# Patient Record
Sex: FEMALE | Race: WHITE | NOT HISPANIC OR LATINO | Employment: FULL TIME | URBAN - METROPOLITAN AREA
[De-identification: names, ages, dates, MRNs, and addresses within clinical notes are randomized per-mention and may not be internally consistent; named-entity substitution may affect disease eponyms.]

---

## 2021-07-19 ENCOUNTER — OFFICE VISIT (OUTPATIENT)
Dept: FAMILY MEDICINE CLINIC | Facility: CLINIC | Age: 31
End: 2021-07-19
Payer: COMMERCIAL

## 2021-07-19 VITALS
BODY MASS INDEX: 29.59 KG/M2 | OXYGEN SATURATION: 99 % | HEIGHT: 63 IN | DIASTOLIC BLOOD PRESSURE: 96 MMHG | SYSTOLIC BLOOD PRESSURE: 140 MMHG | TEMPERATURE: 98.3 F | RESPIRATION RATE: 16 BRPM | HEART RATE: 91 BPM | WEIGHT: 167 LBS

## 2021-07-19 DIAGNOSIS — M25.50 POLYARTHRALGIA: ICD-10-CM

## 2021-07-19 DIAGNOSIS — R03.0 ELEVATED BP WITHOUT DIAGNOSIS OF HYPERTENSION: ICD-10-CM

## 2021-07-19 DIAGNOSIS — F32.0 CURRENT MILD EPISODE OF MAJOR DEPRESSIVE DISORDER WITHOUT PRIOR EPISODE (HCC): ICD-10-CM

## 2021-07-19 DIAGNOSIS — R53.82 CHRONIC FATIGUE: ICD-10-CM

## 2021-07-19 DIAGNOSIS — Z00.00 ANNUAL PHYSICAL EXAM: Primary | ICD-10-CM

## 2021-07-19 DIAGNOSIS — Z13.1 SCREENING FOR DIABETES MELLITUS: ICD-10-CM

## 2021-07-19 DIAGNOSIS — Z13.6 SCREENING FOR CARDIOVASCULAR CONDITION: ICD-10-CM

## 2021-07-19 DIAGNOSIS — Z12.4 SCREENING FOR CERVICAL CANCER: ICD-10-CM

## 2021-07-19 PROCEDURE — 3008F BODY MASS INDEX DOCD: CPT | Performed by: FAMILY MEDICINE

## 2021-07-19 PROCEDURE — 3725F SCREEN DEPRESSION PERFORMED: CPT | Performed by: FAMILY MEDICINE

## 2021-07-19 PROCEDURE — 1036F TOBACCO NON-USER: CPT | Performed by: FAMILY MEDICINE

## 2021-07-19 PROCEDURE — 99385 PREV VISIT NEW AGE 18-39: CPT | Performed by: FAMILY MEDICINE

## 2021-07-19 RX ORDER — NORGESTIMATE AND ETHINYL ESTRADIOL 7DAYSX3 LO
1 KIT ORAL DAILY
COMMUNITY
End: 2022-01-18

## 2021-07-19 NOTE — PROGRESS NOTES
[facility] PRACT[facility]    NAME: Tani Matos  AGE: 27 y o  SEX: female  : 1990     DATE: 2021     Assessment and Plan:     Problem List Items Addressed This Visit        Other    Chronic fatigue    Relevant Orders    Lipid panel    Comprehensive metabolic panel    CBC and differential    TSH, 3rd generation with Free T4 reflex    UA (URINE) with reflex to Scope    ANJEL Screen w/ Reflex to Titer/Pattern    Rheumatoid Factor    BMI 29 0-29 9,adult     BMI Counseling: Body mass index is 29 58 kg/m²  The BMI is above normal  Nutrition recommendations include reducing portion sizes, decreasing overall calorie intake and 3-5 servings of fruits/vegetables daily  Exercise recommendations include exercising 3-5 times per week  Relevant Orders    Lipid panel    Comprehensive metabolic panel    CBC and differential    TSH, 3rd generation with Free T4 reflex    UA (URINE) with reflex to Scope    ANJEL Screen w/ Reflex to Titer/Pattern    Rheumatoid Factor    Current mild episode of major depressive disorder without prior episode ([address] )     Depression Screening Follow-up Plan: Patient's depression screening was positive with a PHQ-2 score of 4  Their PHQ-9 score was 9  Patient assessed for underlying major depression  They have no active suicidal ideations  Brief counseling provided and recommend additional follow-up/re-evaluation next office visit           Relevant Orders    Lipid panel    Comprehensive metabolic panel    CBC and differential    TSH, 3rd generation with Free T4 reflex    UA (URINE) with reflex to Scope    ANJEL Screen w/ Reflex to Titer/Pattern    Rheumatoid Factor    Elevated BP without diagnosis of hypertension    Relevant Orders    Lipid panel    Comprehensive metabolic panel    CBC and differential    TSH, 3rd generation with Free T4 reflex    UA (URINE) with reflex to Scope    ANJEL Screen w/ Reflex to Titer/Pattern Rheumatoid Factor    Polyarthralgia    Relevant Orders    Lipid panel    Comprehensive metabolic panel    CBC and differential    TSH, 3rd generation with Free T4 reflex    UA (URINE) with reflex to Scope    ANJEL Screen w/ Reflex to Titer/Pattern    Rheumatoid Factor      Other Visit Diagnoses     Annual physical exam    -  Primary    Relevant Orders    Lipid panel    Comprehensive metabolic panel    CBC and differential    TSH, 3rd generation with Free T4 reflex    UA (URINE) with reflex to Scope    ANJEL Screen w/ Reflex to Titer/Pattern    Rheumatoid Factor    Screening for cervical cancer        Relevant Orders    Ambulatory referral to Obstetrics / Gynecology    Screening for diabetes mellitus        Relevant Orders    Lipid panel    Comprehensive metabolic panel    Screening for cardiovascular condition        Relevant Orders    Lipid panel    Comprehensive metabolic panel          Immunizations and preventive care screenings were discussed with patient today  Appropriate education was printed on patient's after visit summary  Counseling:  Alcohol/drug use: discussed moderation in alcohol intake, the recommendations for healthy alcohol use, and avoidance of illicit drug use  Dental Health: discussed importance of regular tooth brushing, flossing, and dental visits  Injury prevention: discussed safety/seat belts, safety helmets, smoke detectors, carbon dioxide detectors, and smoking near bedding or upholstery  Sexual health: discussed sexually transmitted diseases, partner selection, use of condoms, avoidance of unintended pregnancy, and contraceptive alternatives  · Exercise: the importance of regular exercise/physical activity was discussed  Recommend exercise 3-5 times per week for at least 30 minutes  Patient reports she received a covid vaccine earlier this year and is UTD on Tdap  Return in about 3 weeks (around 8/9/2021) for depression follow up/lab review       Chief Complaint:     Chief Complaint   Patient presents with   [facility] patient Annual Physical 27year old      History of Present Illness:     Adult Annual Physical   Patient here for a comprehensive physical exam  She is a new patient  Works as an LPN  Denies any PMH  PSH: tonsils, wisdom teeth     Diet and Physical Activity  · Diet/Nutrition: well balanced diet  · Exercise: moderate cardiovascular exercise  Depression Screening  PHQ-9 Depression Screening    PHQ-9:   Frequency of the following problems over the past two weeks:      Little interest or pleasure in doing things: 2 - more than half the days  Feeling down, depressed, or hopeless: 2 - more than half the days  Trouble falling or staying asleep, or sleeping too much: 2 - more than half the days  Feeling tired or having little energy: 2 - more than half the days  Poor appetite or overeatin - not at all  Feeling bad about yourself - or that you are a failure or have let yourself or your family down: 1 - several days  Trouble concentrating on things, such as reading the newspaper or watching television: 0 - not at all  Moving or speaking so slowly that other people could have noticed  Or the opposite - being so fidgety or restless that you have been moving around a lot more than usual: 0 - not at all  Thoughts that you would be better off dead, or of hurting yourself in some way: 0 - not at all  PHQ-2 Score: 4  PHQ-9 Score: 9         /GYN Health  ·   · Contraceptive method: oral contraceptives  ·      Review of Systems:     Review of Systems   Constitutional: Positive for fatigue  Negative for chills and fever  HENT: Negative for congestion, postnasal drip, rhinorrhea and sinus pressure  Eyes: Negative for photophobia and visual disturbance  Respiratory: Negative for cough and shortness of breath  Cardiovascular: Negative for chest pain, palpitations and leg swelling     Gastrointestinal: Negative for abdominal pain, constipation, diarrhea, nausea and vomiting  Genitourinary: Negative for difficulty urinating and dysuria  Musculoskeletal: Negative for arthralgias and myalgias  Skin: Negative for color change and rash  Neurological: Negative for dizziness, weakness, light-headedness and headaches  Past Medical History:     History reviewed  No pertinent past medical history  Past Surgical History:     History reviewed  No pertinent surgical history  Social History:     Social History     Socioeconomic History    Marital status: /Civil Union     Spouse name: None    Number of children: None    Years of education: None    Highest education level: None   Occupational History    None   Tobacco Use    Smoking status: Former Smoker     Packs/day: 0 50     Years: 10 00     Pack years: 5 00     Types: Cigarettes     Quit date: 2019     Years since quittin 2    Smokeless tobacco: Never Used   Vaping Use    Vaping Use: Never used   Substance and Sexual Activity    Alcohol use: Yes     Alcohol/week: 2 0 standard drinks     Types: 2 Standard drinks or equivalent per week    Drug use: None    Sexual activity: None   Other Topics Concern    None   Social History Narrative    None     Social Determinants of Health     Financial Resource Strain:     Difficulty of Paying Living Expenses:    Food Insecurity:     Worried About Running Out of Food in the Last Year:     Ran Out of Food in the Last Year:    Transportation Needs:     Lack of Transportation (Medical):      Lack of Transportation (Non-Medical):    Physical Activity: Inactive    Days of Exercise per Week: 0 days    Minutes of Exercise per Session: 0 min   Stress: Stress Concern Present    Feeling of Stress : Very much   Social Connections:     Frequency of Communication with Friends and Family:     Frequency of Social Gatherings with Friends and Family:     Attends [employer] Services:     Active Member of Clubs or Organizations:     Attends Club or Organization Meetings:     Marital Status:    Intimate Partner Violence: Not At Risk    Fear of Current or Ex-Partner: No    Emotionally Abused: No    Physically Abused: No    Sexually Abused: No      Family History:     History reviewed  No pertinent family history  Current Medications:     Current Outpatient Medications   Medication Sig Dispense Refill    norgestimate-ethinyl estradiol (ORTHO TRI-CYCLEN LO) 0 18/0 215/0 25 MG-25 MCG per tablet Take 1 tablet by mouth daily       No current facility-administered medications for this visit  Allergies:     No Known Allergies   Physical Exam:     /96 (BP Location: Right arm, Patient Position: Sitting, Cuff Size: Standard)   Pulse 91   Temp 98 3 °F (36 8 °C) (Tympanic)   Resp 16   Ht 5' 3" (1 6 m)   Wt 75 8 kg (167 lb)   LMP 07/05/2021   SpO2 99%   BMI 29 58 kg/m²     Physical Exam  Constitutional:       General: She is not in acute distress  Appearance: Normal appearance  She is not ill-appearing, toxic-appearing or diaphoretic  HENT:      Head: Normocephalic and atraumatic  Right Ear: Tympanic membrane and ear canal normal       Left Ear: Tympanic membrane and ear canal normal       Nose: Nose normal  No congestion  Mouth/Throat:      Mouth: Mucous membranes are moist       Pharynx: Oropharynx is clear  No oropharyngeal exudate  Eyes:      Extraocular Movements: Extraocular movements intact  Conjunctiva/sclera: Conjunctivae normal       Pupils: Pupils are equal, round, and reactive to light  Cardiovascular:      Rate and Rhythm: Normal rate and regular rhythm  Pulses: Normal pulses  Heart sounds: No murmur heard  Pulmonary:      Effort: Pulmonary effort is normal       Breath sounds: Normal breath sounds  No wheezing, rhonchi or rales  Abdominal:      General: Bowel sounds are normal  There is no distension  Palpations: Abdomen is soft  Tenderness: There is no abdominal tenderness  Musculoskeletal:         General: No swelling or tenderness  Normal range of motion  Cervical back: Normal range of motion and neck supple  Skin:     General: Skin is warm and dry  Capillary Refill: Capillary refill takes less than 2 seconds  Neurological:      General: No focal deficit present  Mental Status: She is alert and oriented to person, place, and time  Cranial Nerves: No cranial nerve deficit  Psychiatric:         Mood and Affect: Mood normal          Behavior: Behavior normal          Thought Content:  Thought content normal           Eleuterio Long DO   [address]

## 2021-07-19 NOTE — ASSESSMENT & PLAN NOTE
Depression Screening Follow-up Plan: Patient's depression screening was positive with a PHQ-2 score of 4  Their PHQ-9 score was 9  Patient assessed for underlying major depression  They have no active suicidal ideations  Brief counseling provided and recommend additional follow-up/re-evaluation next office visit

## 2021-07-19 NOTE — ASSESSMENT & PLAN NOTE
BMI Counseling: Body mass index is 29 58 kg/m²  The BMI is above normal  Nutrition recommendations include reducing portion sizes, decreasing overall calorie intake and 3-5 servings of fruits/vegetables daily  Exercise recommendations include exercising 3-5 times per week

## 2021-07-19 NOTE — PATIENT INSTRUCTIONS

## 2021-07-22 LAB
ALBUMIN SERPL-MCNC: 4.1 G/DL (ref 3.6–5.1)
ALBUMIN/GLOB SERPL: 1.4 (CALC) (ref 1–2.5)
ALP SERPL-CCNC: 67 U/L (ref 31–125)
ALT SERPL-CCNC: 12 U/L (ref 6–29)
ANA PAT SER IF-IMP: ABNORMAL
ANA SER QL IF: POSITIVE
ANA TITR SER IF: ABNORMAL TITER
APPEARANCE UR: ABNORMAL
AST SERPL-CCNC: 13 U/L (ref 10–30)
BACTERIA UR QL AUTO: ABNORMAL /HPF
BASOPHILS # BLD AUTO: 28 CELLS/UL (ref 0–200)
BASOPHILS NFR BLD AUTO: 0.3 %
BILIRUB SERPL-MCNC: 0.3 MG/DL (ref 0.2–1.2)
BILIRUB UR QL STRIP: NEGATIVE
BUN SERPL-MCNC: 8 MG/DL (ref 7–25)
BUN/CREAT SERPL: ABNORMAL (CALC) (ref 6–22)
CALCIUM SERPL-MCNC: 9.3 MG/DL (ref 8.6–10.2)
CHLORIDE SERPL-SCNC: 105 MMOL/L (ref 98–110)
CHOLEST SERPL-MCNC: 140 MG/DL
CHOLEST/HDLC SERPL: 2.2 (CALC)
CO2 SERPL-SCNC: 28 MMOL/L (ref 20–32)
COLOR UR: YELLOW
CREAT SERPL-MCNC: 0.67 MG/DL (ref 0.5–1.1)
EOSINOPHIL # BLD AUTO: 205 CELLS/UL (ref 15–500)
EOSINOPHIL NFR BLD AUTO: 2.2 %
ERYTHROCYTE [DISTWIDTH] IN BLOOD BY AUTOMATED COUNT: 11.9 % (ref 11–15)
GLOBULIN SER CALC-MCNC: 2.9 G/DL (CALC) (ref 1.9–3.7)
GLUCOSE SERPL-MCNC: 130 MG/DL (ref 65–99)
GLUCOSE UR QL STRIP: NEGATIVE
HCT VFR BLD AUTO: 40.3 % (ref 35–45)
HDLC SERPL-MCNC: 63 MG/DL
HGB BLD-MCNC: 13.5 G/DL (ref 11.7–15.5)
HGB UR QL STRIP: NEGATIVE
KETONES UR QL STRIP: NEGATIVE
LDLC SERPL CALC-MCNC: 62 MG/DL (CALC)
LEUKOCYTE ESTERASE UR QL STRIP: ABNORMAL
LYMPHOCYTES # BLD AUTO: 3608 CELLS/UL (ref 850–3900)
LYMPHOCYTES NFR BLD AUTO: 38.8 %
MCH RBC QN AUTO: 34.1 PG (ref 27–33)
MCHC RBC AUTO-ENTMCNC: 33.5 G/DL (ref 32–36)
MCV RBC AUTO: 101.8 FL (ref 80–100)
MONOCYTES # BLD AUTO: 772 CELLS/UL (ref 200–950)
MONOCYTES NFR BLD AUTO: 8.3 %
NEUTROPHILS # BLD AUTO: 4687 CELLS/UL (ref 1500–7800)
NEUTROPHILS NFR BLD AUTO: 50.4 %
NITRITE UR QL STRIP: NEGATIVE
NONHDLC SERPL-MCNC: 77 MG/DL (CALC)
PH UR STRIP: 5.5 [PH] (ref 5–8)
PLATELET # BLD AUTO: 250 THOUSAND/UL (ref 140–400)
PMV BLD REES-ECKER: 11.7 FL (ref 7.5–12.5)
POTASSIUM SERPL-SCNC: 4.2 MMOL/L (ref 3.5–5.3)
PROT SERPL-MCNC: 7 G/DL (ref 6.1–8.1)
PROT UR QL STRIP: NEGATIVE
RBC # BLD AUTO: 3.96 MILLION/UL (ref 3.8–5.1)
RBC #/AREA URNS HPF: ABNORMAL /HPF
RHEUMATOID FACT SERPL-ACNC: <14 IU/ML
SL AMB EGFR AFRICAN AMERICAN: 135 ML/MIN/1.73M2
SL AMB EGFR NON AFRICAN AMERICAN: 116 ML/MIN/1.73M2
SODIUM SERPL-SCNC: 138 MMOL/L (ref 135–146)
SP GR UR STRIP: 1.02 (ref 1–1.03)
SQUAMOUS #/AREA URNS HPF: ABNORMAL /HPF
TRIGL SERPL-MCNC: 69 MG/DL
TSH SERPL-ACNC: 2.71 MIU/L
WBC # BLD AUTO: 9.3 THOUSAND/UL (ref 3.8–10.8)
WBC #/AREA URNS HPF: ABNORMAL /HPF

## 2021-08-09 ENCOUNTER — RA CDI HCC (OUTPATIENT)
Dept: OTHER | Facility: HOSPITAL | Age: 31
End: 2021-08-09

## 2021-08-09 NOTE — PROGRESS NOTES
[facility]  coding opportunities          Chart reviewed, no opportunity found: CHART REVIEWED, NO OPPORTUNITY FOUND                     Patients insurance company: [facility] [address]  (Medicare Advantage and Commercial)

## 2021-08-13 ENCOUNTER — OFFICE VISIT (OUTPATIENT)
Dept: FAMILY MEDICINE CLINIC | Facility: CLINIC | Age: 31
End: 2021-08-13
Payer: COMMERCIAL

## 2021-08-13 VITALS
TEMPERATURE: 97.6 F | BODY MASS INDEX: 29.23 KG/M2 | WEIGHT: 165 LBS | SYSTOLIC BLOOD PRESSURE: 120 MMHG | DIASTOLIC BLOOD PRESSURE: 82 MMHG | OXYGEN SATURATION: 99 % | RESPIRATION RATE: 20 BRPM | HEIGHT: 63 IN | HEART RATE: 88 BPM

## 2021-08-13 DIAGNOSIS — R76.8 POSITIVE ANA (ANTINUCLEAR ANTIBODY): ICD-10-CM

## 2021-08-13 DIAGNOSIS — M25.50 POLYARTHRALGIA: ICD-10-CM

## 2021-08-13 DIAGNOSIS — R73.01 IFG (IMPAIRED FASTING GLUCOSE): Primary | ICD-10-CM

## 2021-08-13 DIAGNOSIS — R53.82 CHRONIC FATIGUE: ICD-10-CM

## 2021-08-13 DIAGNOSIS — F32.0 CURRENT MILD EPISODE OF MAJOR DEPRESSIVE DISORDER WITHOUT PRIOR EPISODE (HCC): ICD-10-CM

## 2021-08-13 LAB — SL AMB POCT HEMOGLOBIN AIC: 6.3 (ref ?–6.5)

## 2021-08-13 PROCEDURE — 3725F SCREEN DEPRESSION PERFORMED: CPT | Performed by: FAMILY MEDICINE

## 2021-08-13 PROCEDURE — 99214 OFFICE O/P EST MOD 30 MIN: CPT | Performed by: FAMILY MEDICINE

## 2021-08-13 PROCEDURE — 3008F BODY MASS INDEX DOCD: CPT | Performed by: FAMILY MEDICINE

## 2021-08-13 PROCEDURE — 1036F TOBACCO NON-USER: CPT | Performed by: FAMILY MEDICINE

## 2021-08-13 PROCEDURE — 83036 HEMOGLOBIN GLYCOSYLATED A1C: CPT | Performed by: FAMILY MEDICINE

## 2021-08-13 RX ORDER — SERTRALINE HYDROCHLORIDE 25 MG/1
25 TABLET, FILM COATED ORAL DAILY
Qty: 30 TABLET | Refills: 5 | Status: SHIPPED | OUTPATIENT
Start: 2021-08-13 | End: 2021-09-09 | Stop reason: SDUPTHER

## 2021-08-13 NOTE — ASSESSMENT & PLAN NOTE
Lab Results   Component Value Date    HGBA1C 6 3 08/13/2021       Will monitor with lifestyle modifications and recheck HA1C in 3 months time

## 2021-08-13 NOTE — PROGRESS NOTES
Carlos Sneed 1990 female MRN: [id number]    Family Medicine Follow-up Visit    ASSESSMENT/PLAN  Problem List Items Addressed This Visit        Endocrine    IFG (impaired fasting glucose) - Primary     Lab Results   Component Value Date    HGBA1C 6 3 08/13/2021       Will monitor with lifestyle modifications and recheck HA1C in 3 months time          Relevant Orders    POCT hemoglobin A1c (Completed)       Other    Chronic fatigue    Relevant Orders    Ambulatory referral to Rheumatology    Current mild episode of major depressive disorder without prior episode ([address] )     We will start medication at this time  Close follow up in 3 weeks for medication check  Will also schedule apt with Lorna Schultz for [facility] support          Relevant Medications    sertraline (ZOLOFT) 25 mg tablet    Polyarthralgia    Relevant Orders    Ambulatory referral to Rheumatology    Positive ANJEL (antinuclear antibody)    Relevant Orders    Ambulatory referral to Rheumatology                   Future Appointments   Date Time Provider Lorenzo Cunha   9/9/2021  9:15 AM DO [facility]          SUBJECTIVE  CC: Follow-up (Review blood work  depression)      HPI:  Carlos Sneed is a 27 y o  female who presents for check up depression/ lab work review  HPI    Review of Systems   Constitutional: Negative for chills, fatigue and fever  HENT: Negative for congestion, postnasal drip, rhinorrhea and sinus pressure  Eyes: Negative for photophobia and visual disturbance  Respiratory: Negative for cough and shortness of breath  Cardiovascular: Negative for chest pain, palpitations and leg swelling  Gastrointestinal: Negative for abdominal pain, constipation, diarrhea, nausea and vomiting  Genitourinary: Negative for difficulty urinating and dysuria  Musculoskeletal: Negative for arthralgias and myalgias  Skin: Negative for color change and rash     Neurological: Negative for dizziness, weakness, light-headedness and headaches  Historical Information   The patient history was reviewed as follows:    History reviewed  No pertinent past medical history  History reviewed  No pertinent surgical history  History reviewed  No pertinent family history  Social History   Social History     Substance and Sexual Activity   Alcohol Use Yes    Alcohol/week: 2 0 standard drinks    Types: 2 Standard drinks or equivalent per week     Social History     Substance and Sexual Activity   Drug Use Not on file     Social History     Tobacco Use   Smoking Status Former Smoker    Packs/day: 0 50    Years: 10 00    Pack years: 5 00    Types: Cigarettes    Quit date: 2019    Years since quittin 2   Smokeless Tobacco Never Used       Medications:     Current Outpatient Medications:     norgestimate-ethinyl estradiol (ORTHO TRI-CYCLEN LO) 0 18/0 215/0 25 MG-25 MCG per tablet, Take 1 tablet by mouth daily (Patient not taking: Reported on 2021), Disp: , Rfl:     sertraline (ZOLOFT) 25 mg tablet, Take 1 tablet (25 mg total) by mouth daily, Disp: 30 tablet, Rfl: 5  No Known Allergies    OBJECTIVE    Vitals:   Vitals:    21 1038   BP: 120/82   BP Location: Left arm   Patient Position: Sitting   Cuff Size: Standard   Pulse: 88   Resp: 20   Temp: 97 6 °F (36 4 °C)   TempSrc: Axillary   SpO2: 99%   Weight: 74 8 kg (165 lb)   Height: 5' 3" (1 6 m)           Physical Exam  Constitutional:       General: She is not in acute distress  Appearance: She is not ill-appearing, toxic-appearing or diaphoretic  HENT:      Head: Normocephalic and atraumatic  Nose: Nose normal    Eyes:      Extraocular Movements: Extraocular movements intact  Conjunctiva/sclera: Conjunctivae normal    Pulmonary:      Effort: Pulmonary effort is normal  No respiratory distress  Musculoskeletal:         General: Normal range of motion  Cervical back: Normal range of motion     Neurological:      General: No focal deficit present  Mental Status: She is alert and oriented to person, place, and time     Psychiatric:         Mood and Affect: Mood normal          Behavior: Behavior normal             Labs:        DO Filemon    8/13/2021

## 2021-08-13 NOTE — ASSESSMENT & PLAN NOTE
We will start medication at this time  Close follow up in 3 weeks for medication check  Will also schedule apt with Juliette Llanes for [address] [place] [address]

## 2021-09-09 ENCOUNTER — TELEMEDICINE (OUTPATIENT)
Dept: FAMILY MEDICINE CLINIC | Facility: CLINIC | Age: 31
End: 2021-09-09
Payer: COMMERCIAL

## 2021-09-09 VITALS — WEIGHT: 162 LBS | HEIGHT: 63 IN | BODY MASS INDEX: 28.7 KG/M2

## 2021-09-09 DIAGNOSIS — F32.0 CURRENT MILD EPISODE OF MAJOR DEPRESSIVE DISORDER WITHOUT PRIOR EPISODE (HCC): Primary | ICD-10-CM

## 2021-09-09 PROCEDURE — 3725F SCREEN DEPRESSION PERFORMED: CPT | Performed by: FAMILY MEDICINE

## 2021-09-09 PROCEDURE — 3008F BODY MASS INDEX DOCD: CPT | Performed by: FAMILY MEDICINE

## 2021-09-09 PROCEDURE — 99214 OFFICE O/P EST MOD 30 MIN: CPT | Performed by: FAMILY MEDICINE

## 2021-09-09 PROCEDURE — 1036F TOBACCO NON-USER: CPT | Performed by: FAMILY MEDICINE

## 2021-09-09 NOTE — PROGRESS NOTES
Virtual Regular Visit    Verification of patient location:    Patient is located in the following state in which I hold an active license [place]      Assessment/Plan:    Problem List Items Addressed This Visit        Other    Current mild episode of major depressive disorder without prior episode ([facility] [address] ) - Primary    Relevant Medications    sertraline (ZOLOFT) 50 mg tablet        Silas Meigs is noting some improvement since starting zoloft  We will increase dose to 50 mg daily and continue to monitor and dose adjust as needed        Reason for visit is   Chief Complaint   Patient presents with    Follow-up     Follow up depression    Virtual Regular Visit        Encounter provider Cinda Tompkins DO    Provider located at [address] [place] [address]    [address] , [address] [id number] [phone]      Recent Visits  No visits were found meeting these conditions  Showing recent visits within past 7 days and meeting all other requirements  Today's Visits  Date Type Provider Dept   09/09/21 Telemedicine Cinda Tompkins DO [facility]   Showing today's visits and meeting all other requirements  Future Appointments  No visits were found meeting these conditions  Showing future appointments within next 150 days and meeting all other requirements       The patient was identified by name and date of birth  Jerri Goldstein was informed that this is a telemedicine visit and that the visit is being conducted through [address] [place] [address] Now and patient was informed that this is a secure, HIPAA-compliant platform  She agrees to proceed     My office door was closed  No one else was in the room  She acknowledged consent and understanding of privacy and security of the video platform  The patient has agreed to participate and understands they can discontinue the visit at any time  Patient is aware this is a billable service       Subjective  Jerri Goldstein is a 27 y o  female being seen for follow up of depression  She has been taking zoloft daily in the morning and reports slight improvement in her symptoms  No negative side effects  HPI     No past medical history on file  No past surgical history on file  Current Outpatient Medications   Medication Sig Dispense Refill    sertraline (ZOLOFT) 50 mg tablet Take 1 tablet (50 mg total) by mouth daily 30 tablet 1    norgestimate-ethinyl estradiol (ORTHO TRI-CYCLEN LO) 0 18/0 215/0 25 MG-25 MCG per tablet Take 1 tablet by mouth daily (Patient not taking: Reported on 8/13/2021)       No current facility-administered medications for this visit  No Known Allergies    Review of Systems   Constitutional: Negative for chills, fatigue and fever  HENT: Negative for congestion, postnasal drip, rhinorrhea and sinus pressure  Eyes: Negative for photophobia and visual disturbance  Respiratory: Negative for cough and shortness of breath  Cardiovascular: Negative for chest pain, palpitations and leg swelling  Gastrointestinal: Negative for abdominal pain, constipation, diarrhea, nausea and vomiting  Genitourinary: Negative for difficulty urinating and dysuria  Musculoskeletal: Negative for arthralgias and myalgias  Skin: Negative for color change and rash  Neurological: Negative for dizziness, weakness, light-headedness and headaches  Video Exam    Vitals:    09/09/21 0902   Weight: 73 5 kg (162 lb)   Height: 5' 3" (1 6 m)       Physical Exam  Constitutional:       General: She is not in acute distress  Appearance: She is not ill-appearing, toxic-appearing or diaphoretic  HENT:      Head: Normocephalic and atraumatic  Nose: Nose normal    Eyes:      Extraocular Movements: Extraocular movements intact  Conjunctiva/sclera: Conjunctivae normal    Pulmonary:      Effort: Pulmonary effort is normal  No respiratory distress  Musculoskeletal:         General: Normal range of motion        Cervical back: Normal range of motion  Neurological:      Mental Status: She is oriented to person, place, and time  Psychiatric:         Mood and Affect: Mood normal          Behavior: Behavior normal           I spent 15 minutes directly with the patient during this visit    VIRTUAL VISIT DISCLAIMER      Angelmegan Jarad verbally agrees to participate in [employer]  Pt is aware that [employer] could be limited without vital signs or the ability to perform a full hands-on physical exam  Naya Gan understands she or the provider may request at any time to terminate the video visit and request the patient to seek care or treatment in person

## 2021-10-22 ENCOUNTER — SOCIAL WORK (OUTPATIENT)
Dept: BEHAVIORAL/MENTAL HEALTH CLINIC | Facility: CLINIC | Age: 31
End: 2021-10-22
Payer: COMMERCIAL

## 2021-10-22 DIAGNOSIS — F33.1 MODERATE RECURRENT MAJOR DEPRESSION (HCC): Primary | ICD-10-CM

## 2021-10-22 DIAGNOSIS — F41.1 GAD (GENERALIZED ANXIETY DISORDER): ICD-10-CM

## 2021-10-22 PROCEDURE — 90834 PSYTX W PT 45 MINUTES: CPT | Performed by: SOCIAL WORKER

## 2021-11-14 DIAGNOSIS — F32.0 CURRENT MILD EPISODE OF MAJOR DEPRESSIVE DISORDER WITHOUT PRIOR EPISODE (HCC): ICD-10-CM

## 2021-12-03 ENCOUNTER — HOSPITAL ENCOUNTER (OUTPATIENT)
Dept: RADIOLOGY | Facility: HOSPITAL | Age: 31
Discharge: HOME | End: 2021-12-03
Payer: COMMERCIAL

## 2021-12-03 ENCOUNTER — CONSULT (OUTPATIENT)
Dept: RHEUMATOLOGY | Facility: CLINIC | Age: 31
End: 2021-12-03
Payer: COMMERCIAL

## 2021-12-03 VITALS
DIASTOLIC BLOOD PRESSURE: 82 MMHG | HEIGHT: 63 IN | BODY MASS INDEX: 28.7 KG/M2 | WEIGHT: 162 LBS | SYSTOLIC BLOOD PRESSURE: 134 MMHG

## 2021-12-03 DIAGNOSIS — R53.82 CHRONIC FATIGUE: ICD-10-CM

## 2021-12-03 DIAGNOSIS — M54.50 CHRONIC BILATERAL LOW BACK PAIN WITHOUT SCIATICA: ICD-10-CM

## 2021-12-03 DIAGNOSIS — R76.8 POSITIVE ANA (ANTINUCLEAR ANTIBODY): ICD-10-CM

## 2021-12-03 DIAGNOSIS — R76.8 ANA POSITIVE: Primary | ICD-10-CM

## 2021-12-03 DIAGNOSIS — M25.50 POLYARTHRALGIA: ICD-10-CM

## 2021-12-03 DIAGNOSIS — G89.29 CHRONIC BILATERAL LOW BACK PAIN WITHOUT SCIATICA: ICD-10-CM

## 2021-12-03 PROCEDURE — 72200 X-RAY EXAM SI JOINTS: CPT

## 2021-12-03 PROCEDURE — 72110 X-RAY EXAM L-2 SPINE 4/>VWS: CPT

## 2021-12-03 PROCEDURE — 99244 OFF/OP CNSLTJ NEW/EST MOD 40: CPT | Performed by: INTERNAL MEDICINE

## 2021-12-13 LAB
25(OH)D3+25(OH)D2 SERPL-MCNC: 34.8 NG/ML (ref 30–100)
ANA SER QL: NEGATIVE
BASOPHILS # BLD AUTO: 0 X10E3/UL (ref 0–0.2)
BASOPHILS NFR BLD AUTO: 0 %
C3 SERPL-MCNC: 135 MG/DL (ref 82–167)
C4 SERPL-MCNC: 29 MG/DL (ref 12–38)
CCP IGA+IGG SERPL IA-ACNC: 5 UNITS (ref 0–19)
CENTROMERE AB TITR SER IF: NORMAL {TITER}
CK SERPL-CCNC: 55 U/L (ref 32–182)
CRP SERPL-MCNC: <1 MG/L (ref 0–10)
DSDNA AB SER-ACNC: <1 IU/ML (ref 0–9)
ENA JO1 AB SER-ACNC: <0.2 AI (ref 0–0.9)
ENA RNP AB SER-ACNC: 0.2 AI (ref 0–0.9)
ENA SCL70 AB SER-ACNC: <0.2 AI (ref 0–0.9)
ENA SM AB SER-ACNC: <0.2 AI (ref 0–0.9)
ENA SM AB SER-ACNC: <20 UNITS
ENA SS-A AB SER-ACNC: <0.2 AI (ref 0–0.9)
ENA SS-B AB SER-ACNC: <0.2 AI (ref 0–0.9)
EOSINOPHIL # BLD AUTO: 0.2 X10E3/UL (ref 0–0.4)
EOSINOPHIL NFR BLD AUTO: 2 %
ERYTHROCYTE [DISTWIDTH] IN BLOOD BY AUTOMATED COUNT: 12 % (ref 11.7–15.4)
ERYTHROCYTE [SEDIMENTATION RATE] IN BLOOD BY WESTERGREN METHOD: 5 MM/HR (ref 0–32)
HBV CORE AB SERPL QL IA: NEGATIVE
HBV SURFACE AB SER QL: REACTIVE
HBV SURFACE AG SERPL QL IA: NEGATIVE
HCT VFR BLD AUTO: 39.2 % (ref 34–46.6)
HCV AB S/CO SERPL IA: <0.1 S/CO RATIO (ref 0–0.9)
HGB BLD-MCNC: 13.5 G/DL (ref 11.1–15.9)
HLA-B27 QL NAA+PROBE: NEGATIVE
IMM GRANULOCYTES # BLD: 0 X10E3/UL (ref 0–0.1)
IMM GRANULOCYTES NFR BLD: 0 %
LYMPHOCYTES # BLD AUTO: 3 X10E3/UL (ref 0.7–3.1)
LYMPHOCYTES NFR BLD AUTO: 36 %
MCH RBC QN AUTO: 34.8 PG (ref 26.6–33)
MCHC RBC AUTO-ENTMCNC: 34.4 G/DL (ref 31.5–35.7)
MCV RBC AUTO: 101 FL (ref 79–97)
MONOCYTES # BLD AUTO: 0.6 X10E3/UL (ref 0.1–0.9)
MONOCYTES NFR BLD AUTO: 8 %
NEUTROPHILS # BLD AUTO: 4.6 X10E3/UL (ref 1.4–7)
NEUTROPHILS NFR BLD AUTO: 54 %
PLATELET # BLD AUTO: 231 X10E3/UL (ref 150–450)
RBC # BLD AUTO: 3.88 X10E6/UL (ref 3.77–5.28)
SL AMB INTERPRETATION: NORMAL
THYROGLOB AB SERPL-ACNC: <1 IU/ML (ref 0–0.9)
THYROPEROXIDASE AB SERPL-ACNC: <9 IU/ML
WBC # BLD AUTO: 8.5 X10E3/UL (ref 3.4–10.8)

## 2021-12-28 ENCOUNTER — OFFICE VISIT (OUTPATIENT)
Dept: FAMILY MEDICINE CLINIC | Facility: CLINIC | Age: 31
End: 2021-12-28
Payer: COMMERCIAL

## 2021-12-28 VITALS
BODY MASS INDEX: 31.29 KG/M2 | RESPIRATION RATE: 20 BRPM | SYSTOLIC BLOOD PRESSURE: 130 MMHG | OXYGEN SATURATION: 97 % | HEIGHT: 63 IN | HEART RATE: 91 BPM | WEIGHT: 176.6 LBS | DIASTOLIC BLOOD PRESSURE: 84 MMHG | TEMPERATURE: 97.2 F

## 2021-12-28 DIAGNOSIS — R21 RASH AND NONSPECIFIC SKIN ERUPTION: Primary | ICD-10-CM

## 2021-12-28 PROCEDURE — 99214 OFFICE O/P EST MOD 30 MIN: CPT | Performed by: FAMILY MEDICINE

## 2021-12-28 PROCEDURE — 3725F SCREEN DEPRESSION PERFORMED: CPT | Performed by: FAMILY MEDICINE

## 2021-12-28 PROCEDURE — 3008F BODY MASS INDEX DOCD: CPT | Performed by: FAMILY MEDICINE

## 2021-12-28 PROCEDURE — 1036F TOBACCO NON-USER: CPT | Performed by: FAMILY MEDICINE

## 2021-12-28 RX ORDER — PREDNISONE 10 MG/1
TABLET ORAL
Qty: 21 TABLET | Refills: 0 | Status: SHIPPED | OUTPATIENT
Start: 2021-12-28 | End: 2022-01-18

## 2022-01-11 DIAGNOSIS — F32.0 CURRENT MILD EPISODE OF MAJOR DEPRESSIVE DISORDER WITHOUT PRIOR EPISODE (HCC): ICD-10-CM

## 2022-01-18 ENCOUNTER — OFFICE VISIT (OUTPATIENT)
Dept: OBGYN CLINIC | Facility: CLINIC | Age: 32
End: 2022-01-18
Payer: COMMERCIAL

## 2022-01-18 VITALS
HEIGHT: 63 IN | SYSTOLIC BLOOD PRESSURE: 114 MMHG | BODY MASS INDEX: 31.54 KG/M2 | WEIGHT: 178 LBS | DIASTOLIC BLOOD PRESSURE: 64 MMHG

## 2022-01-18 DIAGNOSIS — Z01.419 ENCOUNTER FOR GYNECOLOGICAL EXAMINATION WITHOUT ABNORMAL FINDING: Primary | ICD-10-CM

## 2022-01-18 DIAGNOSIS — Z31.69 ENCOUNTER FOR PRECONCEPTION CONSULTATION: ICD-10-CM

## 2022-01-18 DIAGNOSIS — Z30.09 CONTRACEPTIVE EDUCATION: ICD-10-CM

## 2022-01-18 DIAGNOSIS — Z12.4 CERVICAL CANCER SCREENING: ICD-10-CM

## 2022-01-18 PROCEDURE — 99385 PREV VISIT NEW AGE 18-39: CPT | Performed by: OBSTETRICS & GYNECOLOGY

## 2022-01-18 PROCEDURE — 3008F BODY MASS INDEX DOCD: CPT | Performed by: OBSTETRICS & GYNECOLOGY

## 2022-01-18 PROCEDURE — 1036F TOBACCO NON-USER: CPT | Performed by: OBSTETRICS & GYNECOLOGY

## 2022-01-18 NOTE — PROGRESS NOTES
[address], [address] [address]    ASSESSMENT/PLAN: Nelia Lombard is a 27 y o  No obstetric history on file  who presents for annual gynecologic exam     Encounter for routine gynecologic examination  - Routine well woman exam completed today  - HPV Vaccination status: not  Sure  ,  COVID  Vaccine  Pfizer   X 2   Flu shot  +  Counseled on HPV vaccine till age  38    - genetic disorders in either  Family pt  Declines  + zoloft never hospitalized for depression  No counseling  Denies  Feelings of hurting self or others   - Contraceptive counseling discussed  Current contraception: none: ;5/2020 stopped  OCP prior use of  Depo provera      Additional problems addressed during this visit:  1  Encounter for gynecological examination without abnormal finding    2  Contraceptive education  Comments: On contraception for  birth control prior regular cycles     3  BMI 31 0-31 9,adult  Comments:  Reviewed  Healthy bmi  and  exercise     4  Encounter for preconception consultation  Comments:  Denies  genetic disorders ,  MVI , dw pt diet and exercise  To get rubella screen  Orders:  -     Rubella antibody, IgG; Future  -     Rubella antibody, IgG    5  Cervical cancer screening  -     Thinprep Tis Pap and HPV mRNA E6/E7 Reflex HPV 16,18/45    28 yo [employer] here for wellness exam   Cycles  27-32 d for  4-5 d  No family planning  Desires fertility but not actively trying   + has an marjorie for  Fertility  Dw pt   [place] every other day during that time  Denies Genetic disorders  Will get Rubella screen   If not pregnant  With in   12 mo of actively trying and regular menses to contact the office  Former smoker     CC:  Annual Gynecologic Examination    HPI: Nelia Lombard is a 27 y o  No obstetric history on file  who presents for annual gynecologic examination    HPI    The following portions of the patient's history were reviewed and updated as appropriate: She has a past medical history of Chronic fatigue  She  has a past surgical history that includes TONSILECTOMY AND ADNOIDECTOMY and [place] tooth extraction (2019)  Her family history includes Cancer in her maternal grandfather  She  reports that she quit smoking about 2 years ago  Her smoking use included cigarettes  She has a 5 00 pack-year smoking history  She has never used smokeless tobacco  She reports current alcohol use of about 2 0 standard drinks of alcohol per week  She reports that she does not use drugs  Current Outpatient Medications   Medication Sig Dispense Refill    Prenatal MV-Min-Fe Fum-FA-DHA (PRENATAL 1 PO) Take 1 tablet by mouth daily      sertraline (ZOLOFT) 50 mg tablet Take 1 tablet (50 mg total) by mouth daily 30 tablet 0    norgestimate-ethinyl estradiol (ORTHO TRI-CYCLEN LO) 0 18/0 215/0 25 MG-25 MCG per tablet Take 1 tablet by mouth daily   (Patient not taking: Reported on 1/18/2022 )      predniSONE 10 mg tablet Take 60 mg x 1 day, then 50 mg x1 day, then 40 mg x1 day, then 30 mg x1 day, then 20 mg x 1 day, then  10 mg x1 day (Patient not taking: Reported on 1/18/2022 ) 21 tablet 0     No current facility-administered medications for this visit  She has No Known Allergies       Review of Systems   Constitutional: Negative for chills and fever  HENT: Negative for ear pain and sore throat  Eyes: Negative for pain and visual disturbance  Respiratory: Negative for cough and shortness of breath  Cardiovascular: Negative for chest pain and palpitations  Gastrointestinal: Negative for abdominal pain and vomiting  Genitourinary: Negative for dysuria and hematuria  Musculoskeletal: Negative for arthralgias and back pain  Skin: Negative for color change and rash  Neurological: Negative for seizures and syncope  Psychiatric/Behavioral: Negative  All other systems reviewed and are negative          Objective:  /64 (BP Location: Left arm, Patient Position: Sitting, Cuff Size: Standard)   Ht 5' 3" (1 6 m)   Wt 80 7 kg (178 lb)   BMI 31 53 kg/m²    Physical Exam  Vitals and nursing note reviewed  Constitutional:       Appearance: Normal appearance  HENT:      Head: Normocephalic  Cardiovascular:      Rate and Rhythm: Normal rate and regular rhythm  Pulses: Normal pulses  Heart sounds: Normal heart sounds  Pulmonary:      Effort: Pulmonary effort is normal       Breath sounds: Normal breath sounds  Chest:      Chest wall: No mass, lacerations, swelling, tenderness or edema  Breasts: Nicholas Score is 4  Breasts are symmetrical       Right: Normal  No swelling, bleeding, inverted nipple, mass, nipple discharge, skin change, tenderness, axillary adenopathy or supraclavicular adenopathy  Left: No swelling, bleeding, inverted nipple, mass, nipple discharge, skin change, tenderness, axillary adenopathy or supraclavicular adenopathy  Abdominal:      General: Abdomen is flat  Bowel sounds are normal       Palpations: Abdomen is soft  Genitourinary:     General: Normal vulva  Exam position: Lithotomy position  Pubic Area: No rash  Nicholas stage (genital): 4       Labia:         Right: No rash, tenderness or lesion  Left: No rash, tenderness or lesion  Urethra: No urethral pain, urethral swelling or urethral lesion  Vagina: Normal       Cervix: No cervical motion tenderness or discharge  Uterus: Normal        Adnexa: Right adnexa normal and left adnexa normal       Rectum: Normal    Musculoskeletal:         General: Normal range of motion  Cervical back: Neck supple  Lymphadenopathy:      Upper Body:      Right upper body: No supraclavicular, axillary or pectoral adenopathy  Left upper body: No supraclavicular, axillary or pectoral adenopathy  Lower Body: No right inguinal adenopathy  No left inguinal adenopathy  Skin:     General: Skin is warm and dry     Neurological:      General: No focal deficit present  Mental Status: She is alert and oriented to person, place, and time     Psychiatric:         Mood and Affect: Mood normal          Behavior: Behavior normal

## 2022-01-18 NOTE — PATIENT INSTRUCTIONS
Pap every 5 years if normal, sexually transmitted infection testing as indicated, exercise most days of week, obtain appropriate diet and hydration, Calcium 1000mg + 600 vit D daily, birth control as directed (ACHES reviewed)  Benefits, risks and alternatives discussed/reviewed  HPV 9 vaccine recommended through age 39  Check with your insurance for coverage  If covered, call office to schedule start of vaccine series  Annual mammogram starting at age 36, monthly breast self exam  Romana Punter 20 times twice daily

## 2022-01-20 LAB
CLINICAL INFO: ABNORMAL
CYTO CVX: ABNORMAL
CYTOLOGY CMNT CVX/VAG CYTO-IMP: ABNORMAL
DATE PREVIOUS BX: ABNORMAL
HPV E6+E7 MRNA CVX QL NAA+PROBE: NOT DETECTED
LMP START DATE: ABNORMAL
MICROORGANISM CVX/VAG CYTO: ABNORMAL
SL AMB PREV. PAP:: ABNORMAL
SPECIMEN SOURCE CVX/VAG CYTO: ABNORMAL

## 2022-03-16 ENCOUNTER — INITIAL PRENATAL (OUTPATIENT)
Dept: OBGYN CLINIC | Facility: CLINIC | Age: 32
End: 2022-03-16

## 2022-03-16 VITALS — WEIGHT: 190 LBS | BODY MASS INDEX: 33.66 KG/M2 | SYSTOLIC BLOOD PRESSURE: 112 MMHG | DIASTOLIC BLOOD PRESSURE: 78 MMHG

## 2022-03-16 DIAGNOSIS — F32.A DEPRESSION AFFECTING PREGNANCY: ICD-10-CM

## 2022-03-16 DIAGNOSIS — Z36.89 ENCOUNTER FOR OTHER SPECIFIED ANTENATAL SCREENING: Primary | ICD-10-CM

## 2022-03-16 DIAGNOSIS — Z34.91 FIRST TRIMESTER PREGNANCY: ICD-10-CM

## 2022-03-16 DIAGNOSIS — O99.340 DEPRESSION AFFECTING PREGNANCY: ICD-10-CM

## 2022-03-16 DIAGNOSIS — O99.211 OBESITY AFFECTING PREGNANCY IN FIRST TRIMESTER: ICD-10-CM

## 2022-03-16 DIAGNOSIS — N91.2 AMENORRHEA: Primary | ICD-10-CM

## 2022-03-16 PROBLEM — O99.210 OBESITY AFFECTING PREGNANCY: Status: ACTIVE | Noted: 2022-03-16

## 2022-03-16 LAB
EXTERNAL CHLAMYDIA SCREEN: NEGATIVE
EXTERNAL GONORRHEA SCREEN: NEGATIVE
SL AMB  POCT GLUCOSE, UA: NEGATIVE
SL AMB POCT URINE PROTEIN: NEGATIVE

## 2022-03-16 PROCEDURE — PNV: Performed by: STUDENT IN AN ORGANIZED HEALTH CARE EDUCATION/TRAINING PROGRAM

## 2022-03-16 PROCEDURE — OBC: Performed by: STUDENT IN AN ORGANIZED HEALTH CARE EDUCATION/TRAINING PROGRAM

## 2022-03-16 NOTE — ASSESSMENT & PLAN NOTE
- Reviewed risks of SSRI in pregnancy, including delayed transition of the , persistent pulmonary hypertension, increased risk of NICU admission  For many patients, benefits of continuing SSRI outweigh risks  Patient reports stable depression on Zoloft and will continue in pregnancy

## 2022-03-16 NOTE — PATIENT INSTRUCTIONS
Pregnancy   AMBULATORY CARE:   What you need to know about pregnancy:  A normal pregnancy lasts about 40 weeks  The first trimester lasts from your last period through the 12th week of pregnancy  The second trimester lasts from the 13th week through the 23rd week  The third trimester lasts from the 24th week until your baby is born  If you know the date of your last period, your healthcare provider can estimate your due date  You may give birth to your baby any time from 37 weeks to 2 weeks after your due date  Seek care immediately if:   · You develop a severe headache that does not go away  · You have new or increased vision changes, such as blurred or spotted vision  · You have new or increased swelling in your face or hands  · You have pain or cramping in your abdomen or low back  · You have vaginal bleeding  Call your doctor or obstetrician if:   · You have abdominal cramps, pressure, or tightening  · You have a change in vaginal discharge  · You cannot keep food or drinks down, and you are losing weight  · You have chills or a fever  · You have vaginal itching, burning, or pain  · You have yellow, green, white, or foul-smelling vaginal discharge  · You have pain or burning when you urinate, less urine than usual, or pink or bloody urine  · You have questions or concerns about your condition or care  Prenatal care:  Prenatal care is a series of visits with your healthcare provider throughout your pregnancy  Prenatal care can help prevent problems during pregnancy and childbirth  At each prenatal visit, your provider will weigh you and check your blood pressure  He or she will also check your baby's heartbeat and growth  You may also need the following at some visits:  · A pelvic exam  allows your healthcare provider to see your cervix (the bottom part of your uterus)  Your healthcare provider will use a speculum to open your vagina   He or she will check the size and shape of your uterus  At your first prenatal visit, you may also have a Pap smear  This is a test to check your cervix for abnormal cells  · Blood tests  may be done to check for any of the following:     ? Gestational diabetes or anemia (low iron level)    ? Blood type or Rh factor, or certain birth defects    ? Immunity to certain diseases, such as chickenpox or rubella    ? An infection, such as a sexually transmitted infection, HIV, or hepatitis B    · Hepatitis B  may need to be prevented or treated  Hepatitis B is inflammation of the liver caused by the hepatitis B virus (HBV)  HBV can spread from a mother to her baby during delivery  You will be checked for HBV as early as possible in the first trimester of each pregnancy  You need the test even if you received the hepatitis B vaccine or were tested before  You may need to have an HBV infection treated before you give birth  · Urine tests  may also be done to check for sugar and protein  These can be signs of gestational diabetes or preeclampsia  Urine tests may also be done to check for signs of infection  · A gestational diabetes screen  may be done  Your healthcare provider may order either a 1-step or 2-step oral glucose tolerance test (OGTT)  ? 1-step OGTT:  Your blood sugar level will be tested after you have not eaten for 8 hours (fasting)  You will then be given a glucose drink  Your level will be tested again 1 hour and 2 hours after you finish the drink  ? 2-step OGTT:  You do not have to fast for the first part of the test  You will have the glucose drink at any time of day  Your blood sugar level will be checked 1 hour later  If your blood sugar is higher than a certain level, another test will be ordered  You will fast and your blood sugar level will be tested  You will have the glucose drink  Your blood will be tested again 1 hour, 2 hours, and 3 hours after you finish the glucose drink      · A fetal ultrasound  shows pictures of your baby inside your uterus  The pictures are used to check your baby's development, movement, and position  · Genetic disorder screening tests  may be offered to you  These tests check your baby's risk for genetic disorders such as Down syndrome  A screening test may include blood tests and an ultrasound  Blood tests may be used to check your DNA or your partner's DNA  Genetic tests are not always accurate or complete  Your baby may be born with a genetic disorder that did not show up in the tests  Talk to your healthcare provider about any concerns you have with genetic testing  Body changes that may occur during your pregnancy:   · Breast changes  you will experience include tenderness and tingling during the early part of your pregnancy  Your breasts will become larger  You may need to use a support bra  You may see a thin, yellow fluid, called colostrum, leak from your nipples during the second trimester  Colostrum is a liquid that changes to milk about 3 days after you give birth  · Skin changes and stretch marks  may occur during your pregnancy  You may have red marks, called stretch marks, on your skin  Stretch marks will usually fade after pregnancy  Use lotion if your skin is dry and itchy  The skin on your face, around your nipples, and below your belly button may darken  Most of the time, your skin will return to its normal color after your baby is born  · Morning sickness  is nausea and vomiting that can happen at any time of day  Avoid fatty and spicy foods  Eat small meals throughout the day instead of large meals  Poppy may help to decrease nausea  Ask your healthcare provider about other ways of decreasing nausea and vomiting  · Heartburn  may be caused by changes in your hormones during pregnancy  Your growing uterus may also push your stomach upward and force stomach acid to back up into your esophagus  Eat 4 or 5 small meals each day instead of large meals  Avoid spicy foods  Avoid eating right before bedtime  · Constipation  may develop during your pregnancy  To treat constipation, eat foods high in fiber such as fiber cereals, beans, fruits, vegetables, whole-grain breads, and prune juice  Get regular exercise and drink plenty of water  Your healthcare provider may also suggest a fiber supplement to soften your bowel movements  Talk to your healthcare provider before you use any medicines to decrease constipation  · Hemorrhoids  are enlarged veins in the rectal area  They may cause pain, itching, and bright red bleeding from your rectum  To decrease your risk for hemorrhoids, prevent constipation and do not strain to have a bowel movement  If you have hemorrhoids, soak in a tub of warm water to ease discomfort  Ask your healthcare provider how you can treat hemorrhoids  · Leg cramps and swelling  may be caused by low calcium levels or the added weight of pregnancy  Raise your legs above the level of your heart to decrease swelling  During a leg cramp, stretch or massage the muscle that has the cramp  Heat may help decrease pain and muscle spasms  Apply heat on your muscle for 20 to 30 minutes every 2 hours for as many days as directed  · Back pain  may occur as your baby grows  Do not stand for long periods of time or lift heavy items  Use good posture while you stand, squat, or bend  Wear low-heeled shoes with good support  Rest may also help to relieve back pain  Ask your healthcare provider about exercises you can do to strengthen your back muscles  Stay healthy during your pregnancy:       · Eat a variety of healthy foods  Healthy foods include fruits, vegetables, whole-grain breads, low-fat dairy foods, beans, lean meats, and fish  Drink liquids as directed  Ask how much liquid to drink each day and which liquids are best for you  Limit caffeine to less than 200 milligrams each day  Limit your intake of fish to 2 servings each week   Choose fish low in mercury such as canned light tuna, shrimp, crab, salmon, cod, or tilapia  Do not  eat fish high in mercury such as swordfish, tilefish, tom mackerel, and shark  · Take prenatal vitamins as directed  Your need for certain vitamins and minerals, such as folic acid, increases during pregnancy  Prenatal vitamins provide some of the extra vitamins and minerals you need  Prenatal vitamins may also help to decrease the risk for certain birth defects  · Ask how much weight you should gain during your pregnancy  Too much or too little weight gain can be unhealthy for you and your baby  · Talk to your healthcare provider about exercise  Moderate exercise can help you stay fit  Your healthcare provider will help you plan an exercise program that is safe for you during pregnancy  · Do not smoke  Smoking increases your risk for a miscarriage and heart and blood vessel problems  Smoking can cause your baby to be born too early or weigh less at birth  Quit smoking as soon as you think you might be pregnant  Ask your healthcare provider for information if you need help quitting  · Do not drink alcohol  Alcohol passes from your body to your baby through the placenta  It can affect your baby's brain development and cause fetal alcohol syndrome (FAS)  FAS is a group of conditions that causes mental, behavior, and growth problems  · Talk to your healthcare provider before you take any medicines  Many medicines may harm your baby if you take them when you are pregnant  Do not take any medicines, vitamins, herbs, or supplements without first talking to your healthcare provider  Never use illegal or street drugs (such as marijuana or cocaine) while you are pregnant  Safety tips:   · Avoid hot tubs and saunas  Do not use a hot tub or sauna while you are pregnant, especially during your first trimester   Hot tubs and saunas may raise your baby's temperature and increase the risk for birth defects  · Avoid toxoplasmosis  This is an infection caused by eating raw meat or being around infected cat feces  It can cause birth defects, miscarriages, and other problems  Wash your hands after you touch raw meat  Make sure any meat is well-cooked before you eat it  Avoid raw eggs and unpasteurized milk  Use gloves or ask someone else to clean your cat's litter box while you are pregnant  · Ask your healthcare provider about travel  The most comfortable time to travel is during the second trimester  Ask your healthcare provider if you can travel after 36 weeks  You may not be able to travel in an airplane after 36 weeks  He or she may also recommend that you avoid long road trips  Follow up with your doctor or obstetrician as directed:  Go to all of your prenatal visits during your pregnancy  Write down your questions so you remember to ask them during your visits  © Copyright [employer] 2022 Information is for End User's use only and may not be sold, redistributed or otherwise used for commercial purposes  All illustrations and images included in CareNotes® are the copyrighted property of [employer]  or [employer]   The above information is an  only  It is not intended as medical advice for individual conditions or treatments  Talk to your doctor, nurse or pharmacist before following any medical regimen to see if it is safe and effective for you

## 2022-03-16 NOTE — ASSESSMENT & PLAN NOTE
- Reviewed recommendation for early 1 hour GTT for DM screening  Will complete with initial prenatal panel    - Reviewed target weight gain of 11-20 lbs in pregnancy

## 2022-03-16 NOTE — PROGRESS NOTES
[address], [address], [facility], [address] [facility]    Assessment/Plan:  Carolina Moralez is a 27y o  year old who presents for evaluation of amenorrhea  Amenorrhea  - Single live IUP identified on US today  Estimated Date of Delivery: 22 based on ultrasound today  - Ultrasound completed, please see Chart Review - Ob Procedures, Ultrasound Report for Interpretation   - Aneuploidy screening discussed  Patient desires aneuploidy screening  Referral to [facility] provided today for genetics and early anatomy ultrasound    - Routine cervical cancer screening: Pap Up to date  - Routine STI Screening: GC/Chlamydia sent today  HIV/Hep B/Syphilis ordered in prenatal panel   - Patient Education: Patient was counseled regarding diet, exercise, weight gain, foods to avoid, vaccines in pregnancy, aneuploidy screening, travel precautions to include seat belt use and VTE risk reduction  She has been provided our pregnancy packet which includes how and when to contact providers, medication recommendations, dietary suggestions, breastfeeding information as well as websites for additional information, hospital and delivery concerns  Additional Pregnancy Problems:   1  Amenorrhea  Comments:  Given US dating discordance of 14 days from LMP, will returnin 2 weeks for repeat ultrasound to assess interval growth and confirm viability  Orders:  -     AMB US OB < 14 weeks single or first gestation level 1    2  First trimester pregnancy  -     Ambulatory Referral to Maternal Fetal Medicine; Future; Expected date: 2022    3  Depression affecting pregnancy  Assessment & Plan:  - Reviewed risks of SSRI in pregnancy, including delayed transition of the , persistent pulmonary hypertension, increased risk of NICU admission  For many patients, benefits of continuing SSRI outweigh risks  Patient reports stable depression on Zoloft and will continue in pregnancy           Subjective:   CC: Amenorrhea  Joanna Lucas is a 27 y o   female who presents for amenorrhea, now with confirmed viable pregnancy  Pregnancy ROS: No leakage of fluid, pelvic pain, or vaginal bleeding  No nausea/vomiting  Past Medical History:   Diagnosis Date    Chronic fatigue     Depression     currently on Zoloft 50 mg managed by PCP        Past Surgical History:   Procedure Laterality Date    TONSILECTOMY AND ADNOIDECTOMY      WISDOM TOOTH EXTRACTION  2019     Family History   Problem Relation Age of Onset    Cancer Maternal Grandfather     Lung cancer Maternal Grandfather     Thyroid disease Mother     Breast cancer Neg Hx     Ovarian cancer Neg Hx     Colon cancer Neg Hx     Down syndrome Neg Hx     Fragile X syndrome Neg Hx     Cystic fibrosis Neg Hx      Social History     Socioeconomic History    Marital status: /Civil Union     Spouse name: Not on file    Number of children: Not on file    Years of education: Not on file    Highest education level: Not on file   Occupational History    Occupation: LPN    Tobacco Use    Smoking status: Former Smoker     Packs/day: 0 50     Years: 10 00     Pack years: 5 00     Types: Cigarettes     Quit date: 2019     Years since quittin 8    Smokeless tobacco: Never Used   Vaping Use    Vaping Use: Never used   Substance and Sexual Activity    Alcohol use: Not Currently     Alcohol/week: 2 0 standard drinks     Types: 2 Standard drinks or equivalent per week    Drug use: Never    Sexual activity: Yes     Partners: Male     Birth control/protection: None   Other Topics Concern    Not on file   Social History Narrative    Not on file     Social Determinants of Health     Financial Resource Strain: Not on file   Food Insecurity: Not on file   Transportation Needs: Not on file   Physical Activity: Inactive    Days of Exercise per Week: 0 days   [employer] of Exercise per Session: 0 min   Stress: Stress Concern Present    Feeling of Stress : Very much   Social Connections: Not on file   Intimate Partner Violence: Not At Risk    Fear of Current or Ex-Partner: No    Emotionally Abused: No    Physically Abused: No    Sexually Abused: No   Housing Stability: Not on file     No outpatient medications have been marked as taking for the 3/16/22 encounter (Initial Prenatal) with Dalton Dooley MD      No Known Allergies          Objective:     LMP 01/15/2022   Pregravid Weight/BMI: 86 2 kg (190 lb) (BMI 33 67)  Current Weight:     Total Weight Gain: 0 kg (0 lb)   Pre- Vitals      Most Recent Value   Prenatal Assessment    Fetal Heart Rate 129   Fundal Height (cm) 6 cm   Prenatal Vitals    Urine Albumin/Glucose    Dilation/Effacement/Station    Cervical Dilation 0   Cervical Effacement 0   Vaginal Drainage    Draining Fluid No   Edema    LLE Edema None   RLE Edema None   Facial Edema None           Chaperone present? Yes: Guillermina Townsend RN  General Appearance: alert and oriented, in no acute distress  Neck/Thyroid: No thyromegaly, no thyroid nodules  Respiratory: Unlabored breathing  Cardiovascular: Regular rate, no peripheral edema  Abdomen: Soft, non-tender, non-distended, no masses, no rebound or guarding  Breast Exam: No dimpling, nipple retraction or discharge  No lumps or masses  No axillary or supraclavicular nodes  Pelvic:       External genitalia: Normal appearance, no abnormal pigmentation, no lesions or masses  Normal Bartholin's and Hammon's  Urinary system: Urethral meatus normal, bladder non-tender  Vaginal: normal mucosa without prolapse or lesions  Normal-appearing physiologic discharge  Cervix: Normal-appearing, well-epithelialized, no gross lesions or masses  No cervical motion tenderness  Adnexa: No adnexal masses or tenderness noted  Uterus: Approximately 6 week-sized, regular contour, midline, mobile, no uterine tenderness  Extremities: Normal range of motion   Warm, well-perfused, non-tender     Skin: normal, no rash or abnormalities  Neurologic: alert, oriented x3  Psychiatric: Appropriate affect, mood stable, cooperative with exam         Robbin Booker MD  3/16/2022 2:15 PM

## 2022-03-16 NOTE — PROGRESS NOTES
Patient is    27 y o  who presents for OB intake at    8 4 weeks  G  1 P 0  LMP:  01/15/22    KALEN by LMP: 10/22/22      BMI: 33 66 Will need early 1 hr GTT with prenatal labs  PMH:   Depression: Currently on Zoloft 50 mg and managed by PCP Dr Fletcher Schaumann  She score 9 on PHQ9     She had a pap done 01/18/22 and came back negative pap/(-) HPV  Her preferred lab is  [facility]         She denies nausea/vomiting and spotting  She desires genetic screening test but declined CF and SMA carrier screen  Covid-19 vaccines doses 1 and 2 completed; no booster yet  Flu vaccine completed  Prenatal teaching provided  Advised pt she can access the "Pregnancy Essential Guide" on [employer]), then hover over tab for [employer]", then select "Women's Health", and then "Obsetrics"  She states she had no questions she can think of to ask the nurse; advised if any questions please give us a call  She aware if it is an emergency to please do not hesitate to call as we have an on-call provider 24/7  Prenatal folder and labs provided  All questions answered  Pt aware to return in 2 weeks for repeat ultrasound per Dr Alivia Fernandes and then after everything confirmed, then will schedule routine OB visit in 4 weeks

## 2022-03-19 LAB
C TRACH RRNA SPEC QL NAA+PROBE: NEGATIVE
N GONORRHOEA RRNA SPEC QL NAA+PROBE: NEGATIVE

## 2022-03-20 DIAGNOSIS — F32.0 CURRENT MILD EPISODE OF MAJOR DEPRESSIVE DISORDER WITHOUT PRIOR EPISODE (HCC): ICD-10-CM

## 2022-04-05 LAB
EXTERNAL ANTIBODY SCREEN: NORMAL
EXTERNAL HEMOGLOBIN: 12.7 G/DL
EXTERNAL HEPATITIS B SURFACE ANTIGEN: NEGATIVE
EXTERNAL HIV-1/2 AB-AG: NORMAL
EXTERNAL PLATELET COUNT: 228 K/ΜL
EXTERNAL RH FACTOR: NEGATIVE
EXTERNAL RUBELLA IGG QUANTITATION: NORMAL
EXTERNAL SYPHILIS RPR SCREEN: NORMAL

## 2022-04-06 ENCOUNTER — RA CDI HCC (OUTPATIENT)
Dept: OTHER | Facility: HOSPITAL | Age: 32
End: 2022-04-06

## 2022-04-06 LAB
ABO GROUP BLD: ABNORMAL
APPEARANCE UR: CLEAR
BACTERIA UR CULT: NORMAL
BACTERIA URNS QL MICRO: NORMAL
BASOPHILS # BLD AUTO: 0 X10E3/UL (ref 0–0.2)
BASOPHILS NFR BLD AUTO: 0 %
BILIRUB UR QL STRIP: NEGATIVE
BLD GP AB SCN SERPL QL: NEGATIVE
CASTS URNS QL MICRO: NORMAL /LPF
COLOR UR: YELLOW
EOSINOPHIL # BLD AUTO: 0 X10E3/UL (ref 0–0.4)
EOSINOPHIL NFR BLD AUTO: 0 %
EPI CELLS #/AREA URNS HPF: NORMAL /HPF (ref 0–10)
ERYTHROCYTE [DISTWIDTH] IN BLOOD BY AUTOMATED COUNT: 11.8 % (ref 11.7–15.4)
GLUCOSE 1H P 50 G GLC PO SERPL-MCNC: 149 MG/DL (ref 65–139)
GLUCOSE UR QL: NEGATIVE
HBV SURFACE AG SERPL QL IA: NEGATIVE
HCT VFR BLD AUTO: 37.4 % (ref 34–46.6)
HCV AB S/CO SERPL IA: <0.1 S/CO RATIO (ref 0–0.9)
HGB BLD-MCNC: 12.7 G/DL (ref 11.1–15.9)
HGB UR QL STRIP: NEGATIVE
HIV 1+2 AB+HIV1 P24 AG SERPL QL IA: NON REACTIVE
IMM GRANULOCYTES # BLD: 0 X10E3/UL (ref 0–0.1)
IMM GRANULOCYTES NFR BLD: 0 %
KETONES UR QL STRIP: NEGATIVE
LEUKOCYTE ESTERASE UR QL STRIP: ABNORMAL
LYMPHOCYTES # BLD AUTO: 2.3 X10E3/UL (ref 0.7–3.1)
LYMPHOCYTES NFR BLD AUTO: 23 %
Lab: NORMAL
MCH RBC QN AUTO: 33.2 PG (ref 26.6–33)
MCHC RBC AUTO-ENTMCNC: 34 G/DL (ref 31.5–35.7)
MCV RBC AUTO: 98 FL (ref 79–97)
MICRO URNS: ABNORMAL
MONOCYTES # BLD AUTO: 0.6 X10E3/UL (ref 0.1–0.9)
MONOCYTES NFR BLD AUTO: 6 %
NEUTROPHILS # BLD AUTO: 7.1 X10E3/UL (ref 1.4–7)
NEUTROPHILS NFR BLD AUTO: 71 %
NITRITE UR QL STRIP: NEGATIVE
PH UR STRIP: 7 [PH] (ref 5–7.5)
PLATELET # BLD AUTO: 228 X10E3/UL (ref 150–450)
PROT UR QL STRIP: NEGATIVE
RBC # BLD AUTO: 3.82 X10E6/UL (ref 3.77–5.28)
RBC #/AREA URNS HPF: NORMAL /HPF (ref 0–2)
RH BLD: NEGATIVE
RPR SER QL: NON REACTIVE
RUBV IGG SERPL IA-ACNC: 6.64 INDEX
SP GR UR: 1 (ref 1–1.03)
UROBILINOGEN UR STRIP-ACNC: 0.2 MG/DL (ref 0.2–1)
WBC # BLD AUTO: 9.9 X10E3/UL (ref 3.4–10.8)
WBC #/AREA URNS HPF: NORMAL /HPF (ref 0–5)

## 2022-04-07 ENCOUNTER — TELEPHONE (OUTPATIENT)
Dept: OBGYN CLINIC | Facility: CLINIC | Age: 32
End: 2022-04-07

## 2022-04-07 ENCOUNTER — ROUTINE PRENATAL (OUTPATIENT)
Dept: OBGYN CLINIC | Facility: CLINIC | Age: 32
End: 2022-04-07
Payer: COMMERCIAL

## 2022-04-07 VITALS
BODY MASS INDEX: 27.29 KG/M2 | WEIGHT: 154 LBS | HEIGHT: 63 IN | SYSTOLIC BLOOD PRESSURE: 122 MMHG | DIASTOLIC BLOOD PRESSURE: 82 MMHG

## 2022-04-07 DIAGNOSIS — O99.340 DEPRESSION AFFECTING PREGNANCY: ICD-10-CM

## 2022-04-07 DIAGNOSIS — F32.A DEPRESSION AFFECTING PREGNANCY: ICD-10-CM

## 2022-04-07 DIAGNOSIS — Z3A.09 9 WEEKS GESTATION OF PREGNANCY: Primary | ICD-10-CM

## 2022-04-07 DIAGNOSIS — O99.211 OBESITY AFFECTING PREGNANCY IN FIRST TRIMESTER: ICD-10-CM

## 2022-04-07 DIAGNOSIS — O99.810 ABNORMAL GLUCOSE TOLERANCE IN PREGNANCY: Primary | ICD-10-CM

## 2022-04-07 DIAGNOSIS — O99.810 ABNORMAL GLUCOSE TOLERANCE IN PREGNANCY: ICD-10-CM

## 2022-04-07 DIAGNOSIS — Z67.91 RH NEGATIVE STATUS DURING PREGNANCY IN FIRST TRIMESTER: ICD-10-CM

## 2022-04-07 DIAGNOSIS — O26.891 RH NEGATIVE STATUS DURING PREGNANCY IN FIRST TRIMESTER: ICD-10-CM

## 2022-04-07 PROBLEM — O26.899 RH NEGATIVE STATUS DURING PREGNANCY: Status: ACTIVE | Noted: 2022-04-07

## 2022-04-07 LAB
SL AMB  POCT GLUCOSE, UA: NORMAL
SL AMB POCT URINE PROTEIN: NORMAL

## 2022-04-07 PROCEDURE — 76817 TRANSVAGINAL US OBSTETRIC: CPT | Performed by: OBSTETRICS & GYNECOLOGY

## 2022-04-07 PROCEDURE — PNV: Performed by: OBSTETRICS & GYNECOLOGY

## 2022-04-07 NOTE — TELEPHONE ENCOUNTER
Please call patient regarding her abnormal early 1 hour GTT result (currently 6w7d gestational age)  Please have her complete early 3 hour GTT, which is now ordered   Please also discuss her Rh negative blood type, including routine bleeding precautions and need for Freedom Cai MD  4/7/2022 7:34 AM

## 2022-04-07 NOTE — TELEPHONE ENCOUNTER
Patient have appointment today for repeat ultrasound  , order printed and will discuss at visit w/ Dr Ceci Bowie

## 2022-04-07 NOTE — PROGRESS NOTES
Routine Prenatal Visit  [address] [facility], [address], [facility], [address] [facility]    Assessment/Plan:  Matt Garcia is a 34y o  year old  at 6w7d who presents for routine prenatal visit  1  9 weeks gestation of pregnancy  -     POCT urine dip  -     AMB US OB < 14 weeks single or first gestation level 1    2  Rh negative status during pregnancy in first trimester    3  Abnormal glucose tolerance in pregnancy  -     AMB US OB < 14 weeks single or first gestation level 1        -  Order for 3 Hr GTT provided  4  Obesity affecting pregnancy in first trimester  -     AMB US OB < 14 weeks single or first gestation level 1    5  Depression affecting pregnancy  -     AMB US OB < 14 weeks single or first gestation level 1    Next OB Visit 4 weeks  Subjective:     CC: Prenatal care    Elena Dickinson is a 34 y o  Dominick Lemme female who presents for routine prenatal care at 9w5d  Pregnancy ROS: no leakage of fluid, pelvic pain, or vaginal bleeding  nml fetal movement      The following portions of the patient's history were reviewed and updated as appropriate: allergies, current medications, past family history, past medical history, obstetric history, gynecologic history, past social history, past surgical history and problem list       Objective:  /82 (BP Location: Left arm, Patient Position: Sitting, Cuff Size: Standard)   Ht 5' 3" (1 6 m)   Wt 69 9 kg (154 lb)   LMP 01/15/2022   BMI 27 28 kg/m²   Pregravid Weight/BMI: 86 2 kg (190 lb) (BMI 33 67)  Current Weight: 69 9 kg (154 lb)   Total Weight Gain: -16 3 kg (-36 lb)   Pre-Sofia Vitals      Most Recent Value   Prenatal Assessment    Fetal Heart Rate 150   Fundal Height (cm) 10 cm   Movement Absent   Prenatal Vitals    Blood Pressure 122/82   Weight - Scale 69 9 kg (154 lb)   Urine Albumin/Glucose    Dilation/Effacement/Station    Cervical Dilation 0   Cervical Effacement 0   Vaginal Drainage    Draining Fluid No   Edema General: Well appearing, no distress  Abdomen: Soft, gravid, nontender  Fundal Height: Appropriate for gestational age  Extremities: Warm and well perfused  Non tender

## 2022-04-14 DIAGNOSIS — F32.0 CURRENT MILD EPISODE OF MAJOR DEPRESSIVE DISORDER WITHOUT PRIOR EPISODE (HCC): ICD-10-CM

## 2022-04-25 ENCOUNTER — ROUTINE PRENATAL (OUTPATIENT)
Dept: PERINATAL CARE | Facility: OTHER | Age: 32
End: 2022-04-25
Payer: COMMERCIAL

## 2022-04-25 VITALS
HEART RATE: 86 BPM | BODY MASS INDEX: 34.73 KG/M2 | HEIGHT: 63 IN | DIASTOLIC BLOOD PRESSURE: 75 MMHG | SYSTOLIC BLOOD PRESSURE: 131 MMHG | WEIGHT: 196 LBS

## 2022-04-25 DIAGNOSIS — Z34.91 FIRST TRIMESTER PREGNANCY: ICD-10-CM

## 2022-04-25 DIAGNOSIS — Z3A.12 12 WEEKS GESTATION OF PREGNANCY: Primary | ICD-10-CM

## 2022-04-25 DIAGNOSIS — Z36.82 ENCOUNTER FOR (NT) NUCHAL TRANSLUCENCY SCAN: ICD-10-CM

## 2022-04-25 PROBLEM — O99.210 OBESITY AFFECTING PREGNANCY: Status: ACTIVE | Noted: 2021-07-19

## 2022-04-25 PROCEDURE — 76813 OB US NUCHAL MEAS 1 GEST: CPT | Performed by: OBSTETRICS & GYNECOLOGY

## 2022-04-25 PROCEDURE — 3008F BODY MASS INDEX DOCD: CPT | Performed by: OBSTETRICS & GYNECOLOGY

## 2022-04-25 PROCEDURE — 99203 OFFICE O/P NEW LOW 30 MIN: CPT | Performed by: OBSTETRICS & GYNECOLOGY

## 2022-04-25 PROCEDURE — 1036F TOBACCO NON-USER: CPT | Performed by: OBSTETRICS & GYNECOLOGY

## 2022-04-25 RX ORDER — ASPIRIN 81 MG/1
162 TABLET, CHEWABLE ORAL DAILY
COMMUNITY

## 2022-04-25 NOTE — PATIENT INSTRUCTIONS
Please refer to " Ultrasound" under test results in [place] for today's ultrasound findings  Congratulations, and best wishes for a healthy remainder of the pregnancy! The use of low dose aspirin in pregnancy (162mg) is recommended in women with a high risk, or multiple moderate risk factors for preeclampsia  Aspirin therapy should be initiated between 12-28 weeks gestation, and is most effective if started prior to 16 weeks gestation, and continued until 36 weeks gestation  Low dose aspirin in pregnancy has been shown to reduce the incidence of preeclampsia in women with risk factors, and has been shown to be safe and without significant maternal or fetal risk  In light of your risk factors for preeclampsia, including: Primiparity (first pregnancy) and Body Mass Index 30 or greater I recommend initiating aspirin therapy  Thank you for choosing [facility] for your visit today  We appreciate your trust and the opportunity to assist your obstetrician with your care  We value your feedback regarding the care we are providing  Following today's appointment, you may receive a patient satisfaction survey by mail or e-mail requesting feedback on your visit  We ask that you complete the survey to  help us understand how we are doing  Thank you for in advance for your feedback

## 2022-04-25 NOTE — PROGRESS NOTES
[id number] [facility]: Ms Rob Londono was seen today at 12w2d for nuchal translucency ultrasound  See ultrasound report under "OB Procedures" tab  My recommendations are as follows:  1  We reviewed the availability of genetic screening, as well as diagnostic testing, which are available to all pregnant women  We reviewed limitations, risks, and benefits of screening and testing  She does not wish to pursue aneuploidy screening or diagnostic testing at this time  MSAFP screening should be ordered through your office at 15-20 weeks gestation, and completed prior to fetal anatomic survey  A detailed anatomic survey as well as transvaginal cervical length screening are recommended between 18-22 weeks gestation  2  The use of low dose aspirin in pregnancy (162mg) is recommended in women with a high risk, or multiple moderate risk factors for preeclampsia  Aspirin therapy should be initiated between 12-28 weeks gestation, and is most effective if started prior to 16 weeks gestation, and stopped by 36 weeks gestation  Low dose aspirin in pregnancy has been shown to reduce the incidence of preeclampsia in women with risk factors, and has been shown to be safe and without significant maternal or fetal risk  In light of her risk factors which include primigravida and BMI >30kg/m2, I recommend initiating aspirin therapy, which she has at home and plans to initiate  We discussed the recommendation for 150 minutes of exercise per week in pregnancy, and gestational weight gain of 11-20 pounds  3  The use of selective serotonin reuptake inhibitors (SSRIs) in pregnancy is associated with approximately a 30% risk of transient  withdrawal, which is typically self-limited and does not usually require  intensive care  There is also approximately a 1% risk of persistent pulmonary hypertension of the  (PPHN), which can require  intensive care treatment   Importantly, this risk also exists in the general population  Decisions about maternal medication use in pregnancy should be individualized, and I support her continuing her Zoloft based on her symptoms  If desired, neonatology consultation is available in the third trimester to further discuss these  complications  I recommend early postpartum follow-up of her mood, and referral for therapy if needed       Please don't hesitate to contact our office with any concerns or questions     -Cony Stephens MD

## 2022-04-25 NOTE — LETTER
April 25, 2022     MD Fouzia Dunn [address]  [address]  [facility] [address]    Patient: Reyna Tolbert   YOB: 1990   Date of Visit: 4/25/2022       Dear Dr Guerrero Lunch:    Thank you for referring Reyna Tolbert to me for evaluation  Below are my notes for this consultation  If you have questions, please do not hesitate to call me  I look forward to following your patient along with you  Sincerely,        Haylee Rojas MD        CC: No Recipients  Haylee Rojas MD  4/25/2022  6:19 PM  Sign when Signing Visit  [address]: Ms Rob Londono was seen today at 12w2d for nuchal translucency ultrasound  See ultrasound report under "OB Procedures" tab  My recommendations are as follows:  1  We reviewed the availability of genetic screening, as well as diagnostic testing, which are available to all pregnant women  We reviewed limitations, risks, and benefits of screening and testing  She does not wish to pursue aneuploidy screening or diagnostic testing at this time  MSAFP screening should be ordered through your office at 15-20 weeks gestation, and completed prior to fetal anatomic survey  A detailed anatomic survey as well as transvaginal cervical length screening are recommended between 18-22 weeks gestation  2  The use of low dose aspirin in pregnancy (162mg) is recommended in women with a high risk, or multiple moderate risk factors for preeclampsia  Aspirin therapy should be initiated between 12-28 weeks gestation, and is most effective if started prior to 16 weeks gestation, and stopped by 36 weeks gestation  Low dose aspirin in pregnancy has been shown to reduce the incidence of preeclampsia in women with risk factors, and has been shown to be safe and without significant maternal or fetal risk   In light of her risk factors which include primigravida and BMI >30kg/m2, I recommend initiating aspirin therapy, which she has at home and plans to initiate  We discussed the recommendation for 150 minutes of exercise per week in pregnancy, and gestational weight gain of 11-20 pounds  3  The use of selective serotonin reuptake inhibitors (SSRIs) in pregnancy is associated with approximately a 30% risk of transient  withdrawal, which is typically self-limited and does not usually require  intensive care  There is also approximately a 1% risk of persistent pulmonary hypertension of the  (PPHN), which can require  intensive care treatment  Importantly, this risk also exists in the general population  Decisions about maternal medication use in pregnancy should be individualized, and I support her continuing her Zoloft based on her symptoms  If desired, neonatology consultation is available in the third trimester to further discuss these  complications  I recommend early postpartum follow-up of her mood, and referral for therapy if needed       Please don't hesitate to contact our office with any concerns or questions     -Lola Braun MD

## 2022-05-04 PROBLEM — O24.919 DIABETES IN PREGNANCY: Status: ACTIVE | Noted: 2022-04-07

## 2022-05-04 PROBLEM — O24.419 GESTATIONAL DIABETES: Status: ACTIVE | Noted: 2022-04-07

## 2022-05-04 LAB
GLUCOSE 1H P 100 G GLC PO SERPL-MCNC: 204 MG/DL (ref 65–179)
GLUCOSE 2H P 100 G GLC PO SERPL-MCNC: 193 MG/DL (ref 65–154)
GLUCOSE 3H P 100 G GLC PO SERPL-MCNC: 120 MG/DL (ref 65–139)
GLUCOSE P FAST SERPL-MCNC: 123 MG/DL (ref 65–94)
SL AMB NOTE:: ABNORMAL

## 2022-05-10 ENCOUNTER — TELEMEDICINE (OUTPATIENT)
Dept: PERINATAL CARE | Facility: CLINIC | Age: 32
End: 2022-05-10

## 2022-05-10 ENCOUNTER — ROUTINE PRENATAL (OUTPATIENT)
Dept: OBGYN CLINIC | Facility: CLINIC | Age: 32
End: 2022-05-10

## 2022-05-10 ENCOUNTER — OFFICE VISIT (OUTPATIENT)
Dept: FAMILY MEDICINE CLINIC | Facility: CLINIC | Age: 32
End: 2022-05-10
Payer: COMMERCIAL

## 2022-05-10 VITALS
DIASTOLIC BLOOD PRESSURE: 74 MMHG | BODY MASS INDEX: 35.08 KG/M2 | WEIGHT: 198 LBS | HEIGHT: 63 IN | SYSTOLIC BLOOD PRESSURE: 114 MMHG

## 2022-05-10 VITALS
DIASTOLIC BLOOD PRESSURE: 82 MMHG | BODY MASS INDEX: 34.76 KG/M2 | TEMPERATURE: 97.7 F | OXYGEN SATURATION: 99 % | HEART RATE: 102 BPM | SYSTOLIC BLOOD PRESSURE: 116 MMHG | WEIGHT: 196.2 LBS | HEIGHT: 63 IN | RESPIRATION RATE: 16 BRPM

## 2022-05-10 VITALS — HEIGHT: 63 IN | WEIGHT: 198 LBS | BODY MASS INDEX: 35.08 KG/M2

## 2022-05-10 DIAGNOSIS — F32.A DEPRESSION AFFECTING PREGNANCY: ICD-10-CM

## 2022-05-10 DIAGNOSIS — Z67.91 RH NEGATIVE STATUS DURING PREGNANCY IN SECOND TRIMESTER: ICD-10-CM

## 2022-05-10 DIAGNOSIS — O99.340 DEPRESSION AFFECTING PREGNANCY: ICD-10-CM

## 2022-05-10 DIAGNOSIS — R53.82 CHRONIC FATIGUE: ICD-10-CM

## 2022-05-10 DIAGNOSIS — Z3A.14 14 WEEKS GESTATION OF PREGNANCY: Primary | ICD-10-CM

## 2022-05-10 DIAGNOSIS — G47.19 EXCESSIVE DAYTIME SLEEPINESS: ICD-10-CM

## 2022-05-10 DIAGNOSIS — O99.212 OBESITY AFFECTING PREGNANCY IN SECOND TRIMESTER: ICD-10-CM

## 2022-05-10 DIAGNOSIS — O26.892 RH NEGATIVE STATUS DURING PREGNANCY IN SECOND TRIMESTER: ICD-10-CM

## 2022-05-10 DIAGNOSIS — R06.83 SNORING: ICD-10-CM

## 2022-05-10 DIAGNOSIS — O99.212 MATERNAL MORBID OBESITY IN SECOND TRIMESTER, ANTEPARTUM (HCC): Primary | ICD-10-CM

## 2022-05-10 DIAGNOSIS — E66.01 MATERNAL MORBID OBESITY, ANTEPARTUM, SECOND TRIMESTER (HCC): ICD-10-CM

## 2022-05-10 DIAGNOSIS — R73.03 PREDIABETES: ICD-10-CM

## 2022-05-10 DIAGNOSIS — Z3A.14 14 WEEKS GESTATION OF PREGNANCY: ICD-10-CM

## 2022-05-10 DIAGNOSIS — O24.419 GESTATIONAL DIABETES MELLITUS (GDM) IN SECOND TRIMESTER, GESTATIONAL DIABETES METHOD OF CONTROL UNSPECIFIED: ICD-10-CM

## 2022-05-10 DIAGNOSIS — E66.01 MATERNAL MORBID OBESITY IN SECOND TRIMESTER, ANTEPARTUM (HCC): Primary | ICD-10-CM

## 2022-05-10 DIAGNOSIS — Z36.9 ENCOUNTER FOR ANTENATAL SCREENING: ICD-10-CM

## 2022-05-10 DIAGNOSIS — F32.0 CURRENT MILD EPISODE OF MAJOR DEPRESSIVE DISORDER WITHOUT PRIOR EPISODE (HCC): Primary | ICD-10-CM

## 2022-05-10 DIAGNOSIS — O99.212 MATERNAL MORBID OBESITY, ANTEPARTUM, SECOND TRIMESTER (HCC): ICD-10-CM

## 2022-05-10 PROBLEM — R21 RASH AND NONSPECIFIC SKIN ERUPTION: Status: RESOLVED | Noted: 2021-12-28 | Resolved: 2022-05-10

## 2022-05-10 PROBLEM — R03.0 ELEVATED BP WITHOUT DIAGNOSIS OF HYPERTENSION: Status: RESOLVED | Noted: 2021-07-19 | Resolved: 2022-05-10

## 2022-05-10 LAB
SL AMB  POCT GLUCOSE, UA: NORMAL
SL AMB POCT URINE PROTEIN: NORMAL

## 2022-05-10 PROCEDURE — 3008F BODY MASS INDEX DOCD: CPT | Performed by: FAMILY MEDICINE

## 2022-05-10 PROCEDURE — 1036F TOBACCO NON-USER: CPT | Performed by: FAMILY MEDICINE

## 2022-05-10 PROCEDURE — PNV: Performed by: OBSTETRICS & GYNECOLOGY

## 2022-05-10 PROCEDURE — 99214 OFFICE O/P EST MOD 30 MIN: CPT | Performed by: FAMILY MEDICINE

## 2022-05-10 RX ORDER — LANCETS
EACH MISCELLANEOUS
Qty: 100 EACH | Refills: 7 | Status: SHIPPED | OUTPATIENT
Start: 2022-05-10

## 2022-05-10 RX ORDER — BLOOD SUGAR DIAGNOSTIC
STRIP MISCELLANEOUS
Qty: 100 EACH | Refills: 7 | Status: SHIPPED | OUTPATIENT
Start: 2022-05-10 | End: 2022-07-31 | Stop reason: SDUPTHER

## 2022-05-10 RX ORDER — BLOOD-GLUCOSE METER
EACH MISCELLANEOUS
Qty: 1 KIT | Refills: 0 | Status: SHIPPED | OUTPATIENT
Start: 2022-05-10

## 2022-05-10 NOTE — ASSESSMENT & PLAN NOTE
-Pre-pregnancy weight 190 lbs  -Current weight 198 lbs  -Recommended weight gain 11 to 20 lbs  -Start GDM diet   -Walk up to 30 minutes a day if no restrictions from your OB

## 2022-05-10 NOTE — ASSESSMENT & PLAN NOTE
Depression Screening Follow-up Plan: Patient's depression screening was positive with a PHQ-2 score of   Their PHQ-9 score was   Patient assessed for underlying major depression  They have no active suicidal ideations  Brief counseling provided and recommend additional follow-up/re-evaluation next office visit       Stable on zoloft

## 2022-05-10 NOTE — PROGRESS NOTES
Virtual Regular Visit    Verification of patient location:[place]    Patient is located in the following state in which I hold an active license [place]      Assessment/Plan:    Problem List Items Addressed This Visit        Endocrine    Gestational diabetes mellitus (GDM) in second trimester     -Check A1 and CMP  -A1c 5 6% or less   -Keep 3 day diet log to review with dietitian   -Follow up with dietitian    -Start self monitoring blood glucose fasting and 2 hours after start of each meal  Keep glucose log  Glucose goals: fasting 60-90 mg/dL, 140 mg/dL or less 1 hour post meals, and 120 mg/dL or less 2 hours post meal    -Report glucose readings weekly via [employer] every Tuesday   -Start GDM diet with 3 meals and 3 snacks including recommended combination of carb, protein and fat per meal/snack   -Please eat meal or snack every 2-3 5 hours while awake   -No more than 8 to 10 hours of fasting overnight   -Stay active if no restriction from your OB, walk up to 30 minutes a day  -Always have glucose available to treat hypoglycemia  Use 15:15 rule  Refer to hypoglycemia patient education sheet  Test blood sugar when experiencing signs and symptoms of hypoglycemia and prior to driving    -Continue prenatal vitamin and baby aspirin as recommended   -Stop baby aspirin at 36 weeks gestation    -Continue follow-up with your OB and MFM as recommended   -Stay in close contact with diabetes education team   -Insulin requirements during pregnancy; basal/bolus concept and Metformin discussed  -Very important to maintain tight glucose control during pregnancy to decrease risk factors including fetal macrosomia; birth injury; risk of ; polyhydramnios; pre-term labor; pre-eclampsia;  hypoglycemia; jaundice and stillbirth  -Via [employer] diabetes and pregnancy booklet; 1800/ calorie diet plan and hypoglycemia patient education           Lab Results   Component Value Date    HGBA1C 6 3 2021            Relevant Medications    Accu-Chek Guide test strip    Blood Glucose Monitoring Suppl (Accu-Chek Guide Me) w/Device KIT    Accu-Chek Softclix Lancets lancets    Other Relevant Orders    Mychart glucose flowsheet    TSH, 3rd generation with Free T4 reflex    Hemoglobin A1C    Comprehensive metabolic panel       Other    Maternal morbid obesity, antepartum, second trimester ([facility] [address] )     -Pre-pregnancy weight 190 lbs  -Current weight 198 lbs  -Recommended weight gain 11 to 20 lbs  -Start GDM diet   -Walk up to 30 minutes a day if no restrictions from your OB              BMI 35 0-35 9,adult    Relevant Medications    Accu-Chek Guide test strip    Blood Glucose Monitoring Suppl (Accu-Chek Guide Me) w/Device KIT    Accu-Chek Softclix Lancets lancets    Other Relevant Orders    Mychart glucose flowsheet    TSH, 3rd generation with Free T4 reflex    Hemoglobin A1C    Comprehensive metabolic panel    Maternal morbid obesity in second trimester, antepartum ([facility]) - Primary    Relevant Medications    Accu-Chek Guide test strip    Blood Glucose Monitoring Suppl (Accu-Chek Guide Me) w/Device KIT    Accu-Chek Softclix Lancets lancets    Other Relevant Orders    Mychart glucose flowsheet    TSH, 3rd generation with Free T4 reflex    Hemoglobin A1C    Comprehensive metabolic panel    14 weeks gestation of pregnancy    Relevant Medications    Accu-Chek Guide test strip    Blood Glucose Monitoring Suppl (Accu-Chek Guide Me) w/Device KIT    Accu-Chek Softclix Lancets lancets    Other Relevant Orders    Mychart glucose flowsheet    TSH, 3rd generation with Free T4 reflex    Hemoglobin A1C    Comprehensive metabolic panel    Prediabetes    Relevant Medications    Accu-Chek Guide test strip    Blood Glucose Monitoring Suppl (Accu-Chek Guide Me) w/Device KIT    Accu-Chek Softclix Lancets lancets    Other Relevant Orders    TSH, 3rd generation with Free T4 reflex    Hemoglobin A1C    Comprehensive metabolic panel               Reason for visit is Chief Complaint   Patient presents with    Virtual Regular Visit    Gestational Diabetes    Patient Education        Encounter provider Nasreen Webber    Provider located at [address] [place] [id number] [phone]      Recent Visits  No visits were found meeting these conditions  Showing recent visits within past 7 days and meeting all other requirements  Today's Visits  Date Type Provider Dept   05/10/22 [address] [facility], [address] [place] [address],[address] Floor   05/10/22 Office Visit John Farrar DO [facility]   Showing today's visits and meeting all other requirements  Future Appointments  No visits were found meeting these conditions  Showing future appointments within next 150 days and meeting all other requirements       The patient was identified by name and date of birth  Vicky Matos was informed that this is a telemedicine visit and that the visit is being conducted through [address] [place] [address] Drive and patient was informed this is a secure, HIPAA-complaint platform  She agrees to proceed  My office door was closed  No one else was in the room  She acknowledged consent and understanding of privacy and security of the video platform  The patient has agreed to participate and understands they can discontinue the visit at any time  Patient is aware this is a billable service  Subjective  Vicky Matos is a 34 y o  female  14 3/7 weeks gestation GDM  History of prediabetes with A1c 6 3%  Eats 3 meals and 1 to 2 snacks a day  Is a LPN and works in an office  Family history of diabetes  History of depression- on Zoloft, managed by PCP  History of snoring and fatigue; referred for sleep apnea evaluation; appointment is in      Past Medical History:   Diagnosis Date    Chronic fatigue     Depression     currently on Zoloft 50 mg managed by PCP          Past Surgical History:   Procedure Laterality Date    TONSILECTOMY AND ADNOIDECTOMY      WISDOM TOOTH EXTRACTION  2019       Current Outpatient Medications   Medication Sig Dispense Refill    aspirin 81 mg chewable tablet Chew 162 mg daily Stop at 36 weeks gestation      Prenatal MV-Min-Fe Fum-FA-DHA (PRENATAL 1 PO) Take 1 tablet by mouth daily      sertraline (ZOLOFT) 50 mg tablet TAKE 1 TABLET BY MOUTH EVERY DAY 30 tablet 0    Accu-Chek Guide test strip Test 4 times a day and as instructed  GDM  100 each 7    Accu-Chek Softclix Lancets lancets Use 4 a day  GDM  100 each 7    Blood Glucose Monitoring Suppl (Accu-Chek Guide Me) w/Device KIT Dispense 1 kit  GDM  1 kit 0    [employer]S FIBER GUMMIES PO Take by mouth       No current facility-administered medications for this visit  No Known Allergies    Review of Systems   Constitutional: Positive for fatigue  Negative for fever  HENT: Negative for congestion and trouble swallowing  Eyes: Negative for visual disturbance  Respiratory: Negative for cough and shortness of breath  Cardiovascular: Negative for chest pain and palpitations  Gastrointestinal: Positive for constipation (intermittently)  Negative for nausea and vomiting  Endocrine: Positive for polyphagia and polyuria  Negative for polydipsia  Genitourinary: Negative for difficulty urinating and vaginal bleeding  Musculoskeletal: Negative for back pain  Neurological: Positive for dizziness and numbness  Negative for headaches  Psychiatric/Behavioral: Negative for sleep disturbance  Video Exam    Vitals:    05/10/22 1600   Weight: 89 8 kg (198 lb)   Height: 5' 3" (1 6 m)       Physical Exam  HENT:      Head: Normocephalic  Nose: Nose normal    Eyes:      Conjunctiva/sclera: Conjunctivae normal    Pulmonary:      Effort: Pulmonary effort is normal    Musculoskeletal:      Cervical back: Normal range of motion  Neurological:      Mental Status: She is alert and oriented to person, place, and time     Psychiatric: Mood and Affect: Mood normal          Behavior: Behavior normal          Thought Content: Thought content normal          Judgment: Judgment normal           I spent 55 minutes with patient today in which greater than 50% of the time was spent in counseling/coordination of care regarding reviewing chart; GDM diagnosis; plan of care; etc     VIRTUAL VISIT DISCLAIMER      Lucila Sánchez verbally agrees to participate in [employer]  Pt is aware that [employer] could be limited without vital signs or the ability to perform a full hands-on physical exam  Naya Butler understands she or the provider may request at any time to terminate the video visit and request the patient to seek care or treatment in person

## 2022-05-10 NOTE — PROGRESS NOTES
Traci Tidwell 1990 female MRN: [id number]    Family Medicine Acute Visit    ASSESSMENT/PLAN  Problem List Items Addressed This Visit        Other    Chronic fatigue    Relevant Orders    Ambulatory Referral to Sleep Medicine    Current mild episode of major depressive disorder without prior episode ([address] ) - Primary     Depression Screening Follow-up Plan: Patient's depression screening was positive with a PHQ-2 score of   Their PHQ-9 score was   Patient assessed for underlying major depression  They have no active suicidal ideations  Brief counseling provided and recommend additional follow-up/re-evaluation next office visit  Stable on zoloft          Snoring    Relevant Orders    Ambulatory Referral to Sleep Medicine      Other Visit Diagnoses     Excessive daytime sleepiness        Relevant Orders    Ambulatory Referral to Sleep Medicine            Patient would like sleep medicine referral  Labs done in the past normal  No acute findings on exam      Future Appointments   Date Time Provider Lorenzo Cunha   5/10/2022  1:20 PM SARAHI [facility]   5/10/2022  3:30 PM [address]   2022  8:30 AM [address]   2022  1:00 PM [address]   2022  1:20 PM MD BRANDON Humphre[facility]   2022  4:45 PM  [address]   2022  1:20 PM MD BRANDON Reyna [facility]          SUBJECTIVE  CC: Follow-up (would like sleep test done-states she is always tired and has been snoring alot )      HPI:  Traci Tidwell is a 34 y o  female who presents for sleep concerns  She has had fatigue for many years  Reports she is snoring a lot lately  She is 14 weeks pregnant  She was diagnosed with gestational diabetes  She denies any URI symptoms, no allergies       Review of Systems   Constitutional: Positive for fatigue  Negative for chills and fever  HENT: Negative for congestion, postnasal drip, rhinorrhea and sinus pressure  Eyes: Negative for photophobia and visual disturbance  Respiratory: Negative for cough and shortness of breath  Cardiovascular: Negative for chest pain, palpitations and leg swelling  Gastrointestinal: Negative for abdominal pain, constipation, diarrhea, nausea and vomiting  Genitourinary: Negative for difficulty urinating and dysuria  Musculoskeletal: Negative for arthralgias and myalgias  Skin: Negative for color change and rash  Neurological: Negative for dizziness, weakness, light-headedness and headaches  Historical Information   The patient history was reviewed as follows:  Past Medical History:   Diagnosis Date    Chronic fatigue     Depression     currently on Zoloft 50 mg managed by PCP            Past Surgical History:   Procedure Laterality Date    TONSILECTOMY AND ADNOIDECTOMY      WISDOM TOOTH EXTRACTION  2019     Family History   Problem Relation Age of Onset    Cancer Maternal Grandfather     Lung cancer Maternal Grandfather     Thyroid disease Mother     Breast cancer Neg Hx     Ovarian cancer Neg Hx     Colon cancer Neg Hx     Down syndrome Neg Hx     Fragile X syndrome Neg Hx     Cystic fibrosis Neg Hx       Social History   Social History     Substance and Sexual Activity   Alcohol Use Not Currently    Alcohol/week: 2 0 standard drinks    Types: 2 Standard drinks or equivalent per week     Social History     Substance and Sexual Activity   Drug Use Never     Social History     Tobacco Use   Smoking Status Former Smoker    Packs/day: 0 50    Years: 10 00    Pack years: 5 00    Types: Cigarettes    Quit date: 5/6/2019    Years since quitting: 3 0   Smokeless Tobacco Never Used       Medications:     Current Outpatient Medications:     aspirin 81 mg chewable tablet, Chew 162 mg daily Stop at 36 weeks gestation, Disp: , Rfl:     Prenatal MV-Min-Fe Fum-FA-DHA (PRENATAL 1 PO), Take 1 tablet by mouth daily, Disp: , Rfl:     sertraline (ZOLOFT) 50 mg tablet, TAKE 1 TABLET BY MOUTH EVERY DAY, Disp: 30 tablet, Rfl: 0    [employer]S FIBER GUMMIES PO, Take by mouth (Patient not taking: Reported on 5/10/2022 ), Disp: , Rfl:     No Known Allergies    OBJECTIVE  Vitals:   Vitals:    05/10/22 0843   BP: 116/82   BP Location: Left arm   Patient Position: Sitting   Cuff Size: Large   Pulse: 102   Resp: 16   Temp: 97 7 °F (36 5 °C)   TempSrc: Tympanic   SpO2: 99%   Weight: 89 kg (196 lb 3 2 oz)   Height: 5' 3" (1 6 m)         Physical Exam  Constitutional:       Appearance: She is well-developed  HENT:      Head: Normocephalic and atraumatic  Right Ear: Tympanic membrane, ear canal and external ear normal       Left Ear: Tympanic membrane, ear canal and external ear normal       Nose: Nose normal    Eyes:      Extraocular Movements: Extraocular movements intact  Conjunctiva/sclera: Conjunctivae normal    Cardiovascular:      Rate and Rhythm: Normal rate and regular rhythm  Heart sounds: Normal heart sounds  Pulmonary:      Effort: Pulmonary effort is normal  No respiratory distress  Breath sounds: Normal breath sounds  No wheezing  Musculoskeletal:         General: No tenderness  Normal range of motion  Cervical back: Normal range of motion and neck supple  Skin:     General: Skin is warm and dry  Neurological:      Mental Status: She is alert and oriented to person, place, and time     Psychiatric:         Behavior: Behavior normal                     Jose Meraz DO    5/10/2022

## 2022-05-10 NOTE — ASSESSMENT & PLAN NOTE
-Check A1 and CMP  -A1c 5 6% or less   -Keep 3 day diet log to review with dietitian   -Follow up with dietitian    -Start self monitoring blood glucose fasting and 2 hours after start of each meal  Keep glucose log  Glucose goals: fasting 60-90 mg/dL, 140 mg/dL or less 1 hour post meals, and 120 mg/dL or less 2 hours post meal    -Report glucose readings weekly via [employer] every Tuesday   -Start GDM diet with 3 meals and 3 snacks including recommended combination of carb, protein and fat per meal/snack   -Please eat meal or snack every 2-3 5 hours while awake   -No more than 8 to 10 hours of fasting overnight   -Stay active if no restriction from your OB, walk up to 30 minutes a day  -Always have glucose available to treat hypoglycemia  Use 15:15 rule  Refer to hypoglycemia patient education sheet  Test blood sugar when experiencing signs and symptoms of hypoglycemia and prior to driving    -Continue prenatal vitamin and baby aspirin as recommended   -Stop baby aspirin at 36 weeks gestation    -Continue follow-up with your OB and MFM as recommended   -Stay in close contact with diabetes education team   -Insulin requirements during pregnancy; basal/bolus concept and Metformin discussed  -Very important to maintain tight glucose control during pregnancy to decrease risk factors including fetal macrosomia; birth injury; risk of ; polyhydramnios; pre-term labor; pre-eclampsia;  hypoglycemia; jaundice and stillbirth  -Via [employer] diabetes and pregnancy booklet; 1800/ calorie diet plan and hypoglycemia patient education           Lab Results   Component Value Date    HGBA1C 6 3 2021

## 2022-05-10 NOTE — PATIENT INSTRUCTIONS
-Check A1 and CMP  -A1c 5 6% or less   -Keep 3 day diet log to review with dietitian   -Follow up with dietitian    -Start self monitoring blood glucose fasting and 2 hours after start of each meal  Keep glucose log  Glucose goals: fasting 60-90 mg/dL, 140 mg/dL or less 1 hour post meals, and 120 mg/dL or less 2 hours post meal    -Report glucose readings weekly via [employer] every Tuesday   -Start GDM diet with 3 meals and 3 snacks including recommended combination of carb, protein and fat per meal/snack   -Please eat meal or snack every 2-3 5 hours while awake   -No more than 8 to 10 hours of fasting overnight   -Stay active if no restriction from your OB, walk up to 30 minutes a day  -Always have glucose available to treat hypoglycemia  Use 15:15 rule  Refer to hypoglycemia patient education sheet  Test blood sugar when experiencing signs and symptoms of hypoglycemia and prior to driving    -Continue prenatal vitamin and baby aspirin as recommended   -Stop baby aspirin at 36 weeks gestation    -Continue follow-up with your OB and MFM as recommended   -Stay in close contact with diabetes education team   -Insulin requirements during pregnancy; basal/bolus concept and Metformin discussed  -Very important to maintain tight glucose control during pregnancy to decrease risk factors including fetal macrosomia; birth injury; risk of ; polyhydramnios; pre-term labor; pre-eclampsia;  hypoglycemia; jaundice and stillbirth  -Via [employer] diabetes and pregnancy booklet; 1800/2000 calorie diet plan and hypoglycemia patient education

## 2022-05-10 NOTE — PATIENT INSTRUCTIONS
NUTRITION IN PREGNANCY  Good Nutrition is a VERY important part of having a healthy pregnancy and healthy baby  You should follow a healthy diet which include the following: * Vegetables (which are dark green and leafy): at least 2 servings each day   * Protein (meat, eggs, beans, nuts, peanut butter): 3-4 servings each day   * Breads/whole grains (bread, pasta, rice, tortillas, potatoes): 3 servings each day   * Dairy (milk, yogurt, cheese): 3-4 servings each day   * Water: 6-8 glasses per day   * Calories: approximately 2000 to 2200 calories per day     WEIGHT GAIN   Recommended weight gain for you during your pregnancy is based on your body mass index (BMI) at the time that you became pregnant  Pre-pregnant BMI Recommended weight gain   Underweight (BMI less than 18 5) 28 to 40 pounds   Normal weight (BMI 18 5-24 9) 25 to 35 pounds   Overweight (BMI 25-29 9) 15 to 25 pounds   Obese (BMI 30 or greater) 11 to 20 pounds     FOOD SAFETY   It is VERY important to eat only safely-prepared foods during pregnancy as you and your baby have a higher risk than usual for being affected by foodborne illnesses    Follow these steps to ensure that you and your baby are safe from foodborne illnesses while you are pregnant:   · wash hands thoroughly with warm water and soap before and after handling any foods   · wash cutting boards, dishes, utensils, and countertops with hot water and soap before and after preparing any foods   · rinse raw fruits and vegetables thoroughly under running water before eating   · keep raw meat and seafood separate from other foods and use different cutting boards/utensils to handle raw meat than for other foods   · put cooked food on a freshly clean plate   · cook all of your foods thoroughly   · discard foods that have been left out for more than 2 hours   · refrigerate or freeze any foods than can spoil     There are three particular foodborne risks that you should be aware of and avoid as they can cause serious harm to your unborn child  * Listeria (a harmful bacteria)   · dont eat hot dogs or deli meats (unless theyre reheated until steaming hot)   · dont eat soft cheeses (such as Feta, Brie, Camembert) unless they are specifically labeled as being made with pasteurized milk   · dont drink raw (unpasteurized) milk   · dont eat refrigerated pates or meat spreads   · dont eat refrigerated smoked seafood unless its in a cooked dish like a casserole     * Mercury (a metal which is found in certain fish in high levels)   · dont eat shark, tilefish, tom mackerel, or swordfish   · dont eat more than 12 ounces per week of shrimp, salmon, pollock, or catfish   · when eating tuna fish, you can have up to 6 ounces per week of canned albacore tuna OR up to 12 ounces of canned light tuna     * Toxoplasma (a harmful parasite)   · cook meat thoroughly before eating   · wear gloves when gardening or handling sand from a childs sandbox   · if you ha tve a cat, have someone else change the litter box while you are pregnant  · if you HAVE to clean it yourself, be sure to wash your hands thoroughly afterwards with warm water and soap     · dont get a NEW cat while you are pregnant

## 2022-05-10 NOTE — PROGRESS NOTES
Routine Prenatal Visit  [address] [facility], [address], [facility], [address] [facility]    Assessment/Plan:  Star Galicia is a 34y o  year old  at 14w3d who presents for routine prenatal visit  1  14 weeks gestation of pregnancy  -     POCT urine dip    2  Rh negative status during pregnancy in second trimester    3  Gestational diabetes mellitus (GDM) in second trimester, gestational diabetes method of control unspecified    4  Obesity affecting pregnancy in second trimester    5  Depression affecting pregnancy    6  Encounter for  screening  -     Alpha fetoprotein, maternal; Future; Expected date: 2022  -     Alpha fetoprotein, maternal      No movement   Had  Light  Spotting afteer relations  2 weeks ago none since  MSAFP  Order given w counseling    + has diabetic teaching today abnormal three hour      Subjective:     CC: Prenatal care    Para Roberto is a 34 y o  [place] female who presents for routine prenatal care at 14w3d  Pregnancy ROS: no  leakage of fluid, pelvic pain, or vaginal bleeding   +  fetal movement      The following portions of the patient's history were reviewed and updated as appropriate: allergies, current medications, past family history, past medical history, obstetric history, gynecologic history, past social history, past surgical history and problem list       Objective:  /74 (BP Location: Left arm, Patient Position: Sitting, Cuff Size: Standard)   Ht 5' 3" (1 6 m)   Wt 89 8 kg (198 lb)   LMP 01/15/2022   BMI 35 07 kg/m²   Pregravid Weight/BMI: 86 2 kg (190 lb) (BMI 33 67)  Current Weight: 89 8 kg (198 lb)   Total Weight Gain: 3 629 kg (8 lb)   Pre-Sofia Vitals      Most Recent Value   Prenatal Assessment    Prenatal Vitals    Blood Pressure 114/74   Weight - Scale 89 8 kg (198 lb)   Urine Albumin/Glucose    Dilation/Effacement/Station    Vaginal Drainage    Edema            General: Well appearing, no distress  Respiratory: Unlabored breathing  Cardiovascular: Regular rate  Abdomen: Soft, gravid, nontender  Fundal Height: Appropriate for gestational age  Extremities: Warm and well perfused  Non tender

## 2022-05-17 DIAGNOSIS — F32.0 CURRENT MILD EPISODE OF MAJOR DEPRESSIVE DISORDER WITHOUT PRIOR EPISODE (HCC): ICD-10-CM

## 2022-05-24 ENCOUNTER — TELEMEDICINE (OUTPATIENT)
Dept: PERINATAL CARE | Facility: CLINIC | Age: 32
End: 2022-05-24
Payer: COMMERCIAL

## 2022-05-24 ENCOUNTER — DOCUMENTATION (OUTPATIENT)
Dept: PERINATAL CARE | Facility: CLINIC | Age: 32
End: 2022-05-24

## 2022-05-24 DIAGNOSIS — O24.414 INSULIN CONTROLLED GESTATIONAL DIABETES MELLITUS (GDM) IN SECOND TRIMESTER: Primary | ICD-10-CM

## 2022-05-24 DIAGNOSIS — Z3A.16 16 WEEKS GESTATION OF PREGNANCY: ICD-10-CM

## 2022-05-24 DIAGNOSIS — R73.03 PREDIABETES: ICD-10-CM

## 2022-05-24 DIAGNOSIS — O99.212 OBESITY COMPLICATING PREGNANCY IN SECOND TRIMESTER: ICD-10-CM

## 2022-05-24 PROCEDURE — G0108 DIAB MANAGE TRN  PER INDIV: HCPCS | Performed by: DIETITIAN, REGISTERED

## 2022-05-24 RX ORDER — INSULIN GLARGINE 100 [IU]/ML
22 INJECTION, SOLUTION SUBCUTANEOUS
Qty: 15 ML | Refills: 0 | Status: SHIPPED | OUTPATIENT
Start: 2022-05-24 | End: 2022-06-20

## 2022-05-24 NOTE — PROGRESS NOTES
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 yes Pre-Diabetes  Family members with diabetes: Father and Grandfather  How many total pregnancies have you had? 1  How many children do you have? 0  Are you having swelling or fluid retention? no  Have you been placed on bedrest? no    Nutrition & Physical Activity:  Do you exercise? no  How many meals do you eat daily? 3  List times of meals: Breakfast: 7:30 AM/ Lunch: 12-12:30 PM/ Dinner: 6:30 PM  How many snacks do you eat daily? 3  List times of snacks: AM Snack: 10 AM/ PM Snack: 3 PM/ Bedtime Snack: 9:30 PM but struggles to stay awake for this snack  What type of diet are you following at home? Low Carbohydrate  Do you have special Amish or ethnic dietary preferences? yes Dislikes--Hot cereal, cottage cheese, avacado  Do you have any food allergies or intolerances? no  Do you receive WIC or food stamps? no    Learning preferences: How do you learn best? Reading, demonstration & Hands on    How do you rate your health? Ligia Meza  Who is your primary support person? Spouse  How do you cope with stress? Has lots of stress during day at her job  How do you feel about being pregnant with diabetes?  Not great

## 2022-05-24 NOTE — PROGRESS NOTES
Virtual Regular Visit    Verification of patient location: [place]    Patient is located in the following state in which I hold an active license [place]      Assessment/Plan:    Problem List Items Addressed This Visit        Endocrine    Gestational diabetes mellitus (GDM) in second trimester - Primary       Other    14 weeks gestation of pregnancy    Prediabetes      Other Visit Diagnoses     Obesity complicating pregnancy in second trimester                   Reason for visit is   Chief Complaint   Patient presents with    Gestational Diabetes    Patient Education    Virtual Regular Visit        Encounter provider Apolinar Salazar    Provider located at [address] [place] [address] [facility] [id number] [phone]      Recent Visits  No visits were found meeting these conditions  Showing recent visits within past 7 days and meeting all other requirements  Today's Visits  Date Type Provider Dept   05/24/22 Telemedicine Merlyn PORTILLO [facility]   Showing today's visits and meeting all other requirements  Future Appointments  No visits were found meeting these conditions  Showing future appointments within next 150 days and meeting all other requirements       The patient was identified by name and date of birth  Ewa Dumont was informed that this is a telemedicine visit and that the visit is being conducted through [facility] and patient was informed this is a secure, HIPAA-complaint platform  She agrees to proceed     My office door was closed  No one else was in the room  She acknowledged consent and understanding of privacy and security of the video platform  The patient has agreed to participate and understands they can discontinue the visit at any time  Patient is aware this is a billable service  Subjective  Ewa Dumont is a 34 y o  female pregnant patient        HPI     Past Medical History:   Diagnosis Date    Chronic fatigue     Depression     currently on Zoloft 50 mg managed by PCP  Past Surgical History:   Procedure Laterality Date    TONSILECTOMY AND ADNOIDECTOMY      WISDOM TOOTH EXTRACTION  2019       Current Outpatient Medications   Medication Sig Dispense Refill    Accu-Chek Guide test strip Test 4 times a day and as instructed  GDM  100 each 7    Accu-Chek Softclix Lancets lancets Use 4 a day  GDM  100 each 7    aspirin 81 mg chewable tablet Chew 162 mg daily Stop at 36 weeks gestation      Blood Glucose Monitoring Suppl (Accu-Chek Guide Me) w/Device KIT Dispense 1 kit  GDM  1 kit 0    [employer] FIBER GUMMIES PO Take by mouth      Prenatal MV-Min-Fe Fum-FA-DHA (PRENATAL 1 PO) Take 1 tablet by mouth daily      sertraline (ZOLOFT) 50 mg tablet TAKE 1 TABLET BY MOUTH EVERY DAY 30 tablet 0     No current facility-administered medications for this visit  No Known Allergies    Review of Systems    Video Exam    There were no vitals filed for this visit  Physical Exam --not performed  I spent 60 minutes directly with the patient during this visit  VIRTUAL VISIT DISCLAIMER      Gabriela Yang verbally agrees to participate in [employer]  Pt is aware that [employer] could be limited without vital signs or the ability to perform a full hands-on physical exam  Naya Rivero understands she or the provider may request at any time to terminate the video visit and request the patient to seek care or treatment in person  Thank you for referring your patient to [facility] Maternal Fetal Medicine Diabetes in Pregnancy Program      Gabriela Yang is a  34 y o  female who presents today for Individual  DSMT Follow up  Patient is at 16w3d gestation, Estimated Date of Delivery: 11/5/22  Reviewed and updated the following from patients medical record: PMH, Problem List, Allergies, and Current Medications      Visit Diagnosis:  Insulin controlled GDM    Discussed with patient pathophysiology of GDM, untreated hyperglycemia in pregnancy and maternal fetal complications including fetal macrosomia,  hypoglycemia, polyhydramnios, increased incidence of  section,  labor, and in severe cases fetal demise and still birth   Discussed importance of blood glucose monitoring, nutrition, and medication if necessary in achieving BG goals  Additional Pregnancy Complications:  Obesity and Pre-Diabetes    Labs:    Lab Results   Component Value Date    [id number] 149 (H) 2022       Lab Results   Component Value Date    GLUF 123 (H) 2022    [id number] 120 2022        Glucose 1 hour--204       Glucose 2 hour--193    21 UcJ0f-5 3%; reported she did her blood work at [facility] this morning & results are still pending  Medications:  Basaglar Begin 22 units Basaglar at 9-10 PM    Anthropometrics:  Ht Readings from Last 3 Encounters:   05/10/22 5' 3" (1 6 m)   05/10/22 5' 3" (1 6 m)   05/10/22 5' 3" (1 6 m)     Wt Readings from Last 3 Encounters:   05/10/22 89 8 kg (198 lb)   05/10/22 89 8 kg (198 lb)   05/10/22 89 kg (196 lb 3 2 oz)     Pre-gravid weight: 86 2 kg (190 lb)  Pre-gravid BMI: 33 67  Weight Change: 3 629 kg (8 lb)  Weight gain recommendations: BMI (> 30) 11-20 lbs  Comments: Pateint may gain 12 more pounds for the remainder of the pregnancy    Recent Ultra Sound Results:  Date: 22  Fetal Growth: Normal  HUMBERTO: Normal  Next  date: 22    Blood Glucose Monitoring:   Glucose Meter: Accu-chek Guide Me  Instructed on testing blood sugars: 4 x per day (Fasting, 2 hour after start of each meal)    Gave instruction on site selection, skin preparation, loading strips and lancet device, meter activation, obtaining blood sample, test strip and lancet disposal and storage, and recording log book entries  Patient has good understanding of material covered and was able to test their own blood sugar in office today       Instruction for reporting blood sugar results weekly via:  Phone: [phone]   OR  My Chart (Message with image attachment, or Glucose Flowsheet)    Goal Blood Sugar Ranges:   Fastin-90 mg/dL  1 hour after the start of each meal: 140 mg/dL or less  2 hours after start of each meal: 120 mg/dL or less    Blood Glucose Log:      Insulin Education:    Basaglar Begin 22 units Basagalr at 9-10 PM    The patient was instructed on the following:   Insulin administration times, insulin action  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, 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  Discussed how to apply for a co-pay assistance card   Hypoglycemia signs, symptoms and treatment  Advised patient to test blood sugar at 3:00 am for first 3 mornings following insulin start to monitor for hypoglycemia   Increase in maternal-fetal surveillance with insulin initiation   Side effects of hyperglycemia in pregnancy including macrosomia,  hypoglycemia, polyhydramnios, pre-term labor and stillbirth   Continue to monitor blood glucoses via fingerstick fasting (goal 60 mg/dl to 90 mg/dl) and two hours post prandial (goal less than 120 mg/dl)   Non-stress testing two times weekly and HUMBERTO testing beginning at 32 weeks gestation   Ultrasounds every 4 weeks at the [facility]   HbA1c every 6 to 8 weeks      Meal Plan (daily calorie and protein needs):  Calories: 1800 calorie (CHO: 25-51-87-83-20-84) (PRO:2-1-3-1-3-2) for ist trimester & begining of 2nd trimester  To increase to 2100 calories for 2nd/3rd trimesters when appetite increases  Diet Recall:  Provided at this appointment    Type of Diet:Low Carbohydrate  Additional Nutrition Concerns: Dislikes Hot cereal, cottage cheese, & avocado  Diet recall  indicates she is following the meal plan closely; is eating 3 meals & 3 snacks   Prefers a breakfast sandwich at breakfast   Stressed the importance of only eating 3-4 oz protein at both lunch & dinner; last nights dinner was 6 oz sausage  Meal Plan Tips:  1  Patient was provided with a meal plan including 3 meals and 3 snacks  2  Discussed appropriate amounts of CHO, PRO, and Fat at each meal and snack  3  Reviewed CHO exchange list, and portion sizes for both CHO and PRO via food models  4  Instruction on how to read a food label  5  Provided suggested meal/snack options to increase nutrition and maintain consistent meal and snack intakes  6  Instructed on how to keep a 3-day food diary to be brought to follow- up appointment  7  Encouraged  patient to eat every 2 0-3 5 hours while awake  8  Encouraged patient to go no longer than 8-10 hours fasting overnight until first meal of the day  Physical Activity:  Discussed benefits of physical activity to optimize blood glucose control, encouraged activity at patient is physically able  Always consult a physician prior to starting an exercise program  Recommend 20-30 minutes daily  Patient Stated Goal: "I will plan my meals and snacks every day"    Diabetes Self Management Support Plan outside of ongoing care: Spouse/Family    Learner/s Present:Learners Present: Patient   Barriers to Learning/Change: No Barriers  Expected Compliance: good    Date to report blood sugars: Weekly  Follow-up scheduled: 6/28/22 with SARAHI Hernandez CDE  Desires to wait a while before scheduling a follow-up nutrition appointment  Begin Time: 3 PM  End Time: 3:57 PM    It was a pleasure working with them today  Please feel free to call with any questions or concerns      Antonino Menjivar  Diabetes Educator  [facility] Maternal Fetal [facility]  Diabetes in Pregnancy Program  [address] [place] [address],[address]  [address]

## 2022-05-25 LAB
ALBUMIN SERPL-MCNC: 3.4 G/DL (ref 3.8–4.8)
ALBUMIN/GLOB SERPL: 1.4 {RATIO} (ref 1.2–2.2)
ALP SERPL-CCNC: 80 IU/L (ref 44–121)
ALT SERPL-CCNC: 9 IU/L (ref 0–32)
AST SERPL-CCNC: 11 IU/L (ref 0–40)
BILIRUB SERPL-MCNC: 0.2 MG/DL (ref 0–1.2)
BUN SERPL-MCNC: 7 MG/DL (ref 6–20)
BUN/CREAT SERPL: 15 (ref 9–23)
CALCIUM SERPL-MCNC: 8.9 MG/DL (ref 8.7–10.2)
CHLORIDE SERPL-SCNC: 103 MMOL/L (ref 96–106)
CO2 SERPL-SCNC: 19 MMOL/L (ref 20–29)
CREAT SERPL-MCNC: 0.47 MG/DL (ref 0.57–1)
EGFR: 129 ML/MIN/1.73
EST. AVERAGE GLUCOSE BLD GHB EST-MCNC: 140 MG/DL
GLOBULIN SER-MCNC: 2.5 G/DL (ref 1.5–4.5)
GLUCOSE SERPL-MCNC: 113 MG/DL (ref 65–99)
HBA1C MFR BLD: 6.5 % (ref 4.8–5.6)
POTASSIUM SERPL-SCNC: 4.4 MMOL/L (ref 3.5–5.2)
PROT SERPL-MCNC: 5.9 G/DL (ref 6–8.5)
SODIUM SERPL-SCNC: 138 MMOL/L (ref 134–144)
TSH SERPL DL<=0.005 MIU/L-ACNC: 0.62 UIU/ML (ref 0.45–4.5)

## 2022-05-26 LAB
2ND TRIMESTER 4 SCREEN SERPL-IMP: NORMAL
AFP ADJ MOM SERPL: 1.2
AFP INTERP AMN-IMP: NORMAL
AFP INTERP SERPL-IMP: NORMAL
AFP INTERP SERPL-IMP: NORMAL
AFP SERPL-MCNC: 48.5 NG/ML
AGE AT DELIVERY: 33.5 YR
GA METHOD: NORMAL
GA: 18.4 WEEKS
IDDM PATIENT QL: NO
MULTIPLE PREGNANCY: NO
NEURAL TUBE DEFECT RISK FETUS: 6499 %

## 2022-05-31 ENCOUNTER — DOCUMENTATION (OUTPATIENT)
Dept: PERINATAL CARE | Facility: CLINIC | Age: 32
End: 2022-05-31

## 2022-05-31 NOTE — PROGRESS NOTES
Date: 05/31/22  Jessi Caal  1990  Estimated Date of Delivery: 11/5/22  17w3d  OB/GYN: Yeny ZHANG  GDM in pregnancy method of control unspecified  Additional Pregnancy Complications: Obesity, History of prediabetes with A1c 6 3%        Plan:  Increase Basaglar from 22 up to 26 units at 9 or 10 pm daily  - initiated 05/26/2022  Encourage to add units of insulin to flow sheet - add to dinner column     Diet: : 1800 calorie (CHO: 99-60-15-62-89-41) (PRO:2-1-3-1-3-2)  Gestational diabetes meal plan; 3 meals and 3 snacks  To increase to 2100 calories for 2nd/3rd trimesters when appetite increases  Testing blood sugars: 4 x per day (Fasting, 2 hour after start of each meal)  Meter: Accu-chek Guide Me  Activity: Walks 30 minutes daily, follow OB recommendations  Support System: Significant Other / family   Patient Goal: "I will plan my meals and snacks every day"  Education:  Class 2 completed with Ronny Wood on 05/24/2022    CRNP:  05/10/2022  NEXT 06/28/2022    Weight Change:    Pre-gravid weight: 86 2 kg (190 lb)  3 629 kg (8 lb)  Weight gain recommendations: BMI (> 30) 11-20 lbs    Ultrasounds  04/25/2022 Nuchal translucency  Next US scheduled for 06/27/2022    Labs  Lab Results   Component Value Date    [id number] 149 (H) 04/05/2022     Lab Results   Component Value Date    GLUF 123 (H) 05/03/2022    EAEDUWP6GN 120 05/03/2022     Lab Results   Component Value Date    HGBA1C 6 5 (H) 05/24/2022       Further fetal surveillance  Beginning at 32 weeks, NST / HUMBERTO twice a week    Larry Jaeger RN

## 2022-06-07 ENCOUNTER — ROUTINE PRENATAL (OUTPATIENT)
Dept: OBGYN CLINIC | Facility: CLINIC | Age: 32
End: 2022-06-07

## 2022-06-07 VITALS
WEIGHT: 195.8 LBS | DIASTOLIC BLOOD PRESSURE: 64 MMHG | BODY MASS INDEX: 34.69 KG/M2 | SYSTOLIC BLOOD PRESSURE: 100 MMHG | HEIGHT: 63 IN

## 2022-06-07 DIAGNOSIS — Z3A.18 18 WEEKS GESTATION OF PREGNANCY: ICD-10-CM

## 2022-06-07 DIAGNOSIS — O24.414 INSULIN CONTROLLED GESTATIONAL DIABETES MELLITUS (GDM) IN SECOND TRIMESTER: Primary | ICD-10-CM

## 2022-06-07 DIAGNOSIS — E66.01 MATERNAL MORBID OBESITY, ANTEPARTUM, SECOND TRIMESTER (HCC): ICD-10-CM

## 2022-06-07 DIAGNOSIS — O99.212 MATERNAL MORBID OBESITY, ANTEPARTUM, SECOND TRIMESTER (HCC): ICD-10-CM

## 2022-06-07 DIAGNOSIS — F32.A DEPRESSION AFFECTING PREGNANCY: ICD-10-CM

## 2022-06-07 DIAGNOSIS — O99.340 DEPRESSION AFFECTING PREGNANCY: ICD-10-CM

## 2022-06-07 LAB
SL AMB  POCT GLUCOSE, UA: NEGATIVE
SL AMB POCT URINE PROTEIN: NEGATIVE

## 2022-06-07 PROCEDURE — PNV: Performed by: OBSTETRICS & GYNECOLOGY

## 2022-06-10 NOTE — ASSESSMENT & PLAN NOTE
Feels as though she has a good understanding of her Dx  Trying to stick with diet   Anatomy U/S scheduled  Lab Results   Component Value Date    HGBA1C 6 5 (H) 05/24/2022

## 2022-06-10 NOTE — PROGRESS NOTES
Routine Prenatal Visit  [address], [address], [facility], [address] [facility]    Assessment/Plan:  Nathan Simms is a 34y o  year old  at 18w3d who presents for routine prenatal visit  1  Insulin controlled gestational diabetes mellitus (GDM) in second trimester  Assessment & Plan:  Feels as though she has a good understanding of her Dx  Trying to stick with diet  Anatomy U/S scheduled  Lab Results   Component Value Date    HGBA1C 6 5 (H) 2022         2  Maternal morbid obesity, antepartum, second trimester ([address] )    3  Depression affecting pregnancy    4  18 weeks gestation of pregnancy  -     POCT urine dip        Subjective:     CC: Prenatal care    Saintclair Davidson is a 34 y o  Anthonette Rucks female who presents for routine prenatal care at 18w3d  Pregnancy ROS: no leakage of fluid, pelvic pain, or vaginal bleeding  no fetal movement  The following portions of the patient's history were reviewed and updated as appropriate: allergies, current medications, past family history, past medical history, obstetric history, gynecologic history, past social history, past surgical history and problem list       Objective:  /64   Ht 5' 3" (1 6 m)   Wt 88 8 kg (195 lb 12 8 oz)   LMP 01/15/2022   BMI 34 68 kg/m²   Pregravid Weight/BMI: 86 2 kg (190 lb) (BMI 33 67)  Current Weight: 88 8 kg (195 lb 12 8 oz)   Total Weight Gain: 2 631 kg (5 lb 12 8 oz)   Pre- Vitals    Flowsheet Row Most Recent Value   Prenatal Assessment    Fetal Heart Rate 150   Fundal Height (cm) 20 cm   Movement Absent   Prenatal Vitals    Blood Pressure 100/64   Weight - Scale 88 8 kg (195 lb 12 8 oz)   Urine Albumin/Glucose    Dilation/Effacement/Station    Vaginal Drainage    Edema    LLE Edema None   RLE Edema None   Facial Edema None           General: Well appearing, no distress  Respiratory: Unlabored breathing  Cardiovascular: Regular rate    Abdomen: Soft, gravid, nontender  Fundal Height: Appropriate for gestational age  Extremities: Warm and well perfused  Non tender

## 2022-06-16 DIAGNOSIS — F32.0 CURRENT MILD EPISODE OF MAJOR DEPRESSIVE DISORDER WITHOUT PRIOR EPISODE (HCC): ICD-10-CM

## 2022-06-26 NOTE — PROGRESS NOTES
Please refer to the [facility] ultrasound report in Ob Procedures for additional information regarding today's visit

## 2022-06-27 ENCOUNTER — ROUTINE PRENATAL (OUTPATIENT)
Dept: PERINATAL CARE | Facility: OTHER | Age: 32
End: 2022-06-27
Payer: COMMERCIAL

## 2022-06-27 VITALS
WEIGHT: 200.6 LBS | SYSTOLIC BLOOD PRESSURE: 122 MMHG | HEIGHT: 63 IN | DIASTOLIC BLOOD PRESSURE: 63 MMHG | BODY MASS INDEX: 35.54 KG/M2 | HEART RATE: 80 BPM

## 2022-06-27 DIAGNOSIS — Z36.86 ENCOUNTER FOR ANTENATAL SCREENING FOR CERVICAL LENGTH: ICD-10-CM

## 2022-06-27 DIAGNOSIS — O99.212 MATERNAL OBESITY, ANTEPARTUM, SECOND TRIMESTER: ICD-10-CM

## 2022-06-27 DIAGNOSIS — Z3A.21 21 WEEKS GESTATION OF PREGNANCY: ICD-10-CM

## 2022-06-27 DIAGNOSIS — O24.112 PREGNANCY COMPLICATED BY PRE-EXISTING TYPE 2 DIABETES, SECOND TRIMESTER: Primary | ICD-10-CM

## 2022-06-27 PROCEDURE — 76817 TRANSVAGINAL US OBSTETRIC: CPT | Performed by: OBSTETRICS & GYNECOLOGY

## 2022-06-27 PROCEDURE — 76811 OB US DETAILED SNGL FETUS: CPT | Performed by: OBSTETRICS & GYNECOLOGY

## 2022-06-27 PROCEDURE — 99214 OFFICE O/P EST MOD 30 MIN: CPT | Performed by: OBSTETRICS & GYNECOLOGY

## 2022-06-27 NOTE — LETTER
June 29, 2022     MD Fouzia Hutchins [address]  [address] [address]  [place] [id number]    Patient: Mattie Ramirez   YOB: 1990   Date of Visit: 6/27/2022       Dear Dr Detra Lanes: Thank you for referring Mattie Ramirez to me for evaluation  Below are my notes for this consultation  If you have questions, please do not hesitate to call me  I look forward to following your patient along with you  Sincerely,        Richmond Zaragoza MD        CC: No Recipients  Richmond Zaragoza MD  6/26/2022  9:41 AM  Sign when Signing Visit  Please refer to the [facility] ultrasound report in Ob Procedures for additional information regarding today's visit

## 2022-06-27 NOTE — PROGRESS NOTES
Ultrasound Probe Disinfection    A transvaginal ultrasound was performed  Prior to use, disinfection was performed with High Level Disinfection Process (Trophon)  Probe serial number U2: [id number] was used        Patricio Camara  06/27/22  4:22 PM

## 2022-06-28 ENCOUNTER — TELEMEDICINE (OUTPATIENT)
Dept: PERINATAL CARE | Facility: CLINIC | Age: 32
End: 2022-06-28
Payer: COMMERCIAL

## 2022-06-28 VITALS — BODY MASS INDEX: 35.44 KG/M2 | WEIGHT: 200 LBS | HEIGHT: 63 IN

## 2022-06-28 DIAGNOSIS — O99.810 HYPERGLYCEMIA IN PREGNANCY: ICD-10-CM

## 2022-06-28 DIAGNOSIS — Z3A.21 21 WEEKS GESTATION OF PREGNANCY: ICD-10-CM

## 2022-06-28 DIAGNOSIS — E66.01 MATERNAL MORBID OBESITY IN SECOND TRIMESTER, ANTEPARTUM (HCC): ICD-10-CM

## 2022-06-28 DIAGNOSIS — O99.212 MATERNAL MORBID OBESITY IN SECOND TRIMESTER, ANTEPARTUM (HCC): ICD-10-CM

## 2022-06-28 DIAGNOSIS — O24.419 GESTATIONAL DIABETES MELLITUS (GDM) IN SECOND TRIMESTER, GESTATIONAL DIABETES METHOD OF CONTROL UNSPECIFIED: Primary | ICD-10-CM

## 2022-06-28 PROBLEM — R76.8 POSITIVE ANA (ANTINUCLEAR ANTIBODY): Status: RESOLVED | Noted: 2021-08-13 | Resolved: 2022-06-28

## 2022-06-28 PROCEDURE — 99443 PR PHYS/QHP TELEPHONE EVALUATION 21-30 MIN: CPT | Performed by: NURSE PRACTITIONER

## 2022-06-28 PROCEDURE — 3008F BODY MASS INDEX DOCD: CPT | Performed by: FAMILY MEDICINE

## 2022-06-28 RX ORDER — INSULIN ASPART 100 [IU]/ML
10 INJECTION, SOLUTION INTRAVENOUS; SUBCUTANEOUS
Qty: 15 ML | Refills: 0 | Status: SHIPPED | OUTPATIENT
Start: 2022-06-28 | End: 2022-07-25

## 2022-06-28 NOTE — ASSESSMENT & PLAN NOTE
-Starting weight 190 lbs  -Current weight 200 lbs  -TWG 10 lbs  -Recommended weight gain 10 lbs  -Continue GDM  -If no restrictions from OB, walk up to 30 minutes a day

## 2022-06-28 NOTE — PROGRESS NOTES
Virtual Regular Visit    Verification of patient location:[place]    Patient is located in the following state in which I hold an active license [place]      Assessment/Plan:    Problem List Items Addressed This Visit        Endocrine    Gestational diabetes mellitus (GDM) in second trimester - Primary     -A1c 6 5% not at goal; aim for 5 6% or less  -Repeat A1c, CMP and urine protein creatinine ratio in 4 to 6 weeks    -Continue basaglar 40 units split dose daily   -Start Metformin 500 mg with dinner  To be titrated weekly as tolerated up to total 2000 mg a day  -Due to 2 hours post meal readings above 120; start Novolog 4 units before lunch and 4 units before dinner  Use 10 units before lunch and before dinner when eating out    -Continue GDM diet 3 meals and 3 snacks a day  -No more than 8 to 10 hours of fasting overnight   -Try Boost glucose control with 16 grams of carbohydrates and 16 grams of protein if unable to eat bedtime snack   -Continue SMBG fasting and 2 hours post start of each meal   -Glucose goals fasting 60-90; 1 hour post meal 140 or less; 2 hours post meal 120 or less; before meals/overnight    -Send a message twice a week for readings to be reviewed until euglycemia    -Always have glucose available to treat hypoglycemia; use 15 by 15 rule  -If no restrictions from OB, try to walk 15 minutes a day and increase gradually to 30 minutes a day  -Please research insulin pump and CGM  Let me know if interested  -Fetal echo as scheduled  -Fetal growth ultrasound every 4 to 6 weeks as recommended   -Continue follow up with OB and MFM as recommended    -Stay in close contact with diabetes team    Lab Results   Component Value Date    HGBA1C 6 5 (H) 05/24/2022              Relevant Medications    insulin aspart (NovoLOG FlexPen) 100 UNIT/ML injection pen    metFORMIN (GLUCOPHAGE) 500 mg tablet    Hyperglycemia in pregnancy     -Due to 2 hours post meal, adding bolus insulin to regimen  Relevant Medications    insulin aspart (NovoLOG FlexPen) 100 UNIT/ML injection pen    metFORMIN (GLUCOPHAGE) 500 mg tablet       Other    BMI 35 0-35 9,adult    Relevant Medications    insulin aspart (NovoLOG FlexPen) 100 UNIT/ML injection pen    metFORMIN (GLUCOPHAGE) 500 mg tablet    Maternal morbid obesity in second trimester, antepartum ([facility] [address] )     -Starting weight 190 lbs  -Current weight 200 lbs  -TWG 10 lbs  -Recommended weight gain 10 lbs  -Continue GDM  -If no restrictions from OB, walk up to 30 minutes a day  Relevant Medications    insulin aspart (NovoLOG FlexPen) 100 UNIT/ML injection pen    metFORMIN (GLUCOPHAGE) 500 mg tablet    21 weeks gestation of pregnancy    Relevant Medications    insulin aspart (NovoLOG FlexPen) 100 UNIT/ML injection pen    metFORMIN (GLUCOPHAGE) 500 mg tablet        Discussed Metformin; made aware it does cross placenta and long term data not available  Metformin is used during pregnancy to improve insulin resistance and decrease glucose coming from liver  Reason for visit is   Chief Complaint   Patient presents with    Virtual Regular Visit    Gestational Diabetes        Encounter provider Venkat Quiros [facility]    Provider located at [address] [place] [address] [phone]      Recent Visits  No visits were found meeting these conditions  Showing recent visits within past 7 days and meeting all other requirements  Today's Visits  Date Type Provider Dept   06/28/22 [id number] [facility] today's visits and meeting all other requirements  Future Appointments  No visits were found meeting these conditions  Showing future appointments within next 150 days and meeting all other requirements       The patient was identified by name and date of birth   Mattie Ramirez was informed that this is a telemedicine visit and that the visit is being conducted through Telephone  My office door was closed  No one else was in the room  She acknowledged consent and understanding of privacy and security of the video platform  The patient has agreed to participate and understands they can discontinue the visit at any time  Virtual connection issues on my behalf; agreed to complete visit via telephone  Patient is aware this is a billable service  Subjective  Zachary Carpenter is a 34 y o  female  21 3/7 weeks gestation GDM on basaglar 40 units daily  Elevated A1c may be pregestational diabetes  Trying to eat 3 meals and 3 snacks but will eat of meal plan when eating out  At times has difficulties with bedtime snack  Testing 4 times a day, reporting via flowsheet  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  Reports ultrasound done yesterday within normal  Has fetal echo scheduled  HPI     Past Medical History:   Diagnosis Date    Chronic fatigue     Depression     currently on Zoloft 50 mg managed by PCP  Past Surgical History:   Procedure Laterality Date    TONSILECTOMY AND ADNOIDECTOMY      WISDOM TOOTH EXTRACTION         Current Outpatient Medications   Medication Sig Dispense Refill    insulin aspart (NovoLOG FlexPen) 100 UNIT/ML injection pen Inject 10 Units under the skin 2 (two) times a day before lunch and dinner To be titrated as directed  GDM  15 mL 0    metFORMIN (GLUCOPHAGE) 500 mg tablet Take 1 tablet (500 mg total) by mouth 2 (two) times a day with meals 60 tablet 1    Accu-Chek Guide test strip Test 4 times a day and as instructed  GDM  100 each 7    Accu-Chek Softclix Lancets lancets Use 4 a day  GDM  100 each 7    aspirin 81 mg chewable tablet Chew 162 mg daily Stop at 36 weeks gestation     [employer] KwikPen 100 units/mL injection pen Inject 36 Units under the skin daily at bedtime At 9 PM daily; titrate as directed   (Patient taking differently: Inject 40 Units under the skin daily at bedtime At 9 PM daily; titrate as directed ) 15 mL 0    Blood Glucose Monitoring Suppl (Accu-Chek Guide Me) w/Device KIT Dispense 1 kit  GDM  1 kit 0    Insulin Pen Needle 31G X 5 MM MISC Use 1 daily 100 each 3    Magnesium 100 MG TABS       Prenatal MV-Min-Fe Fum-FA-DHA (PRENATAL 1 PO) Take 1 tablet by mouth daily      sertraline (ZOLOFT) 50 mg tablet TAKE 1 TABLET BY MOUTH EVERY DAY 90 tablet 1     No current facility-administered medications for this visit  No Known Allergies    Review of Systems   Constitutional: Positive for fatigue  Negative for fever  Eyes: Negative for visual disturbance  Respiratory: Negative for cough and shortness of breath  Cardiovascular: Negative for chest pain and palpitations  Gastrointestinal: Positive for constipation (intermittently)  Negative for nausea and vomiting  Endocrine: Positive for polyuria  Negative for polydipsia and polyphagia  Genitourinary: Negative for difficulty urinating and vaginal bleeding  Neurological: Positive for numbness  Negative for dizziness and headaches  Psychiatric/Behavioral: Negative for sleep disturbance  Video Exam  Refer to glucose flowsheet  Vitals:    06/28/22 1029   Weight: 90 7 kg (200 lb)   Height: 5' 3" (1 6 m)       Physical Exam   It was my intent to perform this visit via video technology but the patient was not able to do a video connection so the visit was completed via audio telephone only  I spent 40 minutes directly with the patient during this visit  VIRTUAL VISIT DISCLAIMER      Jessi Caal verbally agrees to participate in [employer]  Pt is aware that [employer] could be limited without vital signs or the ability to perform a full hands-on physical exam  Naya Arnett understands she or the provider may request at any time to terminate the video visit and request the patient to seek care or treatment in person

## 2022-06-28 NOTE — PATIENT INSTRUCTIONS
-A1c 6 5% not at goal; aim for 5 6% or less  -Repeat A1c, CMP and urine protein creatinine ratio in 4 to 6 weeks    -Continue basaglar 40 units split dose daily   -Start Metformin 500 mg with dinner  To be titrated weekly as tolerated up to total 2000 mg a day  -Due to 2 hours post meal readings above 120; start Novolog 4 units before lunch and 4 units before dinner  Use 10 units before lunch and before dinner when eating out    -Continue GDM diet 3 meals and 3 snacks a day  -No more than 8 to 10 hours of fasting overnight   -Try Boost glucose control with 16 grams of carbohydrates and 16 grams of protein if unable to eat bedtime snack   -Continue SMBG fasting and 2 hours post start of each meal   -Glucose goals fasting 60-90; 1 hour post meal 140 or less; 2 hours post meal 120 or less; before meals/overnight    -Send a message twice a week for readings to be reviewed until euglycemia    -Always have glucose available to treat hypoglycemia; use 15 by 15 rule  -If no restrictions from OB, try to walk 15 minutes a day and increase gradually to 30 minutes a day  -Please research insulin pump and CGM  Let me know if interested  -Fetal echo as scheduled    -Fetal growth ultrasound every 4 to 6 weeks as recommended   -Continue follow up with OB and MFM as recommended    -Stay in close contact with diabetes team

## 2022-06-28 NOTE — ASSESSMENT & PLAN NOTE
-A1c 6 5% not at goal; aim for 5 6% or less  -Repeat A1c, CMP and urine protein creatinine ratio in 4 to 6 weeks    -Continue basaglar 40 units split dose daily   -Start Metformin 500 mg with dinner  To be titrated weekly as tolerated up to total 2000 mg a day  -Due to 2 hours post meal readings above 120; start Novolog 4 units before lunch and 4 units before dinner  Use 10 units before lunch and before dinner when eating out    -Continue GDM diet 3 meals and 3 snacks a day  -No more than 8 to 10 hours of fasting overnight   -Try Boost glucose control with 16 grams of carbohydrates and 16 grams of protein if unable to eat bedtime snack   -Continue SMBG fasting and 2 hours post start of each meal   -Glucose goals fasting 60-90; 1 hour post meal 140 or less; 2 hours post meal 120 or less; before meals/overnight    -Send a message twice a week for readings to be reviewed until euglycemia    -Always have glucose available to treat hypoglycemia; use 15 by 15 rule  -If no restrictions from OB, try to walk 15 minutes a day and increase gradually to 30 minutes a day  -Please research insulin pump and CGM  Let me know if interested  -Fetal echo as scheduled    -Fetal growth ultrasound every 4 to 6 weeks as recommended   -Continue follow up with OB and MFM as recommended    -Stay in close contact with diabetes team    Lab Results   Component Value Date    HGBA1C 6 5 (H) 05/24/2022

## 2022-06-29 PROBLEM — O24.112 PREGNANCY COMPLICATED BY PRE-EXISTING TYPE 2 DIABETES, SECOND TRIMESTER: Status: ACTIVE | Noted: 2022-06-29

## 2022-06-29 PROBLEM — O99.212 MATERNAL OBESITY, ANTEPARTUM, SECOND TRIMESTER: Status: ACTIVE | Noted: 2022-06-29

## 2022-07-05 ENCOUNTER — ROUTINE PRENATAL (OUTPATIENT)
Dept: OBGYN CLINIC | Facility: CLINIC | Age: 32
End: 2022-07-05

## 2022-07-05 VITALS
SYSTOLIC BLOOD PRESSURE: 102 MMHG | HEIGHT: 63 IN | DIASTOLIC BLOOD PRESSURE: 64 MMHG | BODY MASS INDEX: 35.61 KG/M2 | WEIGHT: 201 LBS

## 2022-07-05 DIAGNOSIS — O99.340 DEPRESSION AFFECTING PREGNANCY: ICD-10-CM

## 2022-07-05 DIAGNOSIS — F32.A DEPRESSION AFFECTING PREGNANCY: ICD-10-CM

## 2022-07-05 DIAGNOSIS — Z3A.22 22 WEEKS GESTATION OF PREGNANCY: ICD-10-CM

## 2022-07-05 DIAGNOSIS — O24.112 PREGNANCY COMPLICATED BY PRE-EXISTING TYPE 2 DIABETES, SECOND TRIMESTER: Primary | ICD-10-CM

## 2022-07-05 DIAGNOSIS — E66.01 MATERNAL MORBID OBESITY, ANTEPARTUM, SECOND TRIMESTER (HCC): ICD-10-CM

## 2022-07-05 DIAGNOSIS — Z36.89 ENCOUNTER FOR OTHER SPECIFIED ANTENATAL SCREENING: ICD-10-CM

## 2022-07-05 DIAGNOSIS — O99.212 MATERNAL MORBID OBESITY, ANTEPARTUM, SECOND TRIMESTER (HCC): ICD-10-CM

## 2022-07-05 LAB
SL AMB  POCT GLUCOSE, UA: NEGATIVE
SL AMB POCT URINE PROTEIN: NEGATIVE

## 2022-07-05 PROCEDURE — PNV: Performed by: OBSTETRICS & GYNECOLOGY

## 2022-07-05 NOTE — ASSESSMENT & PLAN NOTE
Lab Results   Component Value Date    HGBA1C 6 5 (H) 05/24/2022     Early 1hr and 3hr GTT elevated in pregnancy  5/24/2022 HgA1C 6 5% (16 weeks pregnancy) - although no prior dx T2DM, given elevated hgA1c likely preexisting T2DM    Following DIPP - on insulin qhs and with lunch and dinner, metformin prescribed but as of 7/5/2022 visit states she has not started it  Reports fasting  and -140s  When asked why she hasn't started the metformin she states she is concerned because it crosses the placenta  I reviewed that all studies of metformin in pregnancy have been reassuring as to safety in pregnancy and the addition of metformin could help her BG control  Reviewed that there are definite known risks of hyperglycemia throughout pregnancy to the baby  I strongly recommend she start the metformin  Fetal echo and f/u growth u/s planned

## 2022-07-05 NOTE — PROGRESS NOTES
Routine Prenatal Visit  [address], [address], [facility], [address] [facility]    Assessment/Plan:  Carolina Moralez is a 34y o  year old  at 22w3d who presents for routine prenatal visit  1  Pregnancy complicated by pre-existing type 2 diabetes, second trimester  Assessment & Plan:    Lab Results   Component Value Date    HGBA1C 6 5 (H) 2022     Early 1hr and 3hr GTT elevated in pregnancy  2022 HgA1C 6 5% (16 weeks pregnancy) - although no prior dx T2DM, given elevated hgA1c likely preexisting T2DM    Following DIPP - on insulin qhs and with lunch and dinner, metformin prescribed but as of 2022 visit states she has not started it  Reports fasting  and -140s  When asked why she hasn't started the metformin she states she is concerned because it crosses the placenta  I reviewed that all studies of metformin in pregnancy have been reassuring as to safety in pregnancy and the addition of metformin could help her BG control  Reviewed that there are definite known risks of hyperglycemia throughout pregnancy to the baby  I strongly recommend she start the metformin  Fetal echo and f/u growth u/s planned  2  Maternal morbid obesity, antepartum, second trimester [facility])  Assessment & Plan:  Taking ASA  3  Depression affecting pregnancy  Assessment & Plan:  Reports stable on Zoloft  4  Encounter for other specified  screening  -     ABO/Rh; Future  -     Antibody screen; Future  -     CBC; Future  -     RPR, Rfx Qn RPR/Confirm TP; Future  -     Glucose, 1H PG  -     ABO/Rh  -     Antibody screen  -     CBC  -     RPR, Rfx Qn RPR/Confirm TP    5  22 weeks gestation of pregnancy  -     POCT urine dip      Next OB Visit 4 weeks  Subjective:     CC: Prenatal care    Amarilys Barber is a 34 y o   female who presents for routine prenatal care at 22w3d  Pregnancy ROS: no leakage of fluid, pelvic pain, or vaginal bleeding    normal fetal movement  The following portions of the patient's history were reviewed and updated as appropriate: allergies, current medications, past family history, past medical history, obstetric history, gynecologic history, past social history, past surgical history and problem list       Objective:  /64   Ht 5' 3" (1 6 m)   Wt 91 2 kg (201 lb)   LMP 01/15/2022   BMI 35 61 kg/m²   Pregravid Weight/BMI: 86 2 kg (190 lb) (BMI 33 67)  Current Weight: 91 2 kg (201 lb)   Total Weight Gain: 4 99 kg (11 lb)   Pre-Sofia Vitals    Flowsheet Row Most Recent Value   Prenatal Assessment    Fetal Heart Rate 145   Fundal Height (cm) 23 cm   Movement Present   Prenatal Vitals    Blood Pressure 102/64   Weight - Scale 91 2 kg (201 lb)   Urine Albumin/Glucose    Dilation/Effacement/Station    Vaginal Drainage    Edema    LLE Edema Trace   RLE Edema Trace   Facial Edema None           General: Well appearing, no distress  Abdomen: Soft, gravid, nontender  Fundal Height: Appropriate for gestational age  Extremities: Warm and well perfused  Non tender

## 2022-07-17 NOTE — PROGRESS NOTES
Please refer to the [facility] ultrasound report in Ob Procedures for additional information regarding today's visit

## 2022-07-19 ENCOUNTER — ROUTINE PRENATAL (OUTPATIENT)
Dept: PERINATAL CARE | Facility: OTHER | Age: 32
End: 2022-07-19
Payer: COMMERCIAL

## 2022-07-19 VITALS
HEART RATE: 85 BPM | DIASTOLIC BLOOD PRESSURE: 78 MMHG | SYSTOLIC BLOOD PRESSURE: 119 MMHG | WEIGHT: 201.2 LBS | HEIGHT: 63 IN | BODY MASS INDEX: 35.65 KG/M2

## 2022-07-19 DIAGNOSIS — Z3A.24 24 WEEKS GESTATION OF PREGNANCY: ICD-10-CM

## 2022-07-19 DIAGNOSIS — O24.112 PREGNANCY COMPLICATED BY PRE-EXISTING TYPE 2 DIABETES, SECOND TRIMESTER: Primary | ICD-10-CM

## 2022-07-19 PROCEDURE — 76827 ECHO EXAM OF FETAL HEART: CPT | Performed by: OBSTETRICS & GYNECOLOGY

## 2022-07-19 PROCEDURE — 93325 DOPPLER ECHO COLOR FLOW MAPG: CPT | Performed by: OBSTETRICS & GYNECOLOGY

## 2022-07-19 PROCEDURE — 76825 ECHO EXAM OF FETAL HEART: CPT | Performed by: OBSTETRICS & GYNECOLOGY

## 2022-08-01 PROBLEM — Z3A.26 26 WEEKS GESTATION OF PREGNANCY: Status: ACTIVE | Noted: 2022-05-10

## 2022-08-02 ENCOUNTER — ROUTINE PRENATAL (OUTPATIENT)
Dept: OBGYN CLINIC | Facility: CLINIC | Age: 32
End: 2022-08-02

## 2022-08-02 VITALS — SYSTOLIC BLOOD PRESSURE: 110 MMHG | DIASTOLIC BLOOD PRESSURE: 70 MMHG | WEIGHT: 202 LBS | BODY MASS INDEX: 35.78 KG/M2

## 2022-08-02 DIAGNOSIS — O24.112 PREGNANCY COMPLICATED BY PRE-EXISTING TYPE 2 DIABETES, SECOND TRIMESTER: Primary | ICD-10-CM

## 2022-08-02 DIAGNOSIS — O99.210 OBESITY AFFECTING PREGNANCY, ANTEPARTUM: ICD-10-CM

## 2022-08-02 DIAGNOSIS — Z3A.26 26 WEEKS GESTATION OF PREGNANCY: ICD-10-CM

## 2022-08-02 DIAGNOSIS — F32.A DEPRESSION AFFECTING PREGNANCY: ICD-10-CM

## 2022-08-02 DIAGNOSIS — O99.340 DEPRESSION AFFECTING PREGNANCY: ICD-10-CM

## 2022-08-02 DIAGNOSIS — Z67.91 RH NEGATIVE STATUS DURING PREGNANCY IN SECOND TRIMESTER: ICD-10-CM

## 2022-08-02 DIAGNOSIS — O26.892 RH NEGATIVE STATUS DURING PREGNANCY IN SECOND TRIMESTER: ICD-10-CM

## 2022-08-02 DIAGNOSIS — Z36.89 ENCOUNTER FOR OTHER SPECIFIED ANTENATAL SCREENING: ICD-10-CM

## 2022-08-02 LAB
SL AMB  POCT GLUCOSE, UA: NORMAL
SL AMB POCT URINE PROTEIN: NORMAL

## 2022-08-02 NOTE — ASSESSMENT & PLAN NOTE
Discussed with patient recommendation and rationale for Adacel vaccination after 28 weeks of pregnancy for protection of infant against Whooping cough  Encouraged to get between 28-36 weeks of pregnancy for maximum effect  Will offer next appt

## 2022-08-02 NOTE — ASSESSMENT & PLAN NOTE
Now taking insulin and metformin  Following with DIPP  Follow up growth scheduled 8/9/2022  Patient asking about routes of delivery  Discussed as long as baby not expected to be >4,500gr at 39 weeks, STACI is indicated  Generally we do recommend delivery at 39 weeks for GDMA2      Lab Results   Component Value Date    HGBA1C 6 5 (H) 05/24/2022

## 2022-08-02 NOTE — PROGRESS NOTES
Routine Prenatal Visit  [address] [facility], [address], [facility], [address] [address]    Assessment/Plan:  Katia Mayer is a 34y o  year old  at 26w3d who presents for routine prenatal visit  1  Pregnancy complicated by pre-existing type 2 diabetes, second trimester  Assessment & Plan:  Now taking insulin and metformin  Following with DIPP  Follow up growth scheduled 2022  Patient asking about routes of delivery  Discussed as long as baby not expected to be >4,500gr at 39 weeks, STACI is indicated  Generally we do recommend delivery at 39 weeks for GDMA2  Lab Results   Component Value Date    HGBA1C 6 5 (H) 2022         2  Obesity affecting pregnancy, antepartum  Assessment & Plan:  f/u growth 2022       3  Depression affecting pregnancy  Assessment & Plan:  Continues Zoloft, doing well      4  Encounter for other specified  screening  -     ABO/Rh; Future  -     Antibody screen; Future  -     CBC; Future  -     RPR, Rfx Qn RPR/Confirm TP; Future  -     ABO/Rh  -     Antibody screen  -     CBC  -     RPR, Rfx Qn RPR/Confirm TP    5  Rh negative status during pregnancy in second trimester  Assessment & Plan: Will give Rhogam at next appointment  Orders:  -     ABO/Rh; Future  -     Antibody screen; Future  -     ABO/Rh  -     Antibody screen    6  26 weeks gestation of pregnancy  Assessment & Plan:  Discussed with patient recommendation and rationale for Adacel vaccination after 28 weeks of pregnancy for protection of infant against Whooping cough  Encouraged to get between 28-36 weeks of pregnancy for maximum effect  Will offer next appt  Orders:  -     POCT urine dip      Next OB Visit 2 weeks  Subjective:     CC: Prenatal care    Lewayne Holter is a 34 y o   female who presents for routine prenatal care at 26w3d  Pregnancy ROS: no leakage of fluid, pelvic pain, or vaginal bleeding  normal fetal movement      The following portions of the patient's history were reviewed and updated as appropriate: allergies, current medications, past family history, past medical history, obstetric history, gynecologic history, past social history, past surgical history and problem list       Objective:  /70   Wt 91 6 kg (202 lb)   LMP 01/15/2022   Breastfeeding No   BMI 35 78 kg/m²   Pregravid Weight/BMI: 86 2 kg (190 lb) (BMI 33 67)  Current Weight: 91 6 kg (202 lb)   Total Weight Gain: 5 443 kg (12 lb)   Pre-Sofia Vitals    Flowsheet Row Most Recent Value   Prenatal Assessment    Fetal Heart Rate 145   Fundal Height (cm) 26 cm   Movement Present   Prenatal Vitals    Blood Pressure 110/70   Weight - Scale 91 6 kg (202 lb)   Urine Albumin/Glucose    Dilation/Effacement/Station    Vaginal Drainage    Edema    LLE Edema None   RLE Edema None           General: Well appearing, no distress  Abdomen: Soft, gravid, nontender  Fundal Height: Appropriate for gestational age  Extremities: Warm and well perfused  Non tender

## 2022-08-04 ENCOUNTER — PATIENT MESSAGE (OUTPATIENT)
Dept: PERINATAL CARE | Facility: CLINIC | Age: 32
End: 2022-08-04

## 2022-08-09 ENCOUNTER — ULTRASOUND (OUTPATIENT)
Dept: PERINATAL CARE | Facility: OTHER | Age: 32
End: 2022-08-09
Payer: COMMERCIAL

## 2022-08-09 VITALS
SYSTOLIC BLOOD PRESSURE: 108 MMHG | HEIGHT: 63 IN | HEART RATE: 102 BPM | BODY MASS INDEX: 36.39 KG/M2 | WEIGHT: 205.4 LBS | DIASTOLIC BLOOD PRESSURE: 73 MMHG

## 2022-08-09 DIAGNOSIS — O99.210 OBESITY AFFECTING PREGNANCY, ANTEPARTUM: ICD-10-CM

## 2022-08-09 DIAGNOSIS — O24.414 INSULIN CONTROLLED GESTATIONAL DIABETES MELLITUS (GDM) IN SECOND TRIMESTER: ICD-10-CM

## 2022-08-09 DIAGNOSIS — Z3A.27 27 WEEKS GESTATION OF PREGNANCY: ICD-10-CM

## 2022-08-09 DIAGNOSIS — Z36.89 ENCOUNTER FOR ULTRASOUND TO ASSESS FETAL GROWTH: ICD-10-CM

## 2022-08-09 DIAGNOSIS — O24.112 PREGNANCY COMPLICATED BY PRE-EXISTING TYPE 2 DIABETES, SECOND TRIMESTER: Primary | ICD-10-CM

## 2022-08-09 PROBLEM — E66.01 MATERNAL MORBID OBESITY IN SECOND TRIMESTER, ANTEPARTUM (HCC): Status: RESOLVED | Noted: 2022-05-10 | Resolved: 2022-08-09

## 2022-08-09 PROBLEM — R73.01 IFG (IMPAIRED FASTING GLUCOSE): Status: RESOLVED | Noted: 2021-08-13 | Resolved: 2022-08-09

## 2022-08-09 PROBLEM — O99.212 MATERNAL MORBID OBESITY IN SECOND TRIMESTER, ANTEPARTUM (HCC): Status: RESOLVED | Noted: 2022-05-10 | Resolved: 2022-08-09

## 2022-08-09 PROBLEM — O99.212 MATERNAL OBESITY, ANTEPARTUM, SECOND TRIMESTER: Status: RESOLVED | Noted: 2022-06-29 | Resolved: 2022-08-09

## 2022-08-09 PROCEDURE — 76816 OB US FOLLOW-UP PER FETUS: CPT | Performed by: OBSTETRICS & GYNECOLOGY

## 2022-08-09 PROCEDURE — 99213 OFFICE O/P EST LOW 20 MIN: CPT | Performed by: OBSTETRICS & GYNECOLOGY

## 2022-08-09 NOTE — LETTER
August 9, 2022     Yesica Sanchez MD  [facility] [address]  [address] [facility] [place] [address]  [facility]    Patient: Michelle Russo   YOB: 1990   Date of Visit: 8/9/2022       Dear Dr Belgica Harper: Thank you for referring Michelle Russo to me for evaluation  Below are my notes for this consultation  If you have questions, please do not hesitate to call me  I look forward to following your patient along with you  Sincerely,        Radha George MD        CC: No Recipients  Radha George MD  8/9/2022  2:10 PM  Sign when Signing Visit  [address]: Ms Suzanna Downey was seen today at 27w3d for fetal growth assessment ultrasound  See ultrasound report under "OB Procedures" tab    Please don't hesitate to contact our office with any concerns or questions   -Radha George MD

## 2022-08-10 RX ORDER — INSULIN GLARGINE 100 [IU]/ML
36 INJECTION, SOLUTION SUBCUTANEOUS
Qty: 15 ML | Refills: 0 | Status: SHIPPED | OUTPATIENT
Start: 2022-08-10 | End: 2022-09-06

## 2022-08-12 ENCOUNTER — DOCUMENTATION (OUTPATIENT)
Dept: PERINATAL CARE | Facility: CLINIC | Age: 32
End: 2022-08-12

## 2022-08-12 NOTE — PROGRESS NOTES
Date: 08/12/22  Maxx Ochoa  1990  Estimated Date of Delivery: 11/5/22  27w6d  OB/GYN: Choco Stahl  GDM in pregnancy method of control unspecified and Insulin controlled GDM  Additional Pregnancy Complications: Obesity, History of prediabetes with A1c 6 3%        Plan: Metformin 1000 mg with dinner (requested patient update diabetes team on tolerance and possible increase with next report if tolerating)   Basaglar- increase from 56 to 62 units split dose into 2 (31 units each dose injected one after another into 2 differednt sites spaced 2 to 3 inches apart)  Novolog- increase 4 to 6 units before breakfast, continue 10 units before lunch and increase from 8 to 10 units before dinner      Diet: : 1800 calorie (CHO: 12-03-00-80-70-36) (PRO:2-1-3-1-3-2)  Gestational diabetes meal plan; 3 meals and 3 snacks  To increase to 2100 calories for 2nd/3rd trimesters when appetite increases  Testing blood sugars: 4 x per day (Fasting, 2 hour after start of each meal)  Meter: Accu-chek Guide Me  Activity: Walks 30 minutes daily, follow OB recommendations  Support System: Significant Other / family   Patient Goal: "I will plan my meals and snacks every day"  Education:  Class 2 completed with Chuckie Gabriele on 05/24/2022    CRNP:  05/10/2022  06/28/2022 and next 8/16/22    Weight Change:    Pre-gravid weight: 86 2 kg (190 lb)  6 985 kg (15 lb 6 4 oz)  Weight gain recommendations: BMI (> 30) 11-20 lbs    Ultrasounds  8/9/22 US; fetal growth normal AC: 89% EFW: 78%  HC: 55%  Next US 9/13/22    Labs  Lab Results   Component Value Date    [id number] 149 (H) 04/05/2022     Lab Results   Component Value Date    GLUF 123 (H) 05/03/2022    [id number] 120 05/03/2022     Lab Results   Component Value Date    HGBA1C 6 5 (H) 05/24/2022     Patient reminded to completed A1c active    Further fetal surveillance  Beginning at 32 weeks, NST / HUMBERTO twice a week    Jovanna Morales RD,LDN<CDE

## 2022-08-15 LAB
EXTERNAL HEMOGLOBIN: 11.3 G/DL
EXTERNAL PLATELET COUNT: 220 K/ΜL
EXTERNAL SYPHILIS RPR SCREEN: NORMAL

## 2022-08-16 ENCOUNTER — TELEMEDICINE (OUTPATIENT)
Dept: PERINATAL CARE | Facility: CLINIC | Age: 32
End: 2022-08-16

## 2022-08-16 ENCOUNTER — ROUTINE PRENATAL (OUTPATIENT)
Dept: OBGYN CLINIC | Facility: CLINIC | Age: 32
End: 2022-08-16

## 2022-08-16 VITALS — HEIGHT: 63 IN | BODY MASS INDEX: 36.32 KG/M2 | WEIGHT: 205 LBS

## 2022-08-16 VITALS — WEIGHT: 205.8 LBS | DIASTOLIC BLOOD PRESSURE: 60 MMHG | BODY MASS INDEX: 36.46 KG/M2 | SYSTOLIC BLOOD PRESSURE: 110 MMHG

## 2022-08-16 DIAGNOSIS — Z3A.28 28 WEEKS GESTATION OF PREGNANCY: ICD-10-CM

## 2022-08-16 DIAGNOSIS — O99.810 HYPERGLYCEMIA IN PREGNANCY: ICD-10-CM

## 2022-08-16 DIAGNOSIS — O99.210 OBESITY AFFECTING PREGNANCY, ANTEPARTUM: ICD-10-CM

## 2022-08-16 DIAGNOSIS — O26.892 RH NEGATIVE STATUS DURING PREGNANCY IN SECOND TRIMESTER: ICD-10-CM

## 2022-08-16 DIAGNOSIS — O24.113 PREGNANCY COMPLICATED BY PRE-EXISTING TYPE 2 DIABETES IN THIRD TRIMESTER: Primary | ICD-10-CM

## 2022-08-16 DIAGNOSIS — Z67.91 RH NEGATIVE STATUS DURING PREGNANCY IN SECOND TRIMESTER: ICD-10-CM

## 2022-08-16 DIAGNOSIS — O99.340 DEPRESSION AFFECTING PREGNANCY: ICD-10-CM

## 2022-08-16 DIAGNOSIS — Z31.82 RH INCOMPATIBILITY: ICD-10-CM

## 2022-08-16 DIAGNOSIS — F32.A DEPRESSION AFFECTING PREGNANCY: ICD-10-CM

## 2022-08-16 LAB
ABO GROUP BLD: NORMAL
BLD GP AB SCN SERPL QL: NEGATIVE
ERYTHROCYTE [DISTWIDTH] IN BLOOD BY AUTOMATED COUNT: 12.3 % (ref 11.7–15.4)
HCT VFR BLD AUTO: 34.9 % (ref 34–46.6)
HGB BLD-MCNC: 11.5 G/DL (ref 11.1–15.9)
MCH RBC QN AUTO: 32.4 PG (ref 26.6–33)
MCHC RBC AUTO-ENTMCNC: 33 G/DL (ref 31.5–35.7)
MCV RBC AUTO: 98 FL (ref 79–97)
PLATELET # BLD AUTO: 220 X10E3/UL (ref 150–450)
RBC # BLD AUTO: 3.55 X10E6/UL (ref 3.77–5.28)
RH BLD: NEGATIVE
RPR SER QL: NON REACTIVE
SL AMB  POCT GLUCOSE, UA: 500
SL AMB POCT URINE PROTEIN: NEGATIVE
WBC # BLD AUTO: 11.3 X10E3/UL (ref 3.4–10.8)

## 2022-08-16 NOTE — ASSESSMENT & PLAN NOTE
-Pre-pregnancy weight 190 lbs  -current weight 205 lbs  -TWG 15 lbs  -Recommended weight gain 11 to 20 lbs  -Continue GDM diet

## 2022-08-16 NOTE — PROGRESS NOTES
Virtual Regular Visit    Verification of patient location:[place]    Patient is located in the following state in which I hold an active license [place]      Assessment/Plan:    Problem List Items Addressed This Visit        Endocrine    Hyperglycemia in pregnancy    Relevant Orders    Comprehensive metabolic panel    Pregnancy complicated by pre-existing type 2 diabetes in third trimester - Primary     -Pending A1c   -Check CMP  -Due to hyperglycemia; increase Novolog to 12-15-15-0 before meals 1-2-3    -Add Metformin 500 mg with breakfast and continue 1000 mg with dinner    -Continue basaglar 62 units daily split dose into 2 (31 units each injected one after another into 2 different sites spaced 2 to 3 inches apart) at 9:30 PM   -Try to follow GDM diet; always have combination of carbohydrates and protein per meal/snack    -Avoid fasting greater than 10 hours overnight   -Continue SMBG; add 3 AM check and alternate before lunch and before dinner readings for more glucose data  -Report every Monday and Thursday for insulin adjustments until readings are within goal    -Always have glucose available for hypoglycemia; use 15 by 15 rule  -Fetal growth ultrasound as recommended   -Starting at 32 weeks gestation NST twice a week and HUMBERTO weekly  -Stop baby aspirin at 36 weeks   -Continue follow up with OB and MFM as recommended   -Continue close contact with diabetes team    Lab Results   Component Value Date    HGBA1C 6 5 (H) 05/24/2022            Relevant Orders    Comprehensive metabolic panel       Other    Obesity affecting pregnancy, antepartum     -Pre-pregnancy weight 190 lbs  -current weight 205 lbs  -TWG 15 lbs  -Recommended weight gain 11 to 20 lbs  -Continue GDM diet            Relevant Orders    Comprehensive metabolic panel    28 weeks gestation of pregnancy    Relevant Orders    Comprehensive metabolic panel    BMI 83 3-57 5,ICEUO    Relevant Orders    Comprehensive metabolic panel      Plan:   Adjusting basal insulin to FBS target once more glucose data is available  Assessing glucose readings twice a week for recommendations  Reason for visit is   Chief Complaint   Patient presents with    Virtual Regular Visit    Gestational Diabetes        Encounter provider Napoleon Chang, [facility]    Provider located at [address] [place] [id number] [phone]      Recent Visits  No visits were found meeting these conditions  Showing recent visits within past 7 days and meeting all other requirements  Today's Visits  Date Type Provider Dept   08/16/22 Telemedicine Napoleon Chang, [facility] today's visits and meeting all other requirements  Future Appointments  No visits were found meeting these conditions  Showing future appointments within next 150 days and meeting all other requirements       The patient was identified by name and date of birth  Nelia Lombard was informed that this is a telemedicine visit and that the visit is being conducted through Telephone  My office door was closed  No one else was in the room  She acknowledged consent and understanding of privacy and security of the video platform  The patient has agreed to participate and understands they can discontinue the visit at any time  Unable to connect virtually and patient agreed to telephone visit  Patient is aware this is a billable service  Subjective  Nelia Lombard is a 34 y o  female 29 3/7 weeks gestation GDM possibly pre-existing T2DM and will need to be evaluated post delivery  On basaglar 62 units daily split dose; increase started on Saturday 8/13/2022; Novolog 6-10-10-0 before meals 1-2-3 and Metformin 1000 mg with dinner  Tolerating Metformin  No hypoglycemia  Eating 3 meals and 3 snacks a day; does eat off of meal plan  Reporting via glucose flowsheet; last date noted 8/10/2022   Still reports fasting glucose readings above goal  Currently having issues with heartburn and will take tums; unknown if tums impacting glucose readings  For now will try to adjust bolus insulin and increase Metformin before adjusting basal insulin again  Encouraged to complete overnight glucose spot check and to start assessing before lunch and before meals at alternating times  Recommended discussing with OB issue with heartburn  Does not exercise  Works as LPN  Positive fetal movement  HPI     Past Medical History:   Diagnosis Date    Chronic fatigue     Depression     currently on Zoloft 50 mg managed by PCP  Past Surgical History:   Procedure Laterality Date    TONSILECTOMY AND ADNOIDECTOMY      WISDOM TOOTH EXTRACTION  2019       Current Outpatient Medications   Medication Sig Dispense Refill    Accu-Chek Guide test strip Test 4 times a day and as instructed  GDM  100 each 3    Accu-Chek Softclix Lancets lancets Use 4 a day  GDM  100 each 7    aspirin 81 mg chewable tablet Chew 162 mg daily Stop at 36 weeks gestation      Basaglar KwikPen 100 units/mL SOPN Inject 36 Units under the skin daily at bedtime At 9 PM daily; titrate as directed  15 mL 0    Blood Glucose Monitoring Suppl (Accu-Chek Guide Me) w/Device KIT Dispense 1 kit  GDM  1 kit 0    insulin aspart (NovoLOG FlexPen) 100 UNIT/ML injection pen Inject 4 units before breakfast; 10 units before lunch and 10 units before dinner  90 day supply  30 mL 0    Insulin Pen Needle 31G X 5 MM MISC Use 1 daily 100 each 3    Magnesium 100 MG TABS       metFORMIN (GLUCOPHAGE) 500 mg tablet Take 1 tablet (500 mg total) by mouth 2 (two) times a day with meals 90 day supply  180 tablet 1    Prenatal MV-Min-Fe Fum-FA-DHA (PRENATAL 1 PO) Take 1 tablet by mouth daily      sertraline (ZOLOFT) 50 mg tablet TAKE 1 TABLET BY MOUTH EVERY DAY 90 tablet 1     No current facility-administered medications for this visit          No Known Allergies    Review of Systems   Constitutional: Positive for fatigue  Negative for fever  Eyes: Negative for visual disturbance  Respiratory: Negative for cough and shortness of breath  Cardiovascular: Negative for chest pain and palpitations  Gastrointestinal: Negative for constipation, diarrhea, nausea and vomiting  Heartburn    Endocrine: Positive for polyuria  Negative for polydipsia and polyphagia  Genitourinary: Negative for difficulty urinating and vaginal bleeding  Neurological: Positive for numbness  Negative for headaches  Psychiatric/Behavioral: Positive for sleep disturbance  Video Exam  Refer to glucose flowsheet hyperglycemia noted  Vitals:    08/16/22 1019   Weight: 93 kg (205 lb)   Height: 5' 3" (1 6 m)       Physical Exam   It was my intent to perform this visit via video technology but the patient was not able to do a video connection so the visit was completed via audio telephone only  I spent 30 minutes directly with the patient during this visit

## 2022-08-16 NOTE — ASSESSMENT & PLAN NOTE
-Pending A1c   -Check CMP  -Due to hyperglycemia; increase Novolog to 12-15-15-0 before meals 1-2-3    -Add Metformin 500 mg with breakfast and continue 1000 mg with dinner    -Continue basaglar 62 units daily split dose into 2 (31 units each injected one after another into 2 different sites spaced 2 to 3 inches apart) at 9:30 PM   -Try to follow GDM diet; always have combination of carbohydrates and protein per meal/snack    -Avoid fasting greater than 10 hours overnight   -Continue SMBG; add 3 AM check and alternate before lunch and before dinner readings for more glucose data  -Report every Monday and Thursday for insulin adjustments until readings are within goal    -Always have glucose available for hypoglycemia; use 15 by 15 rule  -Fetal growth ultrasound as recommended   -Starting at 32 weeks gestation NST twice a week and HUMBERTO weekly     -Stop baby aspirin at 36 weeks   -Continue follow up with OB and MFM as recommended   -Continue close contact with diabetes team    Lab Results   Component Value Date    HGBA1C 6 5 (H) 05/24/2022

## 2022-08-16 NOTE — PATIENT INSTRUCTIONS
-Pending A1c   -Check CMP  -Due to hyperglycemia; increase Novolog to 12-15-15-0 before meals 1-2-3    -Add Metformin 500 mg with breakfast and continue 1000 mg with dinner    -Continue basaglar 62 units daily split dose into 2 (31 units each injected one after another into 2 different sites spaced 2 to 3 inches apart) at 9:30 PM   -Try to follow GDM diet; always have combination of carbohydrates and protein per meal/snack    -Avoid fasting greater than 10 hours overnight   -Continue SMBG; add 3 AM check and alternate before lunch and before dinner readings for more glucose data  -Report every Monday and Thursday for insulin adjustments until readings are within goal    -Always have glucose available for hypoglycemia; use 15 by 15 rule  -Fetal growth ultrasound as recommended   -Starting at 32 weeks gestation NST twice a week and HUMBERTO weekly     -Stop baby aspirin at 36 weeks   -Continue follow up with OB and MFM as recommended   -Continue close contact with diabetes team

## 2022-08-23 ENCOUNTER — OFFICE VISIT (OUTPATIENT)
Dept: FAMILY MEDICINE CLINIC | Facility: CLINIC | Age: 32
End: 2022-08-23
Payer: COMMERCIAL

## 2022-08-23 VITALS
HEART RATE: 90 BPM | OXYGEN SATURATION: 99 % | DIASTOLIC BLOOD PRESSURE: 80 MMHG | BODY MASS INDEX: 37.39 KG/M2 | HEIGHT: 63 IN | WEIGHT: 211 LBS | SYSTOLIC BLOOD PRESSURE: 118 MMHG | RESPIRATION RATE: 18 BRPM | TEMPERATURE: 97.3 F

## 2022-08-23 DIAGNOSIS — Z00.00 ANNUAL PHYSICAL EXAM: Primary | ICD-10-CM

## 2022-08-23 DIAGNOSIS — F32.0 CURRENT MILD EPISODE OF MAJOR DEPRESSIVE DISORDER WITHOUT PRIOR EPISODE (HCC): ICD-10-CM

## 2022-08-23 DIAGNOSIS — F41.1 GAD (GENERALIZED ANXIETY DISORDER): ICD-10-CM

## 2022-08-23 DIAGNOSIS — O24.113 PREGNANCY COMPLICATED BY PRE-EXISTING TYPE 2 DIABETES IN THIRD TRIMESTER: ICD-10-CM

## 2022-08-23 DIAGNOSIS — Z13.6 SCREENING FOR CARDIOVASCULAR CONDITION: ICD-10-CM

## 2022-08-23 PROCEDURE — 3725F SCREEN DEPRESSION PERFORMED: CPT | Performed by: FAMILY MEDICINE

## 2022-08-23 PROCEDURE — 99395 PREV VISIT EST AGE 18-39: CPT | Performed by: FAMILY MEDICINE

## 2022-08-23 NOTE — ASSESSMENT & PLAN NOTE
Lab Results   Component Value Date    HGBA1C 6 4 (H) 08/15/2022       Continue follow up with ob/gyn and diabetes counselor

## 2022-08-23 NOTE — PATIENT INSTRUCTIONS

## 2022-08-23 NOTE — ASSESSMENT & PLAN NOTE
Depression Screening Follow-up Plan: Patient's depression screening was positive with a PHQ-2 score of   Their PHQ-9 score was 7  Patient assessed for underlying major depression  They have no active suicidal ideations  Brief counseling provided and recommend additional follow-up/re-evaluation next office visit       Continue zoloft as prescribed

## 2022-08-23 NOTE — PROGRESS NOTES
[address] [facility] PRACTICE    NAME: Fadia Chatterjee  AGE: 34 y o  SEX: female  : 1990     DATE: 2022     Assessment and Plan:     Problem List Items Addressed This Visit        Endocrine    Pregnancy complicated by pre-existing type 2 diabetes in third trimester       Lab Results   Component Value Date    HGBA1C 6 4 (H) 08/15/2022       Continue follow up with ob/gyn and diabetes counselor             Other    Current mild episode of major depressive disorder without prior episode ([facility] [address] )     Depression Screening Follow-up Plan: Patient's depression screening was positive with a PHQ-2 score of   Their PHQ-9 score was 7  Patient assessed for underlying major depression  They have no active suicidal ideations  Brief counseling provided and recommend additional follow-up/re-evaluation next office visit  Continue zoloft as prescribed          [place] (generalized anxiety disorder)      Other Visit Diagnoses     Annual physical exam    -  Primary    Relevant Orders    Lipid panel    Screening for cardiovascular condition        Relevant Orders    Lipid panel          Immunizations and preventive care screenings were discussed with patient today  Appropriate education was printed on patient's after visit summary  Counseling:  Alcohol/drug use: discussed moderation in alcohol intake, the recommendations for healthy alcohol use, and avoidance of illicit drug use  Dental Health: discussed importance of regular tooth brushing, flossing, and dental visits  Injury prevention: discussed safety/seat belts, safety helmets, smoke detectors, carbon dioxide detectors, and smoking near bedding or upholstery  Sexual health: discussed sexually transmitted diseases, partner selection, use of condoms, avoidance of unintended pregnancy, and contraceptive alternatives  · Exercise: the importance of regular exercise/physical activity was discussed  Recommend exercise 3-5 times per week for at least 30 minutes  No follow-ups on file  Chief Complaint:     Chief Complaint   Patient presents with    Annual Exam      History of Present Illness:     Adult Annual Physical   Patient here for a comprehensive physical exam      Diet and Physical Activity  · Diet/Nutrition: well balanced diet  · Exercise: moderate cardiovascular exercise  Depression Screening  PHQ-2/9 Depression Screening    Little interest or pleasure in doing things: 0 - not at all  Feeling down, depressed, or hopeless: 1 - several days  Trouble falling or staying asleep, or sleeping too much: 0 - not at all  Feeling tired or having little energy: 3 - nearly every day  Poor appetite or overeating: 3 - nearly every day  Feeling bad about yourself - or that you are a failure or have let yourself or your family down: 0 - not at all  Trouble concentrating on things, such as reading the newspaper or watching television: 0 - not at all  Moving or speaking so slowly that other people could have noticed  Or the opposite - being so fidgety or restless that you have been moving around a lot more than usual: 0 - not at all  Thoughts that you would be better off dead, or of hurting yourself in some way: 0 - not at all  PHQ-9 Score: 7   PHQ-9 Interpretation: Mild depression          /GYN Health  · Currently pregnant 29 weeks     Review of Systems:     Review of Systems   Constitutional: Negative for chills, fatigue and fever  HENT: Negative for congestion, postnasal drip, rhinorrhea and sinus pressure  Eyes: Negative for photophobia and visual disturbance  Respiratory: Negative for cough and shortness of breath  Cardiovascular: Negative for chest pain, palpitations and leg swelling  Gastrointestinal: Negative for abdominal pain, constipation, diarrhea, nausea and vomiting  Genitourinary: Negative for difficulty urinating and dysuria     Musculoskeletal: Negative for arthralgias and myalgias  Skin: Negative for color change and rash  Neurological: Negative for dizziness, weakness, light-headedness and headaches  Past Medical History:     Past Medical History:   Diagnosis Date    Chronic fatigue     Depression     currently on Zoloft 50 mg managed by PCP         Past Surgical History:     Past Surgical History:   Procedure Laterality Date    TONSILECTOMY AND ADNOIDECTOMY      WISDOM TOOTH EXTRACTION  2019      Social History:     Social History     Socioeconomic History    Marital status: /Civil Union     Spouse name: None    Number of children: None    Years of education: None    Highest education level: None   Occupational History    Occupation: LPN    Tobacco Use    Smoking status: Former Smoker     Packs/day: 0 50     Years: 10 00     Pack years: 5 00     Types: Cigarettes     Quit date: 5/6/2019     Years since quitting: 3 3    Smokeless tobacco: Never Used   Vaping Use    Vaping Use: Never used   Substance and Sexual Activity    Alcohol use: Not Currently     Alcohol/week: 2 0 standard drinks     Types: 2 Standard drinks or equivalent per week    Drug use: Never    Sexual activity: Yes     Partners: Male     Birth control/protection: None   Other Topics Concern    None   Social History Narrative    None     Social Determinants of Health     Financial Resource Strain: Not on file   Food Insecurity: Not on file   Transportation Needs: Not on file   Physical Activity: Inactive    Days of Exercise per Week: 0 days   [employer] of Exercise per Session: 0 min   Stress: Stress Concern Present    Feeling of Stress : Very much   Social Connections: Not on file   Intimate Partner Violence: Not At Risk    Fear of Current or Ex-Partner: No    Emotionally Abused: No    Physically Abused: No    Sexually Abused: No   Housing Stability: Not on file      Family History:     Family History   Problem Relation Age of Onset    Thyroid disease Mother     Diabetes type II Father     Diabetes type II Maternal Grandmother     Cancer Maternal Grandfather     Lung cancer Maternal Grandfather     Diabetes type II Maternal Grandfather     Breast cancer Neg Hx     Ovarian cancer Neg Hx     Colon cancer Neg Hx     Down syndrome Neg Hx     Fragile X syndrome Neg Hx     Cystic fibrosis Neg Hx       Current Medications:     Current Outpatient Medications   Medication Sig Dispense Refill    Accu-Chek Guide test strip Test 4 times a day and as instructed  GDM  100 each 3    Accu-Chek Softclix Lancets lancets Use 4 a day  GDM  100 each 7    aspirin 81 mg chewable tablet Chew 162 mg daily Stop at 36 weeks gestation      Basaglar KwikPen 100 units/mL SOPN Inject 36 Units under the skin daily at bedtime At 9 PM daily; titrate as directed  15 mL 0    Blood Glucose Monitoring Suppl (Accu-Chek Guide Me) w/Device KIT Dispense 1 kit  GDM  1 kit 0    insulin aspart (NovoLOG FlexPen) 100 UNIT/ML injection pen Inject 15 units before breakfast; 18 units before lunch and 18 units before dinner  90 day supply  45 mL 0    Insulin Pen Needle 31G X 5 MM MISC Use 1 daily 100 each 3    Magnesium 100 MG TABS       metFORMIN (GLUCOPHAGE) 500 mg tablet Take 1 tablet (500 mg total) by mouth 2 (two) times a day with meals 90 day supply  (Patient taking differently: Take 500 mg by mouth 2 (two) times a day with meals 90 day supply  I tablet w/ breakfast 2 w/dinner) 180 tablet 1    Prenatal MV-Min-Fe Fum-FA-DHA (PRENATAL 1 PO) Take 1 tablet by mouth daily      sertraline (ZOLOFT) 50 mg tablet TAKE 1 TABLET BY MOUTH EVERY DAY 90 tablet 1     No current facility-administered medications for this visit        Allergies:     No Known Allergies   Physical Exam:     /80   Pulse 90   Temp (!) 97 3 °F (36 3 °C) (Tympanic)   Resp 18   Ht 5' 3 4" (1 61 m)   Wt 95 7 kg (211 lb)   LMP 01/15/2022   SpO2 99%   BMI 36 91 kg/m²     Physical Exam  Constitutional:       General: She is not in acute distress  Appearance: Normal appearance  She is not ill-appearing, toxic-appearing or diaphoretic  HENT:      Head: Normocephalic and atraumatic  Right Ear: Tympanic membrane and ear canal normal       Left Ear: Tympanic membrane and ear canal normal       Nose: Nose normal  No congestion  Mouth/Throat:      Mouth: Mucous membranes are moist       Pharynx: Oropharynx is clear  No oropharyngeal exudate  Eyes:      Extraocular Movements: Extraocular movements intact  Conjunctiva/sclera: Conjunctivae normal       Pupils: Pupils are equal, round, and reactive to light  Cardiovascular:      Rate and Rhythm: Normal rate and regular rhythm  Pulses: Normal pulses  Heart sounds: No murmur heard  Pulmonary:      Effort: Pulmonary effort is normal       Breath sounds: Normal breath sounds  No wheezing, rhonchi or rales  Abdominal:      General: Bowel sounds are normal  There is no distension  Palpations: Abdomen is soft  Tenderness: There is no abdominal tenderness  Musculoskeletal:         General: No swelling or tenderness  Normal range of motion  Cervical back: Normal range of motion and neck supple  Skin:     General: Skin is warm and dry  Capillary Refill: Capillary refill takes less than 2 seconds  Neurological:      General: No focal deficit present  Mental Status: She is alert and oriented to person, place, and time  Cranial Nerves: No cranial nerve deficit  Psychiatric:         Mood and Affect: Mood normal          Behavior: Behavior normal          Thought Content:  Thought content normal           Cambridge Card, DO   [address] [employer]

## 2022-08-30 ENCOUNTER — ROUTINE PRENATAL (OUTPATIENT)
Dept: OBGYN CLINIC | Facility: CLINIC | Age: 32
End: 2022-08-30

## 2022-08-30 VITALS
WEIGHT: 212 LBS | BODY MASS INDEX: 37.56 KG/M2 | DIASTOLIC BLOOD PRESSURE: 68 MMHG | SYSTOLIC BLOOD PRESSURE: 114 MMHG | HEIGHT: 63 IN

## 2022-08-30 DIAGNOSIS — F32.A DEPRESSION AFFECTING PREGNANCY: ICD-10-CM

## 2022-08-30 DIAGNOSIS — O26.893 RH NEGATIVE STATUS DURING PREGNANCY IN THIRD TRIMESTER: ICD-10-CM

## 2022-08-30 DIAGNOSIS — O99.210 OBESITY AFFECTING PREGNANCY, ANTEPARTUM: ICD-10-CM

## 2022-08-30 DIAGNOSIS — O99.340 DEPRESSION AFFECTING PREGNANCY: ICD-10-CM

## 2022-08-30 DIAGNOSIS — Z34.03 ENCOUNTER FOR SUPERVISION OF NORMAL FIRST PREGNANCY IN THIRD TRIMESTER: ICD-10-CM

## 2022-08-30 DIAGNOSIS — O24.113 PREGNANCY COMPLICATED BY PRE-EXISTING TYPE 2 DIABETES IN THIRD TRIMESTER: ICD-10-CM

## 2022-08-30 DIAGNOSIS — Z23 NEED FOR TDAP VACCINATION: Primary | ICD-10-CM

## 2022-08-30 DIAGNOSIS — Z67.91 RH NEGATIVE STATUS DURING PREGNANCY IN THIRD TRIMESTER: ICD-10-CM

## 2022-08-30 DIAGNOSIS — O24.414 INSULIN CONTROLLED GESTATIONAL DIABETES MELLITUS (GDM) IN THIRD TRIMESTER: ICD-10-CM

## 2022-08-30 DIAGNOSIS — O99.810 HYPERGLYCEMIA IN PREGNANCY: ICD-10-CM

## 2022-08-30 LAB
SL AMB  POCT GLUCOSE, UA: NEGATIVE
SL AMB POCT URINE PROTEIN: NEGATIVE

## 2022-08-30 RX ORDER — INSULIN GLARGINE 100 [IU]/ML
36 INJECTION, SOLUTION SUBCUTANEOUS
Qty: 15 ML | Refills: 0 | Status: CANCELLED | OUTPATIENT
Start: 2022-08-30

## 2022-08-30 NOTE — PROGRESS NOTES
Routine Prenatal Visit  [address] [facility], [address], [facility], [address]    Assessment/Plan:  Carolina Moralez is a 34y o  year old  at 30w3d who presents for routine prenatal visit  1  Need for Tdap vaccination  -     Tdap Vaccine greater than or equal to 8yo    2  Encounter for supervision of normal first pregnancy in third trimester  -     POCT urine dip    3  Pregnancy complicated by pre-existing type 2 diabetes in third trimester  Comments:   HgA1C 6 4    4  Hyperglycemia in pregnancy    5  Rh negative status during pregnancy in third trimester    6  Obesity affecting pregnancy, antepartum    7  Depression affecting pregnancy    8  BMI 36 0-36 9,adult    9  Insulin controlled gestational diabetes mellitus (GDM) in third trimester  Comments:  32 weeks  nst biweekly weekly  tolu,   growth scan every 4 weeks,  DW pt fetal kick counts   pt has scheduled    TDAP today       Subjective:     CC: Prenatal care    Amarilys Barber is a 34 y o  Pauleen Libel female who presents for routine prenatal care at 30w3d  Pregnancy ROS: no  leakage of fluid, pelvic pain, or vaginal bleeding   +  fetal movement      The following portions of the patient's history were reviewed and updated as appropriate: allergies, current medications, past family history, past medical history, obstetric history, gynecologic history, past social history, past surgical history and problem list       Objective:  /68 (BP Location: Left arm, Patient Position: Sitting, Cuff Size: Standard)   Ht 5' 3" (1 6 m)   Wt 96 2 kg (212 lb)   LMP 01/15/2022   BMI 37 55 kg/m²   Pregravid Weight/BMI: 86 2 kg (190 lb) (BMI 33 67)  Current Weight: 96 2 kg (212 lb)   Total Weight Gain: 9 979 kg (22 lb)   Pre- Vitals    Flowsheet Row Most Recent Value   Prenatal Assessment    Prenatal Vitals    Blood Pressure 114/68   Weight - Scale 96 2 kg (212 lb)   Urine Albumin/Glucose    Dilation/Effacement/Station    Vaginal Drainage Edema            General: Well appearing, no distress  Respiratory: Unlabored breathing  Cardiovascular: Regular rate  Abdomen: Soft, gravid, nontender  Fundal Height: Appropriate for gestational age  Extremities: Warm and well perfused  Non tender

## 2022-08-30 NOTE — PATIENT INSTRUCTIONS
The Third Trimester  (28-42 weeks)  YOUR BABY   * your baby sucks its thumb now! * your baby can hear voices and respond to touch   so talk to him or her!!   * your babys brain grows and develops most in the last 2 months of pregnancy   * babys head and bones are soft and flexible so they can fit through the birth canal   * babys movements change towards the end of pregnancy because there is less room for kicking and stretching in your belly   * babys lungs are not fully developed and completely ready to breathe on their own until the last 3-4 weeks before your due date    YOUR BODY   * your belly is growing a lot now   * it may become more difficult to sleep well at night or to be as active as you usually are   * you may sweat more than usual   * you will become more off-balancebe careful not to fall!!   * you may develop hemorrhoids (which can be painful and make it difficult to sit down)   * the last two months of pregnancy can become very uncomfortablewith backaches, headaches, and heartburn   * you can start to have contractions  as long as they are irregular and less than 5 per hour, this is a normal part of your body getting ready to have a baby   * your cervix may start to thin out and open upto get ready for delivery   * you may find yourself needing to pee very often  because baby is pressing on your bladder so much   * you may get out of breathe more quickly than usual      FETAL KICK COUNTS    In the third trimester (after 28 weeks gestation) you should be performing fetal kick counts every day  Your baby should move at least 10 times in 2 hours during an active time, once a day  Choose atime of day when your baby is most active  Try to do this around the same time each day  Get into a comfortable position and then write down the time your baby first moves  Count each movement until the baby moves 10 times  These movements include kicks, punches, nudges, flutters, or rolls    This can take anywhere from 5 minutes to 2 hours  Write down the time you feel the baby's 10th movement  If 2 hours has passed and your baby has not moved at least 10 times, you should CALL THE OFFICE RIGHT AWAY  [phone]          PREMATURE LABOR     When to call [phone]:  * I need to call immediately if I have even a small amount of LIQUID leaking from my vagina, with or without contractions  * I need to call if I am BLEEDING from my vagina  * I need to call if I am feeling CRAMPING that continues after drinking 2-3 glasses of water and lying down on my side for one hour and that feels like I am having a period  * I need to call if I feel CONTRACTIONS  more than 4 times in an hour that feels like the baby is balling up even after I try drinking 2-3 glasses of water and lying down on my side for an hour  * I need to call if I notice a change in my vaginal DISCHARGE  * I need to call if I am feeling PELVIC PRESSURE  that feels like the baby is pushing down into my vagina and lasts more than an hour  * I need to call if I have LOW BACKACHE which is new and near my tailbone  It may either come and go several times during an hour or stay there constantly  PRE-ECLAMPSIA     What is it? Pre-eclampsia is a serious disease that can occur during pregnancy related to high blood pressures  It can happen to any woman  Why should I care? Women who develop pre-eclampsia have serious risks which can include seizures, stroke, organ damage, premature birth of their baby  In the very worst cases, it can cause death of the mother and/or their baby  What should I pay attention to?    Signs and symptoms of pre-eclampsia can include:   * Severe swelling of face or hands    * A headache that will not go away even after you have taken Tylenol   * Seeing spots or changes in eyesight    * Pain in the upper abdomen or shoulder    * New nausea and vomiting (in the second half of pregnancy)    * Sudden weight gain    * Difficulty breathing     What should I do? If you experience any of the above symptoms of pre-eclampsia, contact your OB provider  Finding pre-eclampsia early is important for you and your baby  Call us       BREASTFEEDING     BENEFITS FOR BABIES   * stronger immune systems (less allergies, eczema, asthma, and childhood cancers)   * less diarrhea and constipation or other GI diseases   * fewer colds and ear infections   * better vision and teeth (fewer cavities)   * improves IQ   * lower rates of diabetes and obesity in childhood     BENEFITS FOR MOMS   * promotes faster weight loss after delivery   * lower risk for postpartum depression   * lower risk for breast, uterine, and ovarian cancers   * lower risk for osteoporosis developing with age   * easier than formula - is always right with you, clean, and the right temperature   * less expensive than formulaits FREE !!!!     KEYS TO SUCCESSFUL BREASTFEEDING   * keep baby skin-to-skin until after first feeding event   * keep baby in your room with you during your hospital stay after delivery   * avoid any bottle feedings (unless medically necessary)   * limit the use of pacifiers and swaddling   * ask for help if you are having any issueslactation consultants (who specialize in breastfeeding) are available to help you   * a healthy diet for momeating a variety of foods and portions in moderation    THINGS YOU SHOULD KNOW ABOUT BREASTFEEDING   * most medications are considered compatible with breastfeeding by the [place] [employer], but you should check with your health care provider or lactation consultant prior to taking a new medicationjust to be sure it is safe   * alcohol (beer, wine, liquor) can be passed from mother to baby through breast milkan occasional, social drink is deemed acceptable by the [employer] [place] [employer]   more than that should be avoided   * breastfeeding is NOT an effective method of birth control   * nicotine (in cigarettes) can pass from mother to baby through breast milk   however, for mothers who smoke, it is still healthier to breastfeed than use formula   * caffeine should be limited to 1-3 cups per dayincludes coffee, soda, energy drinks         PERINEAL / VAGINAL MASSAGE    What can I do now to decrease my chances of tearing during delivery? Massaging around the vaginal opening by you (or your partner), either antepartum (before birth) or during the second stage of labor, can reduce the likelihood of perineal tearing during childbirth  Likewise, the use of warm packs held on the perineum during the pushing stage of labor can reduce the severity of your tear  This will happen during the pushing stage of labor  At home, you can also help reduce the chances of injury that may occur during the birth of your child through perineal massage  When should I do this? Starting around or shortly after 34 weeks of pregnancy, you or your partner should provide 5-10 minutes of vaginal massage 1-4 times per week  How? Use either almond, coconut, or olive oil and water mixture on 1 or 2 fingers (depending on comfort)  Insert finger(s) 3-5cm into the vagina  Apply sweeping downward/sideward pressure from 3 to 9 o'clock for 5-10 minutes, 1-4 times per week  WARNING SIGNS DURING PREGNANCY  Call our office at [phone] if you experience any of the followin  Vaginal bleeding  2  Sharp abdominal pain that does not go away  3  Fever (more than 100 4 and is not relieved by Tylenol)  4  Persistent vomiting lasting greater than 24 hours  5  Chest pain   6  Pain or burning when you urinate  7  Severe headache that doesn't resolve with Tylenol  8  Blurred vision or seeing spots in your vision  9  Sudden swelling of your face or hands  10  Redness, swelling or pain in a leg  11  A sudden weight gain in just a few days  12   Decrease in your baby's movement (after 28 weeks or the 6th month of pregnancy)  13  A loss of watery fluid from your vagina - can be a gush, a trickle or continuous wetness  14  After 20 weeks of pregnancy, rhythmic cramping (greater than 4 per hour) or menstrual like low/pelvic pain          VACCINES IN PREGNANCY    TDAP  Whopping cough (or pertusSsis) can be serious for anyone, but for your , it can be life-threatning  Up to 20 babies die each year in the [place]  Due to whopping cough  About half of babies younger than 3year old who get whopping cough need treatment in the hospital   The younger the baby is when he or she gets whopping cough, the more likely he or she will need to be treated in a hospital   When you receive the whopping cough vaccine (Tdap) during your pregnancy, your body will create protective antibodies and pass some of them to your baby before birth  These antibodies can help protect your baby from getting whopping cough until they are old enough to be vaccinated themselves (usually around 7 months of age)  INFLUENZA  Changes in your immune, heart, and lung functions during pregnancy make you more likely to get seriously ill from the flu  Catching the flu also increases your chances for serious problems for your developing baby, including premature labor and delivery  It is recommended that all women who are pregnant during flu season should receive an influenza vaccine

## 2022-08-30 NOTE — PROGRESS NOTES
Routine Prenatal Visit  [address] [facility], [address], [facility], [address]    Assessment/Plan:  Lxex Villalba is a 34y o  year old  at 28w3d who presents for routine prenatal visit  1  Rh incompatibility  -     Rho(D) immune globulin (RHOGAM ULTRA-FILTERED PLUS) IM injection 300 mcg    2  Pregnancy complicated by pre-existing type 2 diabetes in third trimester    3  Hyperglycemia in pregnancy    4  28 weeks gestation of pregnancy  -     POCT urine dip    5  Rh negative status during pregnancy in second trimester    6  Obesity affecting pregnancy, antepartum    7  Depression affecting pregnancy          Subjective:     CC: Prenatal care    Fadia Chatterjee is a 34 y o   female who presents for routine prenatal care at 28w3d  Pregnancy ROS: no leakage of fluid, pelvic pain, or vaginal bleeding  normal fetal movement  The following portions of the patient's history were reviewed and updated as appropriate: allergies, current medications, past family history, past medical history, obstetric history, gynecologic history, past social history, past surgical history and problem list       Objective:  /60   Wt 93 4 kg (205 lb 12 8 oz)   LMP 01/15/2022   BMI 36 46 kg/m²   Pregravid Weight/BMI: 86 2 kg (190 lb) (BMI 33 25)  Current Weight: 93 4 kg (205 lb 12 8 oz)   Total Weight Gain: 7 167 kg (15 lb 12 8 oz)   Pre-Sofia Vitals    Flowsheet Row Most Recent Value   Prenatal Assessment    Movement Present   Prenatal Vitals    Blood Pressure 110/60   Weight - Scale 93 4 kg (205 lb 12 8 oz)   Urine Albumin/Glucose    Dilation/Effacement/Station    Vaginal Drainage    Edema            General: Well appearing, no distress  Respiratory: Unlabored breathing  Cardiovascular: Regular rate  Abdomen: Soft, gravid, nontender  Fundal Height: Appropriate for gestational age  Extremities: Warm and well perfused  Non tender

## 2022-09-06 ENCOUNTER — OFFICE VISIT (OUTPATIENT)
Dept: SLEEP CENTER | Facility: HOSPITAL | Age: 32
End: 2022-09-06
Payer: COMMERCIAL

## 2022-09-06 VITALS
HEART RATE: 91 BPM | HEIGHT: 63 IN | SYSTOLIC BLOOD PRESSURE: 110 MMHG | WEIGHT: 212 LBS | BODY MASS INDEX: 37.56 KG/M2 | OXYGEN SATURATION: 98 % | DIASTOLIC BLOOD PRESSURE: 70 MMHG

## 2022-09-06 DIAGNOSIS — G47.19 EXCESSIVE DAYTIME SLEEPINESS: ICD-10-CM

## 2022-09-06 DIAGNOSIS — F41.9 ANXIETY AND DEPRESSION: ICD-10-CM

## 2022-09-06 DIAGNOSIS — O99.810 HYPERGLYCEMIA IN PREGNANCY: ICD-10-CM

## 2022-09-06 DIAGNOSIS — F32.A ANXIETY AND DEPRESSION: ICD-10-CM

## 2022-09-06 DIAGNOSIS — O99.210 OBESITY AFFECTING PREGNANCY, ANTEPARTUM: ICD-10-CM

## 2022-09-06 DIAGNOSIS — F45.8 BRUXISM: ICD-10-CM

## 2022-09-06 DIAGNOSIS — R53.82 CHRONIC FATIGUE: ICD-10-CM

## 2022-09-06 DIAGNOSIS — R06.83 SNORING: Primary | ICD-10-CM

## 2022-09-06 PROCEDURE — 99204 OFFICE O/P NEW MOD 45 MIN: CPT | Performed by: INTERNAL MEDICINE

## 2022-09-06 NOTE — PROGRESS NOTES
Consultation - Sleep Center   [facility]  34 y o  female  GL  IIJ:[id number]  DOS:2022    Physician Requesting Consult: Rashmi Whittaker DO             Reason for Consult : At your kind request I saw THE [facility] for initial sleep evaluation today  The patient is here to evaluate for suspected Obstructive Sleep Apnea  PFSH, Problem List, Medications & Allergies were reviewed in EMR  Hyun Giang  has a past medical history of Chronic fatigue and Depression  She has a current medication list which includes the following prescription(s): accu-chek guide, accu-chek softclix lancets, aspirin, basaglar kwikpen, accu-chek guide me, insulin aspart, insulin pen needle, magnesium, metformin, prenatal mv-min-fe fum-fa-dha, and sertraline  HPI:  She presents with complaints of fatigue and snoring that precedes her 31 week pregnancy  Symptoms have escalated in the past few months  Snoring disturbs others and at times she awakens herself with snoring, but is not aware of breathing difficulties during sleep  Other Complaints: none  Restless Leg Syndrome: reports no suggestive symptoms  Parasomnia: reports teeth grinding during sleep;   Sleep Routine (on average):   Typical Bedtime:  9:30 p m  Gets OOB:  6:15 a m  TIB:>  8 5 hrs  Sleep latency:<  30 minutes Sleep Interruptions:1-2/nite [  struggles to fall back asleep]  Awakens: needing an alarm  Upon awakening: never feels rested   She estimates getting 6 hrs sleep  Hyun Giang reports Excessive Daytime Sleepiness  takes planned naps for at least an hour  She rated herself at Total score: 5 /24 on the [place] Sleepiness Scale  Habits:  reports that she quit smoking about 3 years ago  Her smoking use included cigarettes  She has a 5 00 pack-year smoking history   She has never used smokeless tobacco  ;   E-Cigarette/Vaping    E-Cigarette Use Never User     ;  reports no history of drug use ;  reports previous alcohol use of about 2 0 standard drinks of alcohol per week  ; Caffeine use:limited ; Exercise routine: none   Family History: Negative for sleep disturbance  ROS - reviewed and as attached  Significant for 20 lb weight gain during her pregnancy  She reported no nasal, respiratory or cardiac symptoms  She has acid reflux that is worse at night  She has musculoskeletal aches and pains  She feels mood is stable on Zoloft  EXAM:  /70 (BP Location: Left arm, Cuff Size: Adult)   Pulse 91   Ht 5' 3" (1 6 m)   Wt 96 2 kg (212 lb)   LMP 01/15/2022   SpO2 98%   BMI 37 55 kg/m²    General: Well groomed female, well appearing, in no apparent distress  Neurological: Alert and orientated;  cooperative; Cranial nerves intact;    Psychiatric: Speech:clear and coherent;  Normal mood, affect & thought   Skin: warm and dry; Color& Hydration good; no facial rashes or lesions   HEENT:  Craniofacial anatomy: normal Sinuses: non- tender  TMJ: Normal    Eyes: EOM's intact;  conjunctiva/corneas clear   Ears: Externallyappear normal     Nasal Airway: is patent Septum:intact; Mucosa: normal; Turbinates: normal; Rhinorrhea: None   Mouth: Lips: normal posture; Dentition: normal   Mucosa:moist  ; Hard Palate:normal    Oropharryx: crowdedTongue: Mallampati:Class IV, Mobile and ScallopedSoft Palate:  redundant  Tonsils: absent  Neck:; Neck Circumference: 15 5 "; Supple; no abnormal masses; Thyroid:normal  Trachea:central     Lymph: No Cervical or Submandibular Lymhadenopathy  Heart: S1,S2 normal; RRR; no gallop; no murmurs   Lungs: Respiratory Effort:normal  Air entry good bilaterally  No wheezes  No rales  Abdomen: Obese, Soft & non-tender    Extremities: No pedal edema  No clubbing or cyanosis  Musculoskeletal:  Motor normal; Gait:normal        IMPRESSION: Primary/Secondary Sleep Diagnoses (to Medical or Psych conditions) & Comorbidities   1  Snoring  Ambulatory Referral to Sleep Medicine    Home Study   2   Chronic fatigue Ambulatory Referral to Sleep Medicine   3  Excessive daytime sleepiness  Ambulatory Referral to Sleep Medicine    Home Study   4  Bruxism     5  Anxiety and depression     6  Obesity affecting pregnancy, antepartum     7  Hyperglycemia in pregnancy          PLAN:   With respect to above conditions, comprehensive counseling provided on pathophysiology; effects on symptoms and comorbidities, diagnostic strategies & limitations; treatment options; risks or no treament; risks & benefits of the various therapeutic options; costs and insurance aspects  I advised weight reduction, avoiding sleeping supine, using alcohol or sedating medications close to bed time and on safe driving practices  Nocturnal polysomnography is indicated and a diagnostic study will be scheduled  Patient is willing to try Positive airway pressure therapy and will be scheduled for a titration study if indicated  Follow-up to be scheduled after the studies to review results, further details of treatment options and to initiate/adjust therapy  Sincerely,      Authenticated electronically on 53/68/58   Board Certified Specialist     Portions of the record may have been created with voice recognition software  Occasional wrong word or "sound a like" substitutions may have occurred due to the inherent limitations of voice recognition software  There may also be notations and random deletions of words or characters from malfunctioning software  Read the chart carefully and recognize, using context, where substitutions/deletions have occurred

## 2022-09-06 NOTE — PATIENT INSTRUCTIONS
What is CHELSEA? Obstructive sleep apnea is a common and serious sleep disorder that causes you to stop breathing during sleep  The airway repeatedly becomes blocked, limiting the amount of air that reaches your lungs  When this happens, you may snore loudly or making choking noises as you try to breathe  Your brain and body becomes oxygen deprived and you may wake up  This may happen a few times a night, or in more severe cases, several hundred times a night  Sleep apnea can make you wake up in the morning feeling tired or unrefreshed even though you have had a full night of sleep  During the day, you may feel fatigued, have difficulty concentrating or you may even unintentionally fall asleep  This is because your body is waking up numerous times throughout the night, even though you might not be conscious of each awakening  The lack of oxygen your body receives can have negative long-term consequences for your health  This includes:  High blood pressure  Heart disease  Irregular heart rhythms  Stroke  Pre-diabetes and diabetes  Depression    Testing  An objective evaluation of your sleep may be needed before your board certified sleep physician can make a diagnosis  Options include:   In-lab overnight sleep study  This type of sleep study requires you to stay overnight at a sleep center, in a bed that may resemble a hotel room  You will sleep with sensors hooked up to various parts of your body  These sensors record your brain waves, heartbeat, breathing and movement  An overnight sleep study also provides your doctor with the most complete information about your sleep  Learn more about an overnight sleep study  Home sleep apnea test  Some patients with high risk factors for obstructive sleep apnea and no other medical disorders may be candidates for a home sleep apnea test  The testing equipment differs in that it is less complicated than what is used in an overnight sleep study   As such, does not give all the data an in-lab will and if negative, may not mean you do not have the problem  Treatment for sleep apnea  includes using a continuous positive airway pressure (CPAP) machine to keep your airway open during sleep  A mask is placed over your nose and mouth, or just your nose  The mask is hooked to the CPAP machine that blows a gentle stream of air into the mask when you breathe  This helps keep your airway open so you can breathe more regularly  Extra oxygen may be given to you through the machine  You may be given a mouth device  It looks like a mouth guard or dental retainer and stops your tongue and mouth tissues from blocking your throat while you sleep  Surgery may be needed to remove extra tissues that block your mouth, throat, or nose  Manage sleep apnea:   Do not smoke  Nicotine and other chemicals in cigarettes and cigars can cause lung damage  Ask your healthcare provider for information if you currently smoke and need help to quit  E-cigarettes or smokeless tobacco still contain nicotine  Talk to your healthcare provider before you use these products  Do not drink alcohol or take sedative medicine before you go to sleep  Alcohol and sedatives can relax the muscles and tissues around your throat  This can block the airflow to your lungs  Maintain a healthy weight  Excess tissue around your throat may restrict your breathing  Ask your healthcare provider for information if you need to lose weight  Sleep on your side or use pillows designed to prevent sleep apnea  This prevents your tongue or other tissues from blocking your throat  You can also raise the head of your bed  Driving Safety  Refrain from driving when drowsy  Follow up with your healthcare provider as directed:  Write down your questions so you remember to ask them during your visits  Go to AASM website for more information: Sleepeducation  org     What is CHELSEA?    Obstructive sleep apnea is a common and serious sleep disorder that causes you to stop breathing during sleep  The airway repeatedly becomes blocked, limiting the amount of air that reaches your lungs  When this happens, you may snore loudly or making choking noises as you try to breathe  Your brain and body becomes oxygen deprived and you may wake up  This may happen a few times a night, or in more severe cases, several hundred times a night  Sleep apnea can make you wake up in the morning feeling tired or unrefreshed even though you have had a full night of sleep  During the day, you may feel fatigued, have difficulty concentrating or you may even unintentionally fall asleep  This is because your body is waking up numerous times throughout the night, even though you might not be conscious of each awakening  The lack of oxygen your body receives can have negative long-term consequences for your health  This includes:  High blood pressure  Heart disease  Irregular heart rhythms  Stroke  Pre-diabetes and diabetes  Depression    Testing  An objective evaluation of your sleep may be needed before your board certified sleep physician can make a diagnosis  Options include:   In-lab overnight sleep study  This type of sleep study requires you to stay overnight at a sleep center, in a bed that may resemble a hotel room  You will sleep with sensors hooked up to various parts of your body  These sensors record your brain waves, heartbeat, breathing and movement  An overnight sleep study also provides your doctor with the most complete information about your sleep  Learn more about an overnight sleep study  Home sleep apnea test  Some patients with high risk factors for obstructive sleep apnea and no other medical disorders may be candidates for a home sleep apnea test  The testing equipment differs in that it is less complicated than what is used in an overnight sleep study   As such, does not give all the data an in-lab will and if negative, may not mean you do not have the problem  Treatment for sleep apnea  includes using a continuous positive airway pressure (CPAP) machine to keep your airway open during sleep  A mask is placed over your nose and mouth, or just your nose  The mask is hooked to the CPAP machine that blows a gentle stream of air into the mask when you breathe  This helps keep your airway open so you can breathe more regularly  Extra oxygen may be given to you through the machine  You may be given a mouth device  It looks like a mouth guard or dental retainer and stops your tongue and mouth tissues from blocking your throat while you sleep  Surgery may be needed to remove extra tissues that block your mouth, throat, or nose  Manage sleep apnea:   Do not smoke  Nicotine and other chemicals in cigarettes and cigars can cause lung damage  Ask your healthcare provider for information if you currently smoke and need help to quit  E-cigarettes or smokeless tobacco still contain nicotine  Talk to your healthcare provider before you use these products  Do not drink alcohol or take sedative medicine before you go to sleep  Alcohol and sedatives can relax the muscles and tissues around your throat  This can block the airflow to your lungs  Maintain a healthy weight  Excess tissue around your throat may restrict your breathing  Ask your healthcare provider for information if you need to lose weight  Sleep on your side or use pillows designed to prevent sleep apnea  This prevents your tongue or other tissues from blocking your throat  You can also raise the head of your bed  Driving Safety  Refrain from driving when drowsy  Follow up with your healthcare provider as directed:  Write down your questions so you remember to ask them during your visits  Go to AAS website for more information: Sleepeducation  org       Nursing Support:  When: Monday through Friday 7A-5PM except holidays  Where: Our direct line is [phone]      If you are having a true emergency please call 911  In the event that the line is busy or it is after hours please leave a voice message and we will return your call  Please speak clearly, leaving your full name, birth date, best number to reach you and the reason for your call  Medication refills: We will need the name of the medication, the dosage, the ordering provider, whether you get a 30 or 90 day refill, and the pharmacy name and address  Medications will be ordered by the provider only  Nurses cannot call in prescriptions  Please allow 7 days for medication refills  Physician requested updates: If your provider requested that you call with an update after starting medication, please be ready to provide us the medication and dosage, what time you take your medication, the time you attempt to fall asleep, time you fall asleep, when you wake up, and what time you get out of bed  Sleep Study Results: We will contact you with sleep study results and/or next steps after the physician has reviewed your testing

## 2022-09-09 ENCOUNTER — DOCUMENTATION (OUTPATIENT)
Dept: PERINATAL CARE | Facility: CLINIC | Age: 32
End: 2022-09-09

## 2022-09-09 NOTE — PROGRESS NOTES
Date: 09/09/22  Ambrocio Dykes  1990     Estimated Date of Delivery: 11/5/22  GA: 31w6d  OB/GYN: Jeremy ZHANG    GDM in pregnancy method of control unspecified and Insulin controlled GDM  Additional Pregnancy Complications: Obesity, History of prediabetes with A1c 6 3%    Blood Sugar Log:         Review of Blood Sugar Log:   · FBG: Not well controlled; Consistently elevated >90mg/dl  · PPBG: Mostly well controlled; occasionally elevated though not consistently elevated at this time    PLAN    MEDICATION: (Insulin; Basaglar at 9:30pm once daily + Novolog w/ Meals) + (Metformin; with Breakfast + Dinner)  *Split Dose Instructions: 2 injects each injected one after another into 2 different sites spaced 2 to 3 inches apart    (9/9) per Rhonda Multani RD  · Increase Basaglar to 68 units (split dose = 2 injects: each 34 units)  · Continue 1000mg Meformin at [facility] + 500mg Metformin with Breakfast (inform diabetes team of tolerance of dose)  · Continue Novolog Regimen: (hold if not eating)  · 18 units before breakfast   · 24 units before lunch  · 22 units before dinner     (8/30) per SARAHI Gonsalez  · Increase Novolog to 18-24-22-0 before meals 1-2-3 (hold if not eating)    (8/23) per [address]  · Increase Novolog to 15-20-18-0 before meals 1-2-3 (hold if not eating)    (8/16) per [employer] [facility]  · Increase Novolog to 12-15-15-0 before meals 1-2-3  · Add 500mg Metformin w/ Breakfast + Continue 1000mg with dinner  · Continue 62 units Basaglar (split dose) at 9:30pm    (8/12) per Ayaka Nicolas RD CDE  · Continue 1000mg Metformin w/ Dinner  · Advised pt to inform diabetes team of how she is tolerating the dose -- so that may possibly increase next review  · Increase Basaglar to 62 units (split dose)  · Novolog:   · Increase to 6 units before breakfast   · Continue 10 units before lunch  · Increase to 10 units at dinner    (8/2) per SARAHI Gonsalez  · Increase Basaglar or Lantus to 56 units (split dose)  · Start Novolog 4 units before breakfast  · Increase Novolog before lunch to 10 units   · Continue Novolog 8 units before dinner  · Continue 1000mg Metformin w/ Dinner (pt confirmed starting 8/2)    (7/25) per Moises Credit, Venkat Velazquez  · Advised to increase Metformin to 1000mg with dinner if remembering to take it and tolerating well   · Asked for pts thoughts regarding starting 4 units Novolog before breakfast    (7/12) per Moises Credit, CRNP  · Increase to 48 units (split dose)  · Continue 500mg Metformin w/ Dinner    (7/13) per Pt Message  · Reports started Metformin on 7/6    (6/28) per Moises Credit, CRNP  · Continue 40 units   · Start 500mg Metformin w/ Dinner (to be titrated weekly as tolerated up to total of 2000mg a day)    (6/24) per Moises Credit, CRNP  · Increased to 40 units daily (split dose)    (6/14) per [address]  · Increased to 36 units  · Discussed pt possibly needing meal time insulin -- pt states she "would like to hold off on the bolus for at least another week or two" and "try to do better with the diet"    (6/10) per Moises Credit, CRNP  · Increased to 30 units    (5/31) per Ekta Reynolds RN  · Increased to 26 units     (5/31) Pt reported - started 22 units Basaglar     (5/24) perMerlyn Petit RD CDE  · Advised pt to begin 22 units Basaglar at 9 or 10pm once daily     DIET: GDM Meal Plan (3 meals and 3 snacks)  · Continue Assigned GDM Meal Plan: 2100 calorie     (9/9) per Sandip Bradshaw RD CDE  · Request pt keep a 3 food log to discuss at f/up visit with Cynthia Hernandez on 9/13    (8/12) per PAULO James  · Requested pt keep a 3 day food log to discuss at f/up visit    (6/24) per Moises CreditSARAHI  · Requested pt keep a 3 day food log    BLOOD SUGAR MONITORING (Meter: Accu-chek Guide Me)  · Continue testing blood sugars: 4 x per day (Fasting, 2 hour after start of each meal)     (8/16) per Moises Credit CRNP  · Requested pt report every Monday and Thursday for Insulin Adjustments    (8/10) MyChart Message From Pt      (6/24) per Venkat Henry  · Requested pt check blood sugar between 2-4am until next appointment    (5/26 + 5/31) per Aiyana Sherman, RN  · Encouraged pt to add units of insulin to flowsheet (add to dinner column)    PHYSICAL ACTIVITY:   · Continue walking 30 minutes daily, following OB recommendations  Support System: Significant Other / family     Patient Goal: "I will plan my meals and snacks every day"     Education:  Class 2 completed with Tammie Grant on 05/24/2022      Visits w/ CRNP:  5/10 + 6/28 + 8/16  Next CRNP Visit: 9/13/22    Weight Change:   Pre-gravid weight: 86 2 kg (190 lb)  9 979 kg (22 lb) TWG  Weight gain recommendations: BMI (> 30) 11-20 lbs    Ultrasounds  8/9/22 US; fetal growth normal AC: 89% EFW: 78%  HC: 55%  Next US 9/13/22    Labs  Lab Results   Component Value Date    [id number] 149 (H) 04/05/2022     Lab Results   Component Value Date    GLUF 123 (H) 05/03/2022    [id number] 120 05/03/2022     Lab Results   Component Value Date    HGBA1C 6 4 (H) 08/15/2022     (8/12) per Brooks Garcia RD CDE  · Patient reminded to completed A1c active    (8/15) A1C completed = 6 4    (9/9) per Serafin Rudd RD CDE  · Informed pt of active CMP order and encouraged pt to complete at earliest convenience    Further fetal surveillance  Beginning at 32 weeks, NST / HUMBERTO twice a week    Serafin Rudd RD   Diabetes Education

## 2022-09-13 ENCOUNTER — ROUTINE PRENATAL (OUTPATIENT)
Dept: OBGYN CLINIC | Facility: CLINIC | Age: 32
End: 2022-09-13

## 2022-09-13 ENCOUNTER — TELEPHONE (OUTPATIENT)
Dept: PERINATAL CARE | Facility: CLINIC | Age: 32
End: 2022-09-13

## 2022-09-13 ENCOUNTER — DOCUMENTATION (OUTPATIENT)
Dept: PERINATAL CARE | Facility: CLINIC | Age: 32
End: 2022-09-13

## 2022-09-13 ENCOUNTER — ULTRASOUND (OUTPATIENT)
Dept: PERINATAL CARE | Facility: OTHER | Age: 32
End: 2022-09-13
Payer: COMMERCIAL

## 2022-09-13 VITALS
WEIGHT: 213.2 LBS | SYSTOLIC BLOOD PRESSURE: 129 MMHG | HEART RATE: 97 BPM | DIASTOLIC BLOOD PRESSURE: 84 MMHG | BODY MASS INDEX: 37.78 KG/M2 | HEIGHT: 63 IN

## 2022-09-13 VITALS — WEIGHT: 212 LBS | SYSTOLIC BLOOD PRESSURE: 108 MMHG | BODY MASS INDEX: 37.55 KG/M2 | DIASTOLIC BLOOD PRESSURE: 60 MMHG

## 2022-09-13 DIAGNOSIS — O24.113 PREGNANCY COMPLICATED BY PRE-EXISTING TYPE 2 DIABETES IN THIRD TRIMESTER: Primary | ICD-10-CM

## 2022-09-13 DIAGNOSIS — Z67.91 RH NEGATIVE STATUS DURING PREGNANCY IN THIRD TRIMESTER: ICD-10-CM

## 2022-09-13 DIAGNOSIS — O36.63X0 EXCESSIVE FETAL GROWTH AFFECTING MANAGEMENT OF PREGNANCY IN THIRD TRIMESTER, SINGLE OR UNSPECIFIED FETUS: ICD-10-CM

## 2022-09-13 DIAGNOSIS — Z3A.32 32 WEEKS GESTATION OF PREGNANCY: ICD-10-CM

## 2022-09-13 DIAGNOSIS — O26.893 RH NEGATIVE STATUS DURING PREGNANCY IN THIRD TRIMESTER: ICD-10-CM

## 2022-09-13 DIAGNOSIS — O99.210 OBESITY AFFECTING PREGNANCY, ANTEPARTUM: ICD-10-CM

## 2022-09-13 LAB
SL AMB  POCT GLUCOSE, UA: 250
SL AMB POCT URINE PROTEIN: NEGATIVE

## 2022-09-13 PROCEDURE — 59025 FETAL NON-STRESS TEST: CPT | Performed by: OBSTETRICS & GYNECOLOGY

## 2022-09-13 PROCEDURE — 99214 OFFICE O/P EST MOD 30 MIN: CPT | Performed by: OBSTETRICS & GYNECOLOGY

## 2022-09-13 PROCEDURE — 76816 OB US FOLLOW-UP PER FETUS: CPT | Performed by: OBSTETRICS & GYNECOLOGY

## 2022-09-13 RX ORDER — CALCIUM CARBONATE 200(500)MG
2 TABLET,CHEWABLE ORAL DAILY
COMMUNITY
End: 2022-10-28

## 2022-09-13 NOTE — LETTER
NST sleeve cover sheet    Patient name: Tim Sandoval  : 1990  MRN: [id number]    KALEN: Estimated Date of Delivery: 22    Obstetrician: ______________[place]__________    Reason(s) for testing:  ____________Pre-gest DM_______________      Testing frequency:    _x_ 2x/wk  ___ 1x/wk  ___ Dopplers  ___ BPP?       Last growth scan: __________________________________________

## 2022-09-13 NOTE — PROGRESS NOTES
Non-Stress Testing:    Non-Stress test, equipment, procedure, and expected outcomes reviewed  Reviewed fetal kick counts and when to call OB  Cheikh Amos Verified patient understanding of fetal kick counts with teach back method  Patient reports feeling daily fetal movements  Patient has no questions or concerns

## 2022-09-13 NOTE — PROGRESS NOTES
Routine Prenatal Visit  [address] [facility], [address], [facility], [address]    Assessment/Plan:  Samina Garrison is a 34y o  year old  at [address] who presents for routine prenatal visit  1  Pregnancy complicated by pre-existing type 2 diabetes in third trimester  Assessment & Plan:  Fetal testing ongoing  Trying to get glucose under optimal control  Lab Results   Component Value Date    HGBA1C 6 4 (H) 08/15/2022         2  Obesity affecting pregnancy, antepartum    3  Rh negative status during pregnancy in third trimester    4  32 weeks gestation of pregnancy  -     POCT urine dip        Subjective:     CC: Prenatal care    Mariah Rudd is a 34 y o   female who presents for routine prenatal care at [address] [facility]  Pregnancy ROS: no leakage of fluid, pelvic pain, or vaginal bleeding  normal fetal movement  The following portions of the patient's history were reviewed and updated as appropriate: allergies, current medications, past family history, past medical history, obstetric history, gynecologic history, past social history, past surgical history and problem list       Objective:  /60   Wt 96 2 kg (212 lb)   LMP 01/15/2022   BMI 37 55 kg/m²   Pregravid Weight/BMI: 86 2 kg (190 lb) (BMI 33 67)  Current Weight: 96 2 kg (212 lb)   Total Weight Gain: 10 5 kg (23 lb 3 2 oz)   Pre- Vitals    Flowsheet Row Most Recent Value   Prenatal Assessment    Fetal Heart Rate 140   Fundal Height (cm) 33 cm   Movement Present   Presentation Vertex   Prenatal Vitals    Blood Pressure 108/60   Weight - Scale 96 2 kg (212 lb)   Urine Albumin/Glucose    Dilation/Effacement/Station    Vaginal Drainage    Edema            General: Well appearing, no distress  Respiratory: Unlabored breathing  Cardiovascular: Regular rate  Abdomen: Soft, gravid, nontender  Fundal Height: Appropriate for gestational age  Extremities: Warm and well perfused  Non tender

## 2022-09-13 NOTE — PROGRESS NOTES
Date: 09/13/22  Ambrocio Dykes  1990     Estimated Date of Delivery: 11/5/22  GA: 32w3d  OB/GYN: Jeremy ZHANG    GDM in pregnancy method of control unspecified and Insulin controlled GDM  Additional Pregnancy Complications: Obesity, History of prediabetes with A1c 6 3%    Blood Sugar Log:           PLAN    MEDICATION: (Insulin; Basaglar at 9:30pm once daily + Novolog w/ Meals) + (Metformin; with Breakfast + Dinner)  *Split Dose Instructions: 2 injects each injected one after another into 2 different sites spaced 2 to 3 inches apart    Mychart message from Dr Kindra Gonzalez 9/13/22 to diabetes team:     (9/9) per Rhonda Multani RD  · Increase Basaglar to 68 units (split dose = 2 injects: each 34 units)  · Continue 1000mg Meformin at [address] + 500mg Metformin with Breakfast (inform diabetes team of tolerance of dose)  · Continue Novolog Regimen: (hold if not eating)  · 18 units before breakfast   · 24 units before lunch  · 22 units before dinner     (8/30) per [address]  · Increase Novolog to 18-24-22-0 before meals 1-2-3 (hold if not eating)    (8/23) per [address] [employer]  · Increase Novolog to 15-20-18-0 before meals 1-2-3 (hold if not eating)    (8/16) per [address]  · Increase Novolog to 12-15-15-0 before meals 1-2-3  · Add 500mg Metformin w/ Breakfast + Continue 1000mg with dinner  · Continue 62 units Basaglar (split dose) at 9:30pm    (8/12) per Ayaka Nicolas RD CDE  · Continue 1000mg Metformin w/ Dinner  · Advised pt to inform diabetes team of how she is tolerating the dose -- so that may possibly increase next review  · Increase Basaglar to 62 units (split dose)  · Novolog:   · Increase to 6 units before breakfast   · Continue 10 units before lunch  · Increase to 10 units at dinner    (8/2) per SARAHI Vuong  · Increase Basaglar or Lantus to 56 units (split dose)  · Start Novolog 4 units before breakfast  · Increase Novolog before lunch to 10 units   · Continue Novolog 8 units before dinner  · Continue 1000mg Metformin w/ Dinner (pt confirmed starting 8/2)    (7/25) per Venkat Kohler  · Advised to increase Metformin to 1000mg with dinner if remembering to take it and tolerating well   · Asked for pts thoughts regarding starting 4 units Novolog before breakfast    (7/12) per SARAHI Kohler  · Increase to 48 units (split dose)  · Continue 500mg Metformin w/ Dinner    (7/13) per Pt Message  · Reports started Metformin on 7/6    (6/28) per SARAHI Kohler  · Continue 40 units   · Start 500mg Metformin w/ Dinner (to be titrated weekly as tolerated up to total of 2000mg a day)    (6/24) per SARAHI Kohler  · Increased to 40 units daily (split dose)    (6/14) per [address]  · Increased to 36 units  · Discussed pt possibly needing meal time insulin -- pt states she "would like to hold off on the bolus for at least another week or two" and "try to do better with the diet"    (6/10) per SARAHI Kohler  · Increased to 30 units    (5/31) per Velia Buerger, RN  · Increased to 26 units     (5/31) Pt reported - started 22 units Basaglar     (5/24) perMerlyn Petit RD CDE  · Advised pt to begin 22 units Basaglar at 9 or 10pm once daily     DIET: GDM Meal Plan (3 meals and 3 snacks)  · Continue Assigned GDM Meal Plan: 2100 calorie     (9/9) per Jeff Silver RD CDE  · Request pt keep a 3 food log to discuss at f/up visit with Ivette Be on 9/13    (8/12) per Gilma Crabtree RD CDE  · Requested pt keep a 3 day food log to discuss at f/up visit    (6/24) per SARAHI Kohler  · Requested pt keep a 3 day food log    BLOOD SUGAR MONITORING (Meter: Accu-chek Guide Me)  · Continue testing blood sugars: 4 x per day (Fasting, 2 hour after start of each meal)     (8/16) per SARAHI Kohler  · Requested pt report every Monday and Thursday for Insulin Adjustments    (8/10) MyChart Message From Pt      (6/24) per SARAHI Kohler  · Requested pt check blood sugar between 2-4am until next appointment    (5/26 + 5/31) per Velia Buerger, RN  · Encouraged pt to add units of insulin to flowsheet (add to dinner column)    PHYSICAL ACTIVITY:   · Continue walking 30 minutes daily, following OB recommendations  Support System: Significant Other / family     Patient Goal: "I will plan my meals and snacks every day"     Education:  Class 2 completed with Elza Joshi on 05/24/2022  Visits w/ CRNP:  5/10 + 6/28 + 8/16 9/13/22 patient did not connect to virtual visit  Requested Lidia Sagastume contact patient to reschedule this appointment  Weight Change:   Pre-gravid weight: 86 2 kg (190 lb)  10 5 kg (23 lb 3 2 oz) TWG  Weight gain recommendations: BMI (> 30) 11-20 lbs    Ultrasounds  8/9/22 US; fetal growth normal AC: 89% EFW: 78%  HC: 55%  9/13/22 US: AC>97%, EFW 96% and HUMBERTO normal Dr Quincy Alejandro adjusted patient's insulin doses at ultrasound appointment today     Next US scheduled 10/4/22    Labs  Lab Results   Component Value Date    [id number] 149 (H) 04/05/2022     Lab Results   Component Value Date    GLUF 123 (H) 05/03/2022    [id number] 120 05/03/2022     Lab Results   Component Value Date    HGBA1C 6 4 (H) 08/15/2022       (9/9) per Jeff Silver RD CDE  · Informed pt of active CMP order and encouraged pt to complete at earliest convenience    Further fetal surveillance  Beginning at 32 weeks, NST / HUMBERTO twice a week    Catrachita Epps RD,LDN,CDE   Diabetes Education

## 2022-09-14 LAB
CHOLEST SERPL-MCNC: 197 MG/DL (ref 100–199)
CHOLEST/HDLC SERPL: 3.7 RATIO (ref 0–4.4)
HDLC SERPL-MCNC: 53 MG/DL
LDLC SERPL CALC-MCNC: 89 MG/DL (ref 0–99)
LDLC SERPL DIRECT ASSAY-MCNC: 80 MG/DL (ref 0–99)
SL AMB VLDL CHOLESTEROL CALC: 55 MG/DL (ref 5–40)
TRIGL SERPL-MCNC: 339 MG/DL (ref 0–149)

## 2022-09-16 ENCOUNTER — ROUTINE PRENATAL (OUTPATIENT)
Dept: PERINATAL CARE | Facility: OTHER | Age: 32
End: 2022-09-16
Payer: COMMERCIAL

## 2022-09-16 VITALS
BODY MASS INDEX: 38.45 KG/M2 | HEIGHT: 63 IN | DIASTOLIC BLOOD PRESSURE: 64 MMHG | WEIGHT: 217 LBS | HEART RATE: 97 BPM | SYSTOLIC BLOOD PRESSURE: 108 MMHG

## 2022-09-16 DIAGNOSIS — Z3A.32 32 WEEKS GESTATION OF PREGNANCY: ICD-10-CM

## 2022-09-16 DIAGNOSIS — O36.63X0 EXCESSIVE FETAL GROWTH AFFECTING MANAGEMENT OF PREGNANCY IN THIRD TRIMESTER, SINGLE OR UNSPECIFIED FETUS: ICD-10-CM

## 2022-09-16 DIAGNOSIS — O24.113 PREGNANCY COMPLICATED BY PRE-EXISTING TYPE 2 DIABETES IN THIRD TRIMESTER: Primary | ICD-10-CM

## 2022-09-16 PROCEDURE — 59025 FETAL NON-STRESS TEST: CPT | Performed by: NURSE PRACTITIONER

## 2022-09-16 NOTE — PROGRESS NOTES
[id number] [facility]: Ms Yudy Maradiaga was seen today for NST (found under the pregnancy episode) which I reviewed the RN assessment and agree  NST is reactive without decelerations    Please don't hesitate to contact our office with any concerns or questions   -SARAHI Soni

## 2022-09-16 NOTE — PATIENT INSTRUCTIONS
Thank you for choosing us for your  care today  If you have any questions about your ultrasound or care, please do not hesitate to contact us or your primary obstetrician  Some general instructions for your pregnancy are:    Protect against coronavirus: get vaccinated - pregnant women are increased risk of severe COVID  Notify your primary care doctor if you have any symptoms  Exercise: Aim for 22 minutes per day (150 minutes per week) of regular exercise  Walking is great! Nutrition: aim for calcium-rich and iron-rich foods as well as healthy sources of protein  Learn about Preeclampsia: preeclampsia is a common, serious high blood pressure complication in pregnancy  A blood pressure of 797JQHN (systolic or top number) or 11NVIN (diastolic or bottom number) is not normal and needs evaluation by your doctor  Aspirin is sometimes prescribed in early pregnancy to prevent preeclampsia in women with risk factors - ask your obstetrician if you should be on this medication  If you smoke, try to reduce how many cigarettes you smoke or try to quit completely  Do not vape  Other warning signs to watch out for in pregnancy or postpartum: chest pain, obstructed breathing or shortness of breath, seizures, thoughts of hurting yourself or your baby, bleeding, a painful or swollen leg, fever, or headache (see AWHONN POST-BIRTH Warning Signs campaign)  If these happen call 911  Itching is also not normal in pregnancy and if you experience this, especially over your hands and feet, potentially worse at night, notify your doctors

## 2022-09-16 NOTE — LETTER
Shanda Guidry  MRN: [id number] MRN: [id number] MRN: [id number]  : [birth year]  KALEN/GA: KALEN/GA: KALEN/GA:  Date:  Date:  Date:     Arlette Salas  MRN: [id number] MRN: [id number] MRN: [id number]  : [birth year]  KALEN/GA: KALEN/GA: KALEN/GA:  Date:  Date:  Date:     Arlette Salas  MRN: [id number] MRN: [id number] MRN: [id number]  : [birth year]  KALEN/GA: KALEN/GA: KALEN/GA:  Date:  Date:  Date:     Arlette Salas  MRN: [id number] MRN: [id number] MRN: [id number]  : [birth year] : 1990  KALEN/GA: KALEN/GA: KALEN/GA:  Date:  Date:  Date:     Arlette Salas  MRN: [id number] MRN: [id number] MRN: [id number]  : [birth year] : 1990  KALEN/GA: KALEN/GA: KALEN/GA:  Date:  Date:  Date:     Arlette Salas  MRN: [id number] MRN: [id number] MRN: [id number]  : [birth year] : 1990  KALEN/GA: KALEN/GA: KALEN/GA:  Date:  Date:  Date:     Arlette Salas  MRN: [id number] MRN: [id number] MRN: [id number]  : [birth year] : 1990  KALEN/GA: KALEN/GA: KALEN/GA:  Date:  Date:  Date:

## 2022-09-16 NOTE — LETTER
NST sleeve cover sheet    Patient name: Joao Vera  : 1990  MRN: [id number]    KALEN: Estimated Date of Delivery: 22    Obstetrician: _____________[place]_________    Reason(s) for testing:  _________________Pre-gest DM_______________      Testing frequency:    _x_ 2x/wk  ___ 1x/wk  ___ Dopplers  ___ BPP?       Last growth scan: __________________________________________

## 2022-09-18 NOTE — PROGRESS NOTES
Please refer to the [facility] ultrasound report in Ob Procedures for additional information regarding today's visit

## 2022-09-20 ENCOUNTER — PATIENT MESSAGE (OUTPATIENT)
Dept: PERINATAL CARE | Facility: CLINIC | Age: 32
End: 2022-09-20

## 2022-09-20 ENCOUNTER — ULTRASOUND (OUTPATIENT)
Dept: PERINATAL CARE | Facility: OTHER | Age: 32
End: 2022-09-20
Payer: COMMERCIAL

## 2022-09-20 ENCOUNTER — DOCUMENTATION (OUTPATIENT)
Dept: PERINATAL CARE | Facility: CLINIC | Age: 32
End: 2022-09-20

## 2022-09-20 VITALS
HEIGHT: 63 IN | SYSTOLIC BLOOD PRESSURE: 126 MMHG | DIASTOLIC BLOOD PRESSURE: 85 MMHG | HEART RATE: 111 BPM | BODY MASS INDEX: 38.66 KG/M2 | WEIGHT: 218.2 LBS

## 2022-09-20 DIAGNOSIS — Z3A.33 33 WEEKS GESTATION OF PREGNANCY: ICD-10-CM

## 2022-09-20 DIAGNOSIS — O24.113 PREGNANCY COMPLICATED BY PRE-EXISTING TYPE 2 DIABETES IN THIRD TRIMESTER: Primary | ICD-10-CM

## 2022-09-20 PROCEDURE — 76815 OB US LIMITED FETUS(S): CPT | Performed by: OBSTETRICS & GYNECOLOGY

## 2022-09-20 PROCEDURE — 59025 FETAL NON-STRESS TEST: CPT | Performed by: OBSTETRICS & GYNECOLOGY

## 2022-09-20 NOTE — LETTER
September 20, 2022     MD Emerson [address]on [address] [facility] [address]  [facility]    Patient: Paty Pope   YOB: 1990   Date of Visit: 9/20/2022       Dear Dr Judy Aguilar: Thank you for referring Paty Pope to me for evaluation  Below are my notes for this consultation  If you have questions, please do not hesitate to call me  I look forward to following your patient along with you  Sincerely,        Drake Barron MD        CC: No Recipients  Drake Barron MD  9/18/2022 10:10 AM  Sign when Signing Visit  Please refer to the [facility] ultrasound report in Ob Procedures for additional information regarding today's visit

## 2022-09-20 NOTE — PROGRESS NOTES
Date: 09/20/22  Maxx Ochoa  1990     Estimated Date of Delivery: 11/5/22  GA: 33w3d  OB/GYN: [facility]    Insulin controlled GDM     Additional Pregnancy Complications: Obesity, History of prediabetes with A1c 6 3%    Blood Sugar Log:       PLAN    MEDICATION:   · Basaglar--Increase form 72 to 82 units & split into 2 doses of 41 units each at 9:30 PM  · Metformin--500 mg with breakfast & 1000 mg with dinner  · Continue Novolog Regimen: (hold if not eating)  · Increase from 20 to 22 units before breakfast   · 24 units before lunch  · Increase from 20 to 22 units before dinner     DIET: GDM Meal Plan (3 meals and 3 snacks)  ·  2100 calorie     Schedule  A Nutrition follow-up appointment soon  BLOOD SUGAR MONITORING (Meter: Accu-chek Guide Me)  · Continue testing blood sugars: 4 x per day (Fasting, 2 hour after start of each meal)     (PHYSICAL ACTIVITY:   · Continue walking 30 minutes daily, following OB recommendations  Support System: Spouse / family     Patient Goal: "I will plan my meals and snacks every day"     Education:  Class 2 completed with Chuckie Suazo on 05/24/2022  Visits w/ CRNP:  5/10 + 6/28 + 8/16    Weight Change:   Pre-gravid weight: 86 2 kg (190 lb)  12 8 kg (28 lb 3 2 oz) TWG  Weight gain recommendations: BMI (> 30) 11-20 lbs    Ultrasounds  9/13/22 US: AC>97%, EFW 96% and HUMBERTO normal   Next US scheduled 10/4/22    Labs  Lab Results   Component Value Date    [id number] 149 (H) 04/05/2022     Lab Results   Component Value Date    GLUF 123 (H) 05/03/2022    [id number] 120 05/03/2022     Lab Results   Component Value Date    HGBA1C 6 4 (H) 08/15/2022     Advised to complete the CMP test soon       Further fetal surveillance  Beginning at 32 weeks, NST / HUMBERTO twice a week    Date to report next:  Weekly    KWAME Santamaria,LDN,CDE   Diabetes Education  Diabetes & Pregnancy Program

## 2022-09-21 ENCOUNTER — TELEPHONE (OUTPATIENT)
Dept: PERINATAL CARE | Facility: CLINIC | Age: 32
End: 2022-09-21

## 2022-09-23 ENCOUNTER — ROUTINE PRENATAL (OUTPATIENT)
Dept: PERINATAL CARE | Facility: OTHER | Age: 32
End: 2022-09-23
Payer: COMMERCIAL

## 2022-09-23 VITALS
HEIGHT: 63 IN | HEART RATE: 86 BPM | BODY MASS INDEX: 38.8 KG/M2 | DIASTOLIC BLOOD PRESSURE: 84 MMHG | SYSTOLIC BLOOD PRESSURE: 134 MMHG | WEIGHT: 219 LBS

## 2022-09-23 DIAGNOSIS — Z3A.33 33 WEEKS GESTATION OF PREGNANCY: ICD-10-CM

## 2022-09-23 DIAGNOSIS — O24.113 PREGNANCY COMPLICATED BY PRE-EXISTING TYPE 2 DIABETES IN THIRD TRIMESTER: Primary | ICD-10-CM

## 2022-09-23 PROCEDURE — 59025 FETAL NON-STRESS TEST: CPT | Performed by: NURSE PRACTITIONER

## 2022-09-23 PROCEDURE — 99212 OFFICE O/P EST SF 10 MIN: CPT | Performed by: NURSE PRACTITIONER

## 2022-09-23 NOTE — PATIENT INSTRUCTIONS
Thank you for choosing us for your  care today  If you have any questions about your ultrasound or care, please do not hesitate to contact us or your primary obstetrician  Some general instructions for your pregnancy are:    Protect against coronavirus: get vaccinated - pregnant women are increased risk of severe COVID  Notify your primary care doctor if you have any symptoms  Exercise: Aim for 22 minutes per day (150 minutes per week) of regular exercise  Walking is great! Nutrition: aim for calcium-rich and iron-rich foods as well as healthy sources of protein  Learn about Preeclampsia: preeclampsia is a common, serious high blood pressure complication in pregnancy  A blood pressure of 642NSSQ (systolic or top number) or 48GNEV (diastolic or bottom number) is not normal and needs evaluation by your doctor  Aspirin is sometimes prescribed in early pregnancy to prevent preeclampsia in women with risk factors - ask your obstetrician if you should be on this medication  If you smoke, try to reduce how many cigarettes you smoke or try to quit completely  Do not vape  Other warning signs to watch out for in pregnancy or postpartum: chest pain, obstructed breathing or shortness of breath, seizures, thoughts of hurting yourself or your baby, bleeding, a painful or swollen leg, fever, or headache (see AWHONN POST-BIRTH Warning Signs campaign)  If these happen call 911  Itching is also not normal in pregnancy and if you experience this, especially over your hands and feet, potentially worse at night, notify your doctors

## 2022-09-25 PROBLEM — Z3A.34 34 WEEKS GESTATION OF PREGNANCY: Status: ACTIVE | Noted: 2022-05-10

## 2022-09-26 ENCOUNTER — ULTRASOUND (OUTPATIENT)
Dept: PERINATAL CARE | Facility: OTHER | Age: 32
End: 2022-09-26
Payer: COMMERCIAL

## 2022-09-26 VITALS
SYSTOLIC BLOOD PRESSURE: 119 MMHG | WEIGHT: 220.4 LBS | HEIGHT: 63 IN | BODY MASS INDEX: 39.05 KG/M2 | DIASTOLIC BLOOD PRESSURE: 84 MMHG | HEART RATE: 98 BPM

## 2022-09-26 DIAGNOSIS — O24.113 PREGNANCY COMPLICATED BY PRE-EXISTING TYPE 2 DIABETES IN THIRD TRIMESTER: Primary | ICD-10-CM

## 2022-09-26 DIAGNOSIS — Z3A.34 34 WEEKS GESTATION OF PREGNANCY: ICD-10-CM

## 2022-09-26 PROCEDURE — 76815 OB US LIMITED FETUS(S): CPT | Performed by: OBSTETRICS & GYNECOLOGY

## 2022-09-26 PROCEDURE — 59025 FETAL NON-STRESS TEST: CPT | Performed by: OBSTETRICS & GYNECOLOGY

## 2022-09-26 NOTE — PROGRESS NOTES
I reviewed the nursing documentation of this NST and agree with the above  See OB procedures for ultrasound report of mild polyhydramnios on HUMEBRTO       Zita Krishnamurthy MD

## 2022-09-26 NOTE — ASSESSMENT & PLAN NOTE
Fetal testing ongoing   Trying to get glucose under optimal control  Lab Results   Component Value Date    HGBA1C 6 4 (H) 08/15/2022

## 2022-09-26 NOTE — PATIENT INSTRUCTIONS
Kick Counts in Pregnancy   WHAT YOU NEED TO KNOW:   What do I need to know about kick counts? Kick counts measure how much your baby is moving in your womb  A kick from your baby can be felt as a twist, turn, swish, roll, or jab  It is common to feel your baby kicking at 26 to 28 weeks of pregnancy  You may feel your baby kick as early as 20 weeks of pregnancy  You may want to start counting at 28 weeks  Why should I measure kick counts? Your baby's movement may provide information about your baby's health  He or she may move less, or not at all, if there are problems  Your baby may move less if he or she is not getting enough oxygen or nutrition from the placenta  Do not smoke while you are pregnant  Smoking decreases the amount of oxygen that gets to your baby  Talk to your healthcare provider if you need help to quit smoking  Tell your healthcare provider as soon as you feel a change in your baby's movements  When do I measure kick counts? Measure kick counts at the same time every day  Measure kick counts when your baby is awake and most active  Your baby may be most active in the evening  How do I measure kick counts? Check that your baby is awake before you measure kick counts  You can wake up your baby by lightly pushing on your belly, walking, or drinking something cold  Your healthcare provider may tell you different ways to measure kick counts  You may be told to do the following:  Use a chart or clock to keep track of the time you start and finish counting  Sit in a chair or lie on your left side  Place your hands on the largest part of your belly  Count until you reach 10 kicks  Write down how much time it takes to count 10 kicks  It may take 30 minutes to 2 hours to count 10 kicks  It should not take more than 2 hours to count 10 kicks  When should I contact my doctor? You feel a change in the number of kicks or movements of your baby       You feel fewer than 10 kicks within 2 hours  You have questions or concerns about your baby's movements  CARE AGREEMENT:   You have the right to help plan your care  Learn about your health condition and how it may be treated  Discuss treatment options with your healthcare providers to decide what care you want to receive  You always have the right to refuse treatment  The above information is an  only  It is not intended as medical advice for individual conditions or treatments  Talk to your doctor, nurse or pharmacist before following any medical regimen to see if it is safe and effective for you  © Copyright [employer] 2022 Information is for End User's use only and may not be sold, redistributed or otherwise used for commercial purposes  All illustrations and images included in CareNotes® are the copyrighted property of [employer]  or [address] [place] [address] Counts in Pregnancy   WHAT YOU NEED TO KNOW:   What do I need to know about kick counts? Kick counts measure how much your baby is moving in your womb  A kick from your baby can be felt as a twist, turn, swish, roll, or jab  It is common to feel your baby kicking at 26 to 28 weeks of pregnancy  You may feel your baby kick as early as 20 weeks of pregnancy  You may want to start counting at 28 weeks  Why should I measure kick counts? Your baby's movement may provide information about your baby's health  He or she may move less, or not at all, if there are problems  Your baby may move less if he or she is not getting enough oxygen or nutrition from the placenta  Do not smoke while you are pregnant  Smoking decreases the amount of oxygen that gets to your baby  Talk to your healthcare provider if you need help to quit smoking  Tell your healthcare provider as soon as you feel a change in your baby's movements  When do I measure kick counts? Measure kick counts at the same time every day        Measure kick counts when your baby is awake and most active  Your baby may be most active in the evening  How do I measure kick counts? Check that your baby is awake before you measure kick counts  You can wake up your baby by lightly pushing on your belly, walking, or drinking something cold  Your healthcare provider may tell you different ways to measure kick counts  You may be told to do the following:  Use a chart or clock to keep track of the time you start and finish counting  Sit in a chair or lie on your left side  Place your hands on the largest part of your belly  Count until you reach 10 kicks  Write down how much time it takes to count 10 kicks  It may take 30 minutes to 2 hours to count 10 kicks  It should not take more than 2 hours to count 10 kicks  When should I contact my doctor? You feel a change in the number of kicks or movements of your baby  You feel fewer than 10 kicks within 2 hours  You have questions or concerns about your baby's movements  CARE AGREEMENT:   You have the right to help plan your care  Learn about your health condition and how it may be treated  Discuss treatment options with your healthcare providers to decide what care you want to receive  You always have the right to refuse treatment  The above information is an  only  It is not intended as medical advice for individual conditions or treatments  Talk to your doctor, nurse or pharmacist before following any medical regimen to see if it is safe and effective for you  © Copyright [employer] 2022 Information is for End User's use only and may not be sold, redistributed or otherwise used for commercial purposes   All illustrations and images included in CareNotes® are the copyrighted property of A D A M [employer]  or [address] [place] [address] [employer]

## 2022-09-27 ENCOUNTER — ROUTINE PRENATAL (OUTPATIENT)
Dept: OBGYN CLINIC | Facility: CLINIC | Age: 32
End: 2022-09-27

## 2022-09-27 VITALS
SYSTOLIC BLOOD PRESSURE: 118 MMHG | BODY MASS INDEX: 38.84 KG/M2 | WEIGHT: 219.2 LBS | DIASTOLIC BLOOD PRESSURE: 84 MMHG | HEIGHT: 63 IN

## 2022-09-27 DIAGNOSIS — O99.210 OBESITY AFFECTING PREGNANCY, ANTEPARTUM: ICD-10-CM

## 2022-09-27 DIAGNOSIS — O36.63X0 EXCESSIVE FETAL GROWTH AFFECTING MANAGEMENT OF PREGNANCY IN THIRD TRIMESTER, SINGLE OR UNSPECIFIED FETUS: ICD-10-CM

## 2022-09-27 DIAGNOSIS — O24.414 INSULIN CONTROLLED GESTATIONAL DIABETES MELLITUS (GDM) IN SECOND TRIMESTER: Primary | ICD-10-CM

## 2022-09-27 DIAGNOSIS — O24.113 PREGNANCY COMPLICATED BY PRE-EXISTING TYPE 2 DIABETES IN THIRD TRIMESTER: Primary | ICD-10-CM

## 2022-09-27 DIAGNOSIS — F32.A DEPRESSION AFFECTING PREGNANCY: ICD-10-CM

## 2022-09-27 DIAGNOSIS — O99.340 DEPRESSION AFFECTING PREGNANCY: ICD-10-CM

## 2022-09-27 DIAGNOSIS — Z3A.34 34 WEEKS GESTATION OF PREGNANCY: ICD-10-CM

## 2022-09-27 LAB
SL AMB  POCT GLUCOSE, UA: NEGATIVE
SL AMB POCT URINE PROTEIN: NEGATIVE

## 2022-09-27 NOTE — ASSESSMENT & PLAN NOTE
Continue insulin and Metformin  Continue reporting to DIPP  Getting NSTs with MFM  Next growth u/s 2 weeks      Lab Results   Component Value Date    HGBA1C 6 4 (H) 08/15/2022

## 2022-09-27 NOTE — PROGRESS NOTES
Routine Prenatal Visit  [address] [facility], [address], [facility], [address] [facility]    Assessment/Plan:  Qiana Marie is a 34y o  year old  at 34w3d who presents for routine prenatal visit  1  Pregnancy complicated by pre-existing type 2 diabetes in third trimester  Assessment & Plan:  Continue insulin and Metformin  Continue reporting to DIPP  Getting NSTs with MFM  Next growth u/s 2 weeks  Lab Results   Component Value Date    HGBA1C 6 4 (H) 08/15/2022         2  Excessive fetal growth affecting management of pregnancy in third trimester, single or unspecified fetus  Assessment & Plan:  2022 32wks -  2503 grams - 5 lbs 8 oz  (96%),  AC >97%  F/u growth 36 weeks      3  Obesity affecting pregnancy, antepartum  Assessment & Plan:  Stop ASA at 36 weeks      4  Depression affecting pregnancy    5  Lactating mother  -     Breast pump    6  34 weeks gestation of pregnancy  -     POCT urine dip      Next OB Visit 2 weeks  Subjective:     CC: Prenatal care    Joao Vera is a 34 y o   female who presents for routine prenatal care at 34w3d  Pregnancy ROS: no leakage of fluid, pelvic pain, or vaginal bleeding  normal fetal movement      The following portions of the patient's history were reviewed and updated as appropriate: allergies, current medications, past family history, past medical history, obstetric history, gynecologic history, past social history, past surgical history and problem list       Objective:  /84 (BP Location: Left arm, Patient Position: Sitting, Cuff Size: Standard)   Ht 5' 3" (1 6 m)   Wt 99 4 kg (219 lb 3 2 oz)   LMP 01/15/2022   BMI 38 83 kg/m²   Pregravid Weight/BMI: 86 2 kg (190 lb) (BMI 33 67)  Current Weight: 99 4 kg (219 lb 3 2 oz)   Total Weight Gain: 13 2 kg (29 lb 3 2 oz)   Pre- Vitals    Flowsheet Row Most Recent Value   Prenatal Assessment    Fetal Heart Rate 125   Fundal Height (cm) 36 cm   Movement Present   Presentation Vertex   Prenatal Vitals    Blood Pressure 118/84   Weight - Scale 99 4 kg (219 lb 3 2 oz)   Urine Albumin/Glucose    Dilation/Effacement/Station    Vaginal Drainage    Edema    LLE Edema Trace   RLE Edema Trace           General: Well appearing, no distress  Abdomen: Soft, gravid, nontender  Fundal Height: Appropriate for gestational age  Extremities: Warm and well perfused  Non tender

## 2022-09-28 RX ORDER — PEN NEEDLE, DIABETIC 31 GX5/16"
NEEDLE, DISPOSABLE MISCELLANEOUS
Qty: 100 EACH | Refills: 3 | Status: SHIPPED | OUTPATIENT
Start: 2022-09-28

## 2022-09-30 ENCOUNTER — ROUTINE PRENATAL (OUTPATIENT)
Dept: PERINATAL CARE | Facility: OTHER | Age: 32
End: 2022-09-30
Payer: COMMERCIAL

## 2022-09-30 ENCOUNTER — DOCUMENTATION (OUTPATIENT)
Dept: PERINATAL CARE | Facility: CLINIC | Age: 32
End: 2022-09-30

## 2022-09-30 VITALS
HEIGHT: 63 IN | SYSTOLIC BLOOD PRESSURE: 124 MMHG | DIASTOLIC BLOOD PRESSURE: 76 MMHG | WEIGHT: 222.2 LBS | BODY MASS INDEX: 39.37 KG/M2 | HEART RATE: 97 BPM

## 2022-09-30 DIAGNOSIS — O40.3XX0 POLYHYDRAMNIOS AFFECTING PREGNANCY IN THIRD TRIMESTER: ICD-10-CM

## 2022-09-30 DIAGNOSIS — O24.113 PREGNANCY COMPLICATED BY PRE-EXISTING TYPE 2 DIABETES IN THIRD TRIMESTER: Primary | ICD-10-CM

## 2022-09-30 DIAGNOSIS — Z3A.34 34 WEEKS GESTATION OF PREGNANCY: ICD-10-CM

## 2022-09-30 PROCEDURE — 59025 FETAL NON-STRESS TEST: CPT | Performed by: NURSE PRACTITIONER

## 2022-09-30 PROCEDURE — 99212 OFFICE O/P EST SF 10 MIN: CPT | Performed by: NURSE PRACTITIONER

## 2022-09-30 NOTE — PROGRESS NOTES
[id number] [facility]: Ms Joyce Murdock was seen today @ 34w6d  for NST (found under the pregnancy episode) which I reviewed the RN assessment and agree  NST is reactive without decelerations  Reports good fetal movement  Her fastings remain between 98 and 104  Normal 2 hour  PP levels  Her PM insulin dose of basalar is 82 unti (split) and she is taking Novolog 22/24/22 units before meals  Her last dose adjustment was 9/20  Message sent to diabetic team to review  Please don't hesitate to contact our office with any concerns or questions   -Gumaro Soriano      I spent 15 minutes devoted to patient care (5 min chart preparation, 5 minutes face to face and 5 minutes documenting)

## 2022-09-30 NOTE — PATIENT INSTRUCTIONS
Thank you for choosing us for your  care today  If you have any questions about your ultrasound or care, please do not hesitate to contact us or your primary obstetrician  Some general instructions for your pregnancy are:    Protect against coronavirus: get vaccinated - pregnant women are increased risk of severe COVID  Notify your primary care doctor if you have any symptoms  Exercise: Aim for 22 minutes per day (150 minutes per week) of regular exercise  Walking is great! Nutrition: aim for calcium-rich and iron-rich foods as well as healthy sources of protein  Learn about Preeclampsia: preeclampsia is a common, serious high blood pressure complication in pregnancy  A blood pressure of 461YDJN (systolic or top number) or 94ANTF (diastolic or bottom number) is not normal and needs evaluation by your doctor  Aspirin is sometimes prescribed in early pregnancy to prevent preeclampsia in women with risk factors - ask your obstetrician if you should be on this medication  If you smoke, try to reduce how many cigarettes you smoke or try to quit completely  Do not vape  Other warning signs to watch out for in pregnancy or postpartum: chest pain, obstructed breathing or shortness of breath, seizures, thoughts of hurting yourself or your baby, bleeding, a painful or swollen leg, fever, or headache (see AWHONN POST-BIRTH Warning Signs campaign)  If these happen call 911  Itching is also not normal in pregnancy and if you experience this, especially over your hands and feet, potentially worse at night, notify your doctors

## 2022-09-30 NOTE — PROGRESS NOTES
Date: 09/30/22  Nelia Lombard  1990     Estimated Date of Delivery: 11/5/22  GA: 34w6d  OB/GYN: [facility]    Insulin controlled GDM     Additional Pregnancy Complications: Obesity, History of prediabetes with A1c 6 3%    Blood Sugar Log:       PLAN    MEDICATION:   · Basaglar--Increase from 82 to 96 units & split into 4 doses of 24 units each at 9:30 PM  · Metformin--500 mg with breakfast & 1000 mg with dinner  · Continue Novolog Regimen: (hold if not eating)  · 22 units before breakfast   · 24 units before lunch  · 22 units before dinner     DIET: GDM Meal Plan (3 meals and 3 snacks)  ·  2100 calorie     Schedule  A Nutrition follow-up appointment soon  BLOOD SUGAR MONITORING (Meter: Accu-chek Guide Me)  · Continue testing blood sugars: 4 x per day (Fasting, 2 hour after start of each meal)     (PHYSICAL ACTIVITY:   · Continue walking 30 minutes daily, following OB recommendations  Support System: Spouse / family     Patient Goal: "I will plan my meals and snacks every day"     Education:  Class 2 completed with Catrina Blizzard on 05/24/2022      Visits w/ CRNP:  5/10 + 6/28 + 8/16    Weight Change:   Pre-gravid weight: 86 2 kg (190 lb)  14 6 kg (32 lb 3 2 oz) TWG  Weight gain recommendations: BMI (> 30) 11-20 lbs    Ultrasounds  9/13/22 US: AC>97%, EFW 96% and HUMBERTO normal   Next US scheduled 10/4/22    Labs  Lab Results   Component Value Date    [id number] 149 (H) 04/05/2022     Lab Results   Component Value Date    GLUF 123 (H) 05/03/2022    OQWTAMV0OO 120 05/03/2022     Lab Results   Component Value Date    HGBA1C 6 4 (H) 08/15/2022          Further fetal surveillance  Beginning at 32 weeks, NST / HUMBERTO twice a week    Date to report next:  Weekly    Jae Henao RN  Diabetes Education  Diabetes & Pregnancy Program

## 2022-10-03 NOTE — PATIENT INSTRUCTIONS
Thank you for choosing us for your  care today  If you have any questions about your ultrasound or care, please do not hesitate to contact us or your primary obstetrician  Some general instructions for your pregnancy are:    Protect against coronavirus: get vaccinated - pregnant women are increased risk of severe COVID  Notify your primary care doctor if you have any symptoms  Exercise: Aim for 22 minutes per day (150 minutes per week) of regular exercise  Walking is great! Nutrition: aim for calcium-rich and iron-rich foods as well as healthy sources of protein  Learn about Preeclampsia: preeclampsia is a common, serious high blood pressure complication in pregnancy  A blood pressure of 267SNZI (systolic or top number) or 14LEZC (diastolic or bottom number) is not normal and needs evaluation by your doctor  Aspirin is sometimes prescribed in early pregnancy to prevent preeclampsia in women with risk factors - ask your obstetrician if you should be on this medication  If you smoke, try to reduce how many cigarettes you smoke or try to quit completely  Do not vape  Other warning signs to watch out for in pregnancy or postpartum: chest pain, obstructed breathing or shortness of breath, seizures, thoughts of hurting yourself or your baby, bleeding, a painful or swollen leg, fever, or headache (see AWHONN POST-BIRTH Warning Signs campaign)  If these happen call 911  Itching is also not normal in pregnancy and if you experience this, especially over your hands and feet, potentially worse at night, notify your doctors

## 2022-10-04 ENCOUNTER — ULTRASOUND (OUTPATIENT)
Dept: PERINATAL CARE | Facility: OTHER | Age: 32
End: 2022-10-04
Payer: COMMERCIAL

## 2022-10-04 VITALS
SYSTOLIC BLOOD PRESSURE: 128 MMHG | WEIGHT: 222 LBS | HEART RATE: 84 BPM | DIASTOLIC BLOOD PRESSURE: 64 MMHG | HEIGHT: 63 IN | BODY MASS INDEX: 39.34 KG/M2

## 2022-10-04 DIAGNOSIS — O99.210 OBESITY AFFECTING PREGNANCY, ANTEPARTUM: ICD-10-CM

## 2022-10-04 DIAGNOSIS — O24.113 PREGNANCY COMPLICATED BY PRE-EXISTING TYPE 2 DIABETES IN THIRD TRIMESTER: ICD-10-CM

## 2022-10-04 DIAGNOSIS — Z3A.35 35 WEEKS GESTATION OF PREGNANCY: ICD-10-CM

## 2022-10-04 DIAGNOSIS — O40.3XX0 POLYHYDRAMNIOS AFFECTING PREGNANCY IN THIRD TRIMESTER: Primary | ICD-10-CM

## 2022-10-04 DIAGNOSIS — Z36.89 ENCOUNTER FOR ULTRASOUND TO CHECK FETAL GROWTH: ICD-10-CM

## 2022-10-04 PROCEDURE — 99214 OFFICE O/P EST MOD 30 MIN: CPT | Performed by: OBSTETRICS & GYNECOLOGY

## 2022-10-04 PROCEDURE — 76818 FETAL BIOPHYS PROFILE W/NST: CPT | Performed by: OBSTETRICS & GYNECOLOGY

## 2022-10-04 PROCEDURE — 76816 OB US FOLLOW-UP PER FETUS: CPT | Performed by: OBSTETRICS & GYNECOLOGY

## 2022-10-04 NOTE — PROGRESS NOTES
[address]: Ms Etta Arciniega was seen today for NST (found under the pregnancy episode) which I reviewed the RN assessment and agree, and fetal growth ultrasound (see ultrasound report under OB procedures tab)  The time spent on this established patient on the encounter date included 5 minutes previsit service time reviewing records and precharting, 15 minutes face-to-face service time counseling regarding results and coordinating care, and  10 minutes charting, totalling 30 minutes  Please don't hesitate to contact our office with any concerns or questions    Damon Kingston

## 2022-10-04 NOTE — LETTER
2022    [address] [place]  [facility] [address]    Patient: Dev Jacob   YOB: 1990   Date of Visit: 10/4/2022   Gestational age 30w2d   Jasiel Rsoa of this communication: Routine though please note while growth is somewhat better and poly has resolved, AC is still enlarged and she is not entirely adherent  Would not go past 39 weeks for her and if her fastings stay high, would consider [id number]  I copied Fabiola Palomino here  Dear Etta Bearden and Marquis Bello and Fabiola Palomino,    This patient was seen recently in our  office  The content of my evaluation today is in the ultrasound report under "OB Procedures" tab  Please don't hesitate to contact our office with any concerns or questions       Sincerely,      Roe Henderson MD  Attending Physician, Karmen

## 2022-10-05 ENCOUNTER — PATIENT MESSAGE (OUTPATIENT)
Dept: PERINATAL CARE | Facility: CLINIC | Age: 32
End: 2022-10-05

## 2022-10-06 ENCOUNTER — DOCUMENTATION (OUTPATIENT)
Dept: PERINATAL CARE | Facility: CLINIC | Age: 32
End: 2022-10-06

## 2022-10-06 NOTE — PROGRESS NOTES
Date: 10/06/22  Dev Jacob  1990     Estimated Date of Delivery: 11/5/22  GA: 35w5d  OB/GYN: [facility]    Insulin controlled GDM     Additional Pregnancy Complications: Obesity, History of prediabetes with A1c 6 3%    Blood Sugar Log:       PLAN    MEDICATION:   · Basaglar-- 96 units & split into 4 doses of 24 units each at 9:30 PM  · Metformin--500 mg with breakfast & 1000 mg with dinner  · Continue Novolog Regimen: (hold if not eating)  · 22 units before breakfast   · 24 units before lunch  · 22 units before dinner     DIET: GDM Meal Plan (3 meals and 3 snacks)  ·  2100 calorie     Schedule  A Nutrition follow-up appointment soon  BLOOD SUGAR MONITORING (Meter: Accu-chek Guide Me)  · Continue testing blood sugars: 4 x per day (Fasting, 2 hour after start of each meal)     (PHYSICAL ACTIVITY:   · Continue walking 30 minutes daily, following OB recommendations  Support System: Spouse / family     Patient Goal: "I will plan my meals and snacks every day"     Education:  Class 2 completed with Marizol Coleman on 05/24/2022      Visits w/ CRNP:  5/10 + 6/28 + 8/16    Weight Change:   Pre-gravid weight: 86 2 kg (190 lb)  14 5 kg (32 lb) TWG  Weight gain recommendations: BMI (> 30) 11-20 lbs    Ultrasounds  10/4/22 US: AC>97%, EFW 87% (decreased from 96%) and HUMBERTO normal   Next US scheduled Weekly HUMBERTO's    Labs  Lab Results   Component Value Date    [id number] 149 (H) 04/05/2022     Lab Results   Component Value Date    GLUF 123 (H) 05/03/2022    HKYLPDE6FT 120 05/03/2022     Lab Results   Component Value Date    HGBA1C 6 4 (H) 08/15/2022        Further fetal surveillance  Beginning at 32 weeks, NST / HUMBERTO twice a week    Date to report next:  Weekly    Arianna Sims, MS, RD, CDE  Diabetes Educator  Diabetes & Pregnancy Program

## 2022-10-07 ENCOUNTER — ROUTINE PRENATAL (OUTPATIENT)
Dept: PERINATAL CARE | Facility: OTHER | Age: 32
End: 2022-10-07
Payer: COMMERCIAL

## 2022-10-07 VITALS
HEART RATE: 93 BPM | WEIGHT: 223.2 LBS | SYSTOLIC BLOOD PRESSURE: 114 MMHG | HEIGHT: 63 IN | BODY MASS INDEX: 39.55 KG/M2 | DIASTOLIC BLOOD PRESSURE: 72 MMHG

## 2022-10-07 DIAGNOSIS — O99.210 OBESITY AFFECTING PREGNANCY, ANTEPARTUM: ICD-10-CM

## 2022-10-07 DIAGNOSIS — O24.113 PREGNANCY COMPLICATED BY PRE-EXISTING TYPE 2 DIABETES IN THIRD TRIMESTER: Primary | ICD-10-CM

## 2022-10-07 DIAGNOSIS — Z3A.35 35 WEEKS GESTATION OF PREGNANCY: ICD-10-CM

## 2022-10-07 PROCEDURE — 59025 FETAL NON-STRESS TEST: CPT | Performed by: OBSTETRICS & GYNECOLOGY

## 2022-10-10 ENCOUNTER — ULTRASOUND (OUTPATIENT)
Dept: PERINATAL CARE | Facility: OTHER | Age: 32
End: 2022-10-10
Payer: COMMERCIAL

## 2022-10-10 ENCOUNTER — TELEPHONE (OUTPATIENT)
Dept: PERINATAL CARE | Facility: CLINIC | Age: 32
End: 2022-10-10

## 2022-10-10 VITALS
HEIGHT: 63 IN | DIASTOLIC BLOOD PRESSURE: 88 MMHG | HEART RATE: 113 BPM | SYSTOLIC BLOOD PRESSURE: 132 MMHG | BODY MASS INDEX: 40.04 KG/M2 | WEIGHT: 226 LBS

## 2022-10-10 DIAGNOSIS — O36.63X0 EXCESSIVE FETAL GROWTH AFFECTING MANAGEMENT OF PREGNANCY IN THIRD TRIMESTER, SINGLE OR UNSPECIFIED FETUS: ICD-10-CM

## 2022-10-10 DIAGNOSIS — O24.113 PREGNANCY COMPLICATED BY PRE-EXISTING TYPE 2 DIABETES IN THIRD TRIMESTER: Primary | ICD-10-CM

## 2022-10-10 DIAGNOSIS — Z3A.36 36 WEEKS GESTATION OF PREGNANCY: ICD-10-CM

## 2022-10-10 PROCEDURE — 76815 OB US LIMITED FETUS(S): CPT | Performed by: OBSTETRICS & GYNECOLOGY

## 2022-10-10 PROCEDURE — 59025 FETAL NON-STRESS TEST: CPT | Performed by: OBSTETRICS & GYNECOLOGY

## 2022-10-10 RX ORDER — INSULIN GLARGINE 100 [IU]/ML
96 INJECTION, SOLUTION SUBCUTANEOUS
Qty: 87.36 ML | Refills: 0
Start: 2022-10-10 | End: 2023-01-09

## 2022-10-10 RX ORDER — INSULIN ASPART 100 [IU]/ML
INJECTION, SOLUTION INTRAVENOUS; SUBCUTANEOUS
Qty: 45 ML | Refills: 0
Start: 2022-10-10

## 2022-10-10 NOTE — LETTER
October 10, 2022     Whit Hooper, [facility]  [address] [address]  [facility] [address]    Patient: Hakeem Ly   YOB: 1990   Date of Visit: 10/10/2022       Dear Dr Clif Perez: Thank you for referring Hakeem Ly to me for evaluation  Below are my notes for this consultation  If you have questions, please do not hesitate to call me  I look forward to following your patient along with you  Sincerely,        Jasmyn Brar RN        CC: No Recipients  Krystal Carbone MD  10/10/2022  8:41 AM  Sign when Signing Visit  NSt is reactive   Krystal Carbone

## 2022-10-10 NOTE — PROGRESS NOTES
NST was done today  Pt  Reported active fetal movement at home between visit  Daily fetal kick count reviewed and emphasized  Patient verbalized understanding of all and was receptive      Laurita Mall

## 2022-10-10 NOTE — TELEPHONE ENCOUNTER
----- Message from Sony Rahman sent at 10/6/2022  3:38 PM EDT -----  Regarding: Schedule a Nutrition Follow-up appointment with this patient  KAREN Oliver,  Please call this patient again to schedule a follow-up appointment with her again  Thanks!   Claude Loh

## 2022-10-10 NOTE — PATIENT INSTRUCTIONS
Kick Counts in Pregnancy   WHAT YOU NEED TO KNOW:   What do I need to know about kick counts? Kick counts measure how much your baby is moving in your womb  A kick from your baby can be felt as a twist, turn, swish, roll, or jab  It is common to feel your baby kicking at 26 to 28 weeks of pregnancy  You may feel your baby kick as early as 20 weeks of pregnancy  You may want to start counting at 28 weeks  Why should I measure kick counts? Your baby's movement may provide information about your baby's health  He or she may move less, or not at all, if there are problems  Your baby may move less if he or she is not getting enough oxygen or nutrition from the placenta  Do not smoke while you are pregnant  Smoking decreases the amount of oxygen that gets to your baby  Talk to your healthcare provider if you need help to quit smoking  Tell your healthcare provider as soon as you feel a change in your baby's movements  When do I measure kick counts? Measure kick counts at the same time every day  Measure kick counts when your baby is awake and most active  Your baby may be most active in the evening  How do I measure kick counts? Check that your baby is awake before you measure kick counts  You can wake up your baby by lightly pushing on your belly, walking, or drinking something cold  Your healthcare provider may tell you different ways to measure kick counts  You may be told to do the following:  Use a chart or clock to keep track of the time you start and finish counting  Sit in a chair or lie on your left side  Place your hands on the largest part of your belly  Count until you reach 10 kicks  Write down how much time it takes to count 10 kicks  It may take 30 minutes to 2 hours to count 10 kicks  It should not take more than 2 hours to count 10 kicks  When should I contact my doctor? You feel a change in the number of kicks or movements of your baby        You feel fewer than 10 kicks within 2 hours  You have questions or concerns about your baby's movements  CARE AGREEMENT:   You have the right to help plan your care  Learn about your health condition and how it may be treated  Discuss treatment options with your healthcare providers to decide what care you want to receive  You always have the right to refuse treatment  The above information is an  only  It is not intended as medical advice for individual conditions or treatments  Talk to your doctor, nurse or pharmacist before following any medical regimen to see if it is safe and effective for you  © Copyright [employer] 2022 Information is for End User's use only and may not be sold, redistributed or otherwise used for commercial purposes   All illustrations and images included in CareNotes® are the copyrighted property of A D A M , Inc  or [address] [place] [address] Name band;

## 2022-10-11 ENCOUNTER — TELEPHONE (OUTPATIENT)
Dept: OBGYN CLINIC | Facility: CLINIC | Age: 32
End: 2022-10-11

## 2022-10-11 ENCOUNTER — ROUTINE PRENATAL (OUTPATIENT)
Dept: OBGYN CLINIC | Facility: CLINIC | Age: 32
End: 2022-10-11

## 2022-10-11 VITALS
DIASTOLIC BLOOD PRESSURE: 82 MMHG | BODY MASS INDEX: 39.69 KG/M2 | WEIGHT: 224 LBS | HEIGHT: 63 IN | SYSTOLIC BLOOD PRESSURE: 134 MMHG

## 2022-10-11 DIAGNOSIS — O36.63X0 EXCESSIVE FETAL GROWTH AFFECTING MANAGEMENT OF PREGNANCY IN THIRD TRIMESTER, SINGLE OR UNSPECIFIED FETUS: ICD-10-CM

## 2022-10-11 DIAGNOSIS — Z67.91 RH NEGATIVE STATUS DURING PREGNANCY IN THIRD TRIMESTER: ICD-10-CM

## 2022-10-11 DIAGNOSIS — Z23 FLU VACCINE NEED: ICD-10-CM

## 2022-10-11 DIAGNOSIS — Z3A.36 36 WEEKS GESTATION OF PREGNANCY: Primary | ICD-10-CM

## 2022-10-11 DIAGNOSIS — O26.893 RH NEGATIVE STATUS DURING PREGNANCY IN THIRD TRIMESTER: ICD-10-CM

## 2022-10-11 DIAGNOSIS — F32.A DEPRESSION AFFECTING PREGNANCY: ICD-10-CM

## 2022-10-11 DIAGNOSIS — O24.113 PREGNANCY COMPLICATED BY PRE-EXISTING TYPE 2 DIABETES IN THIRD TRIMESTER: ICD-10-CM

## 2022-10-11 DIAGNOSIS — Z36.85 ENCOUNTER FOR ANTENATAL SCREENING FOR STREPTOCOCCUS B: ICD-10-CM

## 2022-10-11 DIAGNOSIS — O99.810 HYPERGLYCEMIA IN PREGNANCY: ICD-10-CM

## 2022-10-11 DIAGNOSIS — O99.340 DEPRESSION AFFECTING PREGNANCY: ICD-10-CM

## 2022-10-11 DIAGNOSIS — O99.210 OBESITY AFFECTING PREGNANCY, ANTEPARTUM: ICD-10-CM

## 2022-10-11 LAB
SL AMB  POCT GLUCOSE, UA: NORMAL
SL AMB POCT URINE PROTEIN: NORMAL

## 2022-10-11 NOTE — PATIENT INSTRUCTIONS
LABOR PRECAUTIONS  Call our office at [phone] for any of the following:    * I need to call immediately I if I have even a small amount of LIQUID  leaking from my vagina, with or without contractions  * I need to call if I am BLEEDING an amount equal to or more than a period  A small amount of bloody vaginal discharge is normal at the end of the pregnancy  * I need to call if I am having CONTRACTIONS  every five minutes for at least an hour  I will need a watch in order to time them  I should time them from the beginning of one contraction until the beginning of the next one  * I need to call BEFORE  I go to the hospital    * I need to have a plan for TRANSPORTATION  to get to the hospital when I am in labor  Labor is generally not an emergency which requires an ambulance  FETAL KICK COUNTS    In the third trimester (after 28 weeks gestation) you should be performing fetal kick counts every day  Your baby should move at least 10 times in 2 hours during an active time, once a day  Choose atime of day when your baby is most active  Try to do this around the same time each day  Get into a comfortable position and then write down the time your baby first moves  Count each movement until the baby moves 10 times  These movements include kicks, punches, nudges, flutters, or rolls  This can take anywhere from 5 minutes to 2 hours  Write down the time you feel the baby's 10th movement  If 2 hours has passed and your baby has not moved at least 10 times, you should CALL THE OFFICE RIGHT AWAY  [phone]        PERINEAL / VAGINAL MASSAGE    What can I do now to decrease my chances of tearing during delivery? Massaging around the vaginal opening by you (or your partner), either antepartum (before birth) or during the second stage of labor, can reduce the likelihood of perineal tearing during childbirth    Likewise, the use of warm packs held on the perineum during the pushing stage of labor can reduce the severity of your tear  This will happen during the pushing stage of labor  At home, you can also help reduce the chances of injury that may occur during the birth of your child through perineal massage  When should I do this? Starting around or shortly after 34 weeks of pregnancy, you or your partner should provide 5-10 minutes of vaginal massage 1-4 times per week  How? Use either almond, coconut, or olive oil and water mixture on 1 or 2 fingers (depending on comfort)  Insert finger(s) 3-5cm into the vagina  Apply sweeping downward/sideward pressure from 3 to 9 o'clock for 5-10 minutes, 1-4 times per week  GROUP B STREP    Group B Strep (GBS) is a common vaginal bacteria  Approximately 25% of women normally have GBS bacteria present in the vagina  It is NOT a sexually-transmitted infection  In fact, it is not an infection AT ALL! It is just a normal vaginal bacteria for many women  However, the GBS bacteria can be dangerous to a  baby if the baby is exposed to that particular bacteria during labor and birth AND develops an infection from it  The likelihood of a  GBS infection for a woman who has GBS bacteria in the vagina is about 1%-2%  But if it does occur, a baby could become severely ill  For this reason, we do a vaginal culture (Q-tip swab of the vagina and rectum) for ALL pregnant women at approximately 36 weeks of pregnancy  If the culture shows that there is GBS bacteria present, it is NOT a reason to panic! Because in this situation we will give this woman antibiotics through her IV while she is in labor  When a mother is treated with antibiotics during labor and delivery, her baby ALMOST NEVER becomes infected with GBS bacteria

## 2022-10-11 NOTE — TELEPHONE ENCOUNTER
Received & fax [employer] breast pump order  Sent completed forms to [facility],to be placed in pt records

## 2022-10-11 NOTE — PROGRESS NOTES
Routine Prenatal Visit  [address], [address], [facility], [address] [facility]    Assessment/Plan:  Qiana Marie is a 34y o  year old  at 43w3d who presents for routine prenatal visit  1  36 weeks gestation of pregnancy  -     POCT urine dip    2  Encounter for  screening for Streptococcus B  -     Strep B DNA probe, amplification    3  Pregnancy complicated by pre-existing type 2 diabetes in third trimester    4  Hyperglycemia in pregnancy    5  Rh negative status during pregnancy in third trimester    6  Obesity affecting pregnancy, antepartum    7  Depression affecting pregnancy    8  Excessive fetal growth affecting management of pregnancy in third trimester, single or unspecified fetus    9  Flu vaccine need  -     influenza vaccine, quadrivalent, 0 5 mL, preservative-free, for adult and pediatric patients 6 mos+ (AFLURIA, FLUARIX, FLULAVAL, FLUZONE)      + fm   No utcx  Rh neg   Plans t breast feed  Flu shot today  biweekly  nst  And  Weekly tolu   Due on  10/14  Pt states  BP   Doing fine at home  + stress today  No  HA, RUQ pain   Group B done today  And consent  MFM   Consult reviewed  Induction at  39 weeks  Scheduled for  10/30 at 8 pm   Reviewed S+s of PIH,  PROM   And labor  Subjective:     CC: Prenatal care    Joao Vera is a 34 y o  Winsome Jose Antonio female who presents for routine prenatal care at 36w3d  Pregnancy ROS: no  leakage of fluid, pelvic pain, or vaginal bleeding   +  fetal movement      The following portions of the patient's history were reviewed and updated as appropriate: allergies, current medications, past family history, past medical history, obstetric history, gynecologic history, past social history, past surgical history and problem list       Objective:  /90 (BP Location: Right arm, Patient Position: Sitting, Cuff Size: Standard)   Ht 5' 3" (1 6 m)   Wt 102 kg (224 lb)   LMP 01/15/2022   BMI 39 68 kg/m²   Pregravid Weight/BMI: 86 2 kg (190 lb) (BMI 33 67)  Current Weight: 102 kg (224 lb)   Total Weight Gain: 15 4 kg (34 lb)   Pre-Sofia Vitals    Flowsheet Row Most Recent Value   Prenatal Assessment    Prenatal Vitals    Blood Pressure 140/90   Weight - Scale 102 kg (224 lb)   Urine Albumin/Glucose    Dilation/Effacement/Station    Vaginal Drainage    Edema            General: Well appearing, no distress  Respiratory: Unlabored breathing  Cardiovascular: Regular rate  Abdomen: Soft, gravid, nontender  Fundal Height: Appropriate for gestational age  Extremities: Warm and well perfused  Non tender

## 2022-10-13 LAB — GP B STREP DNA SPEC QL NAA+PROBE: NEGATIVE

## 2022-10-14 ENCOUNTER — ROUTINE PRENATAL (OUTPATIENT)
Dept: PERINATAL CARE | Facility: OTHER | Age: 32
End: 2022-10-14
Payer: COMMERCIAL

## 2022-10-14 VITALS
HEIGHT: 63 IN | HEART RATE: 97 BPM | WEIGHT: 228.1 LBS | BODY MASS INDEX: 40.41 KG/M2 | DIASTOLIC BLOOD PRESSURE: 84 MMHG | SYSTOLIC BLOOD PRESSURE: 126 MMHG

## 2022-10-14 DIAGNOSIS — Z3A.36 36 WEEKS GESTATION OF PREGNANCY: ICD-10-CM

## 2022-10-14 DIAGNOSIS — O24.113 PREGNANCY COMPLICATED BY PRE-EXISTING TYPE 2 DIABETES IN THIRD TRIMESTER: Primary | ICD-10-CM

## 2022-10-14 DIAGNOSIS — O99.210 OBESITY AFFECTING PREGNANCY, ANTEPARTUM: ICD-10-CM

## 2022-10-14 PROCEDURE — 59025 FETAL NON-STRESS TEST: CPT | Performed by: OBSTETRICS & GYNECOLOGY

## 2022-10-17 ENCOUNTER — ULTRASOUND (OUTPATIENT)
Dept: PERINATAL CARE | Facility: OTHER | Age: 32
End: 2022-10-17
Payer: COMMERCIAL

## 2022-10-17 VITALS
HEIGHT: 63 IN | DIASTOLIC BLOOD PRESSURE: 72 MMHG | BODY MASS INDEX: 40.26 KG/M2 | SYSTOLIC BLOOD PRESSURE: 124 MMHG | WEIGHT: 227.2 LBS | HEART RATE: 105 BPM

## 2022-10-17 DIAGNOSIS — O24.113 PREGNANCY COMPLICATED BY PRE-EXISTING TYPE 2 DIABETES IN THIRD TRIMESTER: Primary | ICD-10-CM

## 2022-10-17 DIAGNOSIS — Z3A.37 37 WEEKS GESTATION OF PREGNANCY: ICD-10-CM

## 2022-10-17 DIAGNOSIS — O99.210 OBESITY AFFECTING PREGNANCY, ANTEPARTUM: ICD-10-CM

## 2022-10-17 PROCEDURE — 59025 FETAL NON-STRESS TEST: CPT | Performed by: OBSTETRICS & GYNECOLOGY

## 2022-10-17 PROCEDURE — 76815 OB US LIMITED FETUS(S): CPT | Performed by: OBSTETRICS & GYNECOLOGY

## 2022-10-17 RX ORDER — GLUCAGON 3 MG/1
1 POWDER NASAL ONCE AS NEEDED
Qty: 1 EACH | Refills: 3 | Status: SHIPPED | OUTPATIENT
Start: 2022-10-17

## 2022-10-17 NOTE — PATIENT INSTRUCTIONS
Thank you for choosing us for your  care today  If you have any questions about your ultrasound or care, please do not hesitate to contact us or your primary obstetrician  Some general instructions for your pregnancy are:    Protect against coronavirus: get vaccinated - pregnant women are increased risk of severe COVID  Notify your primary care doctor if you have any symptoms  Exercise: Aim for 22 minutes per day (150 minutes per week) of regular exercise  Walking is great! Nutrition: aim for calcium-rich and iron-rich foods as well as healthy sources of protein  Learn about Preeclampsia: preeclampsia is a common, serious high blood pressure complication in pregnancy  A blood pressure of 951ECBY (systolic or top number) or 42JIOB (diastolic or bottom number) is not normal and needs evaluation by your doctor  Aspirin is sometimes prescribed in early pregnancy to prevent preeclampsia in women with risk factors - ask your obstetrician if you should be on this medication  If you smoke, try to reduce how many cigarettes you smoke or try to quit completely  Do not vape  Other warning signs to watch out for in pregnancy or postpartum: chest pain, obstructed breathing or shortness of breath, seizures, thoughts of hurting yourself or your baby, bleeding, a painful or swollen leg, fever, or headache (see AWHONN POST-BIRTH Warning Signs campaign)  If these happen call 911  Itching is also not normal in pregnancy and if you experience this, especially over your hands and feet, potentially worse at night, notify your doctors

## 2022-10-18 ENCOUNTER — ROUTINE PRENATAL (OUTPATIENT)
Dept: OBGYN CLINIC | Facility: CLINIC | Age: 32
End: 2022-10-18

## 2022-10-18 VITALS
SYSTOLIC BLOOD PRESSURE: 132 MMHG | WEIGHT: 226.2 LBS | BODY MASS INDEX: 40.08 KG/M2 | HEIGHT: 63 IN | DIASTOLIC BLOOD PRESSURE: 88 MMHG

## 2022-10-18 DIAGNOSIS — O24.113 PREGNANCY COMPLICATED BY PRE-EXISTING TYPE 2 DIABETES IN THIRD TRIMESTER: Primary | ICD-10-CM

## 2022-10-18 DIAGNOSIS — Z3A.37 37 WEEKS GESTATION OF PREGNANCY: ICD-10-CM

## 2022-10-18 DIAGNOSIS — Z67.91 RH NEGATIVE STATUS DURING PREGNANCY IN THIRD TRIMESTER: ICD-10-CM

## 2022-10-18 DIAGNOSIS — O26.893 RH NEGATIVE STATUS DURING PREGNANCY IN THIRD TRIMESTER: ICD-10-CM

## 2022-10-18 LAB
SL AMB  POCT GLUCOSE, UA: NEGATIVE
SL AMB POCT URINE PROTEIN: NEGATIVE

## 2022-10-18 NOTE — PROGRESS NOTES
Routine Prenatal Visit  [address] [facility] [facility], [address], [facility], [address] [facility]    Assessment/Plan:  Alesia Smith is a 34y o  year old  at 37w3d who presents for routine prenatal visit  1  Pregnancy complicated by pre-existing type 2 diabetes in third trimester    2  Rh negative status during pregnancy in third trimester    3  37 weeks gestation of pregnancy  -     POCT urine dip          Subjective:     CC: Prenatal care    Mohini Ramírez is a 34 y o   female who presents for routine prenatal care at 37w3d  Pregnancy ROS: no leakage of fluid, pelvic pain, or vaginal bleeding  normal fetal movement  The following portions of the patient's history were reviewed and updated as appropriate: allergies, current medications, past family history, past medical history, obstetric history, gynecologic history, past social history, past surgical history and problem list       Objective:  /88 (BP Location: Left arm, Patient Position: Sitting, Cuff Size: Standard)   Ht 5' 3" (1 6 m)   Wt 103 kg (226 lb 3 2 oz)   LMP 01/15/2022   BMI 40 07 kg/m²   Pregravid Weight/BMI: 86 2 kg (190 lb) (BMI 33 67)  Current Weight: 103 kg (226 lb 3 2 oz)   Total Weight Gain: 16 4 kg (36 lb 3 2 oz)   Pre- Vitals    Flowsheet Row Most Recent Value   Prenatal Assessment    Prenatal Vitals    Blood Pressure 132/88   Weight - Scale 103 kg (226 lb 3 2 oz)   Urine Albumin/Glucose    Dilation/Effacement/Station    Vaginal Drainage    Edema            General: Well appearing, no distress  Respiratory: Unlabored breathing  Cardiovascular: Regular rate  Abdomen: Soft, gravid, nontender  Fundal Height: Appropriate for gestational age  Extremities: Warm and well perfused  Non tender

## 2022-10-21 ENCOUNTER — ROUTINE PRENATAL (OUTPATIENT)
Dept: PERINATAL CARE | Facility: OTHER | Age: 32
End: 2022-10-21
Payer: COMMERCIAL

## 2022-10-21 VITALS
DIASTOLIC BLOOD PRESSURE: 84 MMHG | WEIGHT: 230.6 LBS | BODY MASS INDEX: 40.86 KG/M2 | SYSTOLIC BLOOD PRESSURE: 136 MMHG | HEART RATE: 86 BPM | HEIGHT: 63 IN

## 2022-10-21 DIAGNOSIS — Z3A.37 37 WEEKS GESTATION OF PREGNANCY: ICD-10-CM

## 2022-10-21 DIAGNOSIS — O24.113 PREGNANCY COMPLICATED BY PRE-EXISTING TYPE 2 DIABETES IN THIRD TRIMESTER: Primary | ICD-10-CM

## 2022-10-21 DIAGNOSIS — O99.210 OBESITY AFFECTING PREGNANCY, ANTEPARTUM: ICD-10-CM

## 2022-10-21 PROCEDURE — 59025 FETAL NON-STRESS TEST: CPT | Performed by: OBSTETRICS & GYNECOLOGY

## 2022-10-21 NOTE — Clinical Note
Fasting hyperglycemia still occurring despite high doses of insulin  Please see Dr Micheline Nelson last [address] on 10/4  Would recommend moving up induction to between 38-39 weeks     Batsheva Hameed

## 2022-10-21 NOTE — LETTER
October 21, 2022     [address]  [address] [address]  [facility] [address]    Patient: Leni Gil   YOB: 1990   Date of Visit: 10/21/2022       Dear Dr Kylah Ricardo:    Thank you for referring Leni Gil to me for evaluation  Below are my notes for this consultation  If you have questions, please do not hesitate to call me  I look forward to following your patient along with you  Sincerely,        Lana Mast MD        CC: No Recipients  Lana Mast MD  10/21/2022  6:11 PM  Sign when Signing Visit  NST is reactive   Lana Mast

## 2022-10-24 ENCOUNTER — ULTRASOUND (OUTPATIENT)
Dept: PERINATAL CARE | Facility: OTHER | Age: 32
End: 2022-10-24
Payer: COMMERCIAL

## 2022-10-24 ENCOUNTER — HOSPITAL ENCOUNTER (INPATIENT)
Facility: HOSPITAL | Age: 32
LOS: 4 days | Discharge: HOME | End: 2022-10-28
Attending: OBSTETRICS & GYNECOLOGY | Admitting: OBSTETRICS & GYNECOLOGY

## 2022-10-24 VITALS
BODY MASS INDEX: 40.89 KG/M2 | DIASTOLIC BLOOD PRESSURE: 86 MMHG | HEIGHT: 63 IN | HEART RATE: 102 BPM | SYSTOLIC BLOOD PRESSURE: 132 MMHG | WEIGHT: 230.8 LBS

## 2022-10-24 DIAGNOSIS — O99.810 HYPERGLYCEMIA IN PREGNANCY: Primary | ICD-10-CM

## 2022-10-24 DIAGNOSIS — O24.113 PREGNANCY COMPLICATED BY PRE-EXISTING TYPE 2 DIABETES IN THIRD TRIMESTER: ICD-10-CM

## 2022-10-24 DIAGNOSIS — Z3A.38 38 WEEKS GESTATION OF PREGNANCY: ICD-10-CM

## 2022-10-24 DIAGNOSIS — O24.113 PREGNANCY COMPLICATED BY PRE-EXISTING TYPE 2 DIABETES IN THIRD TRIMESTER: Primary | ICD-10-CM

## 2022-10-24 PROBLEM — O13.3 GESTATIONAL HYPERTENSION W/O SIGNIFICANT PROTEINURIA IN 3RD TRIMESTER: Status: ACTIVE | Noted: 2022-10-24

## 2022-10-24 LAB
ABO GROUP BLD: NORMAL
ABO GROUP BLD: NORMAL
ALBUMIN SERPL BCP-MCNC: 2.4 G/DL (ref 3.5–5)
ALP SERPL-CCNC: 242 U/L (ref 46–116)
ALT SERPL W P-5'-P-CCNC: 17 U/L (ref 12–78)
ANION GAP SERPL CALCULATED.3IONS-SCNC: 9 MMOL/L (ref 4–13)
AST SERPL W P-5'-P-CCNC: 24 U/L (ref 5–45)
BILIRUB SERPL-MCNC: 0.3 MG/DL (ref 0.2–1)
BLD GP AB SCN SERPL QL: NEGATIVE
BUN SERPL-MCNC: 12 MG/DL (ref 5–25)
CALCIUM ALBUM COR SERPL-MCNC: 10.2 MG/DL (ref 8.3–10.1)
CALCIUM SERPL-MCNC: 8.9 MG/DL (ref 8.3–10.1)
CHLORIDE SERPL-SCNC: 106 MMOL/L (ref 96–108)
CO2 SERPL-SCNC: 22 MMOL/L (ref 21–32)
CREAT SERPL-MCNC: 0.73 MG/DL (ref 0.6–1.3)
CREAT UR-MCNC: 68 MG/DL
ERYTHROCYTE [DISTWIDTH] IN BLOOD BY AUTOMATED COUNT: 13.8 % (ref 11.6–15.1)
GFR SERPL CREATININE-BSD FRML MDRD: 108 ML/MIN/1.73SQ M
GLUCOSE SERPL-MCNC: 102 MG/DL (ref 65–140)
GLUCOSE SERPL-MCNC: 103 MG/DL (ref 65–140)
GLUCOSE SERPL-MCNC: 104 MG/DL (ref 65–140)
GLUCOSE SERPL-MCNC: 77 MG/DL (ref 65–140)
HCT VFR BLD AUTO: 38.9 % (ref 34.8–46.1)
HGB BLD-MCNC: 12.9 G/DL (ref 11.5–15.4)
MCH RBC QN AUTO: 31.8 PG (ref 26.8–34.3)
MCHC RBC AUTO-ENTMCNC: 33.2 G/DL (ref 31.4–37.4)
MCV RBC AUTO: 96 FL (ref 82–98)
PLATELET # BLD AUTO: 178 THOUSANDS/UL (ref 149–390)
PMV BLD AUTO: 12.2 FL (ref 8.9–12.7)
POTASSIUM SERPL-SCNC: 4.5 MMOL/L (ref 3.5–5.3)
PROT SERPL-MCNC: 6.7 G/DL (ref 6.4–8.4)
PROT UR-MCNC: 44 MG/DL
PROT/CREAT UR: 0.65 MG/G{CREAT} (ref 0–0.1)
RBC # BLD AUTO: 4.06 MILLION/UL (ref 3.81–5.12)
RH BLD: NEGATIVE
RH BLD: NEGATIVE
SODIUM SERPL-SCNC: 137 MMOL/L (ref 135–147)
SPECIMEN EXPIRATION DATE: NORMAL
WBC # BLD AUTO: 9.05 THOUSAND/UL (ref 4.31–10.16)

## 2022-10-24 PROCEDURE — 76818 FETAL BIOPHYS PROFILE W/NST: CPT | Performed by: OBSTETRICS & GYNECOLOGY

## 2022-10-24 RX ORDER — SODIUM CHLORIDE, SODIUM LACTATE, POTASSIUM CHLORIDE, CALCIUM CHLORIDE 600; 310; 30; 20 MG/100ML; MG/100ML; MG/100ML; MG/100ML
125 INJECTION, SOLUTION INTRAVENOUS CONTINUOUS
Status: DISCONTINUED | OUTPATIENT
Start: 2022-10-24 | End: 2022-10-27

## 2022-10-24 RX ORDER — INSULIN LISPRO 100 [IU]/ML
24 INJECTION, SOLUTION INTRAVENOUS; SUBCUTANEOUS ONCE
Status: COMPLETED | OUTPATIENT
Start: 2022-10-24 | End: 2022-10-24

## 2022-10-24 RX ORDER — BUTORPHANOL TARTRATE 1 MG/ML
1 INJECTION, SOLUTION INTRAMUSCULAR; INTRAVENOUS
Status: DISCONTINUED | OUTPATIENT
Start: 2022-10-24 | End: 2022-10-27

## 2022-10-24 RX ORDER — ONDANSETRON 2 MG/ML
4 INJECTION INTRAMUSCULAR; INTRAVENOUS EVERY 6 HOURS PRN
Status: DISCONTINUED | OUTPATIENT
Start: 2022-10-24 | End: 2022-10-27

## 2022-10-24 RX ORDER — INSULIN GLARGINE 100 [IU]/ML
76 INJECTION, SOLUTION SUBCUTANEOUS ONCE
Status: DISCONTINUED | OUTPATIENT
Start: 2022-10-24 | End: 2022-10-26

## 2022-10-24 RX ORDER — CALCIUM CARBONATE 200(500)MG
1000 TABLET,CHEWABLE ORAL DAILY
Status: DISCONTINUED | OUTPATIENT
Start: 2022-10-24 | End: 2022-10-28 | Stop reason: HOSPADM

## 2022-10-24 RX ORDER — OXYTOCIN/RINGER'S LACTATE 30/500 ML
1-30 PLASTIC BAG, INJECTION (ML) INTRAVENOUS
Status: DISCONTINUED | OUTPATIENT
Start: 2022-10-24 | End: 2022-10-27

## 2022-10-24 RX ORDER — INSULIN LISPRO 100 [IU]/ML
22 INJECTION, SOLUTION INTRAVENOUS; SUBCUTANEOUS ONCE
Status: COMPLETED | OUTPATIENT
Start: 2022-10-24 | End: 2022-10-24

## 2022-10-24 RX ADMIN — INSULIN LISPRO 24 UNITS: 100 INJECTION, SOLUTION INTRAVENOUS; SUBCUTANEOUS at 16:14

## 2022-10-24 RX ADMIN — Medication 2 MILLI-UNITS/MIN: at 21:59

## 2022-10-24 RX ADMIN — SODIUM CHLORIDE, SODIUM LACTATE, POTASSIUM CHLORIDE, AND CALCIUM CHLORIDE 125 ML/HR: .6; .31; .03; .02 INJECTION, SOLUTION INTRAVENOUS at 21:47

## 2022-10-24 RX ADMIN — INSULIN LISPRO 22 UNITS: 100 INJECTION, SOLUTION INTRAVENOUS; SUBCUTANEOUS at 19:36

## 2022-10-24 NOTE — LETTER
2022    [address]  [address] [facility] [address]  [facility]    Patient: Sunita Lai   YOB: 1990   Date of Visit: 10/24/2022   Gestational age 36w4d   Lainey Wilson of this communication: Priority: coming to L&D now for delivery for gHTN       Dear Christen Carlos and Karen Mcguire,    This patient was seen recently in our  office  The content of my evaluation today is in the ultrasound report under "OB Procedures" tab  Please don't hesitate to contact our office with any concerns or questions       Sincerely,      Jan Mario MD  Attending Physician, Karmen

## 2022-10-24 NOTE — PROGRESS NOTES
[id number] [facility]: Ms Etta Manuel was seen today for NST (found under the pregnancy episode) which I reviewed the RN assessment and agree, and HUMBERTO (see ultrasound report under OB procedures tab)  MDM:   I  Diagnoses/Problems addressed:  Stable chronic illness: PGDM and Undiagnosed new problem(s) with uncertain prognosis: gHTN  II  Data: I reviewed notes from vitals from all prior prenatal visits  III  Risk of morbidity: Moderate (sent for delivery)    Please don't hesitate to contact our office with any concerns or questions  Cally Tucker    OB team notified via [facility] Text

## 2022-10-24 NOTE — PLAN OF CARE
Problem: BIRTH - VAGINAL/ SECTION  Goal: Fetal and maternal status remain reassuring during the birth process  Description: INTERVENTIONS:  - Monitor vital signs  - Monitor fetal heart rate  - Monitor uterine activity  - Monitor labor progression (vaginal delivery)  - DVT prophylaxis  - Antibiotic prophylaxis  Outcome: Progressing  Goal: Emotionally satisfying birthing experience for mother/fetus  Description: Interventions:  - Assess, plan, implement and evaluate the nursing care given to the patient in labor  - Advocate the philosophy that each childbirth experience is a unique experience and support the family's chosen level of involvement and control during the labor process   - Actively participate in both the patient's and family's teaching of the birth process  - Consider cultural, Protestant and age-specific factors and plan care for the patient in labor  Outcome: Progressing     Problem: Knowledge Deficit  Goal: Verbalizes understanding of labor plan  Description: Assess patient/family/caregiver's baseline knowledge level and ability to understand information  Provide education via patient/family/caregiver's preferred learning method at appropriate level of understanding  1  Provide teaching at level of understanding  2  Provide teaching via preferred learning method(s)  Outcome: Progressing  Goal: Patient/family/caregiver demonstrates understanding of disease process, treatment plan, medications, and discharge instructions  Description: Complete learning assessment and assess knowledge base  Interventions:  - Provide teaching at level of understanding  - Provide teaching via preferred learning methods  Outcome: Progressing     Problem: Labor & Delivery  Goal: Manages discomfort  Description: Assess and monitor for signs and symptoms of discomfort  Assess patient's pain level regularly and per hospital policy  Administer medications as ordered   Support use of nonpharmacological methods to help control pain such as distraction, imagery, relaxation, and application of heat and cold  Collaborate with interdisciplinary team and patient to determine appropriate pain management plan  1  Include patient in decisions related to comfort  2  Offer non-pharmacological pain management interventions  3  Report ineffective pain management to physician  Outcome: Progressing  Goal: Patient vital signs are stable  Description: 1  Assess vital signs - vaginal delivery    Outcome: Progressing     Problem: PAIN - ADULT  Goal: Verbalizes/displays adequate comfort level or baseline comfort level  Description: Interventions:  - Encourage patient to monitor pain and request assistance  - Assess pain using appropriate pain scale  - Administer analgesics based on type and severity of pain and evaluate response  - Implement non-pharmacological measures as appropriate and evaluate response  - Consider cultural and social influences on pain and pain management  - Notify physician/advanced practitioner if interventions unsuccessful or patient reports new pain  Outcome: Progressing     Problem: DISCHARGE PLANNING  Goal: Discharge to home or other facility with appropriate resources  Description: INTERVENTIONS:  - Identify barriers to discharge w/patient and caregiver  - Arrange for needed discharge resources and transportation as appropriate  - Identify discharge learning needs (meds, wound care, etc )  - Arrange for interpretive services to assist at discharge as needed  - Refer to Case Management Department for coordinating discharge planning if the patient needs post-hospital services based on physician/advanced practitioner order or complex needs related to functional status, cognitive ability, or social support system  Outcome: Progressing

## 2022-10-24 NOTE — PATIENT INSTRUCTIONS
Please go to Labor and Delivery now for evaluation  The address of [address] [place] [address] is [address] [facility], [address] [facility]

## 2022-10-24 NOTE — H&P
Ob/Gyn History and Physical  Patito Beach 34 y o  female MRN: [id number]  Unit/Bed#: LD TRIAGE 2 Encounter: [id number]    A/P  34 y o  Anyi Seen at 36w4d, here with gestational hypertension versus preeclampsia without severe features  (1) GHTN vs Preeclampsia without severe features  No h/o CHTN  On 10/11/22, the BP was 140/90  Today, at [place] [address] appointment, the BP was 142/86  No symptoms of severe disease  Now 38+ weeks, and delivery indicated  I discussed with Ms Beata Wiggins in detail the risks, benefits, and alternatives to induction of labor in this setting  The risks discussed included, but were not limited to: the risk of uterine rupture, with its attendant risks of hysterectomy and maternal and or fetal death; the risk of fetal hypoxic stress and permanent neurologic injury; as well as the risk of a prolonged induction ending in  section  Following our discussion of risks, benefits, and alternatives, all of Ms Baxter's questions were answered, and she stated her understanding, and her desire to assume these risks and to proceed with induction of labor  --> Admit  Labs to evaluate for the presence of end-organ damage  --> Follow BP/Sx; no magensium sulfate for now   --> Induction of labor  (2) Induction  Nulliparous but adeqaute pelvis  Cephalic, EFW [id number]  Cervix 1cm dilated and long  --> Duarte balloon  (3) Pregestational Type II Diabetes  Diagnosed based on HgbA1C of 6 5% in May  Has been on:    Metformin 500mg qAM, 1000mg qPM    Basaglar 96 units qHS    Novolog 22 units with breakfast    Novolog 24 units with lunch    Novolog 22 units with dinner  Took last night's Basaglar, as well as 22units of Novolog with breakfast   Has not yet had lunch  She reports good control  Last Hgb A1C was 6 4% on 8/15/22  Last EFW on 10/4 at 35 3 was 3085grams, 87th %ile  Today, the clinical EFW is 3800grams    --> Will allow to eat lunch and dinner, take usual insulin dose with these meals   --> Will give 80% of PM dose of 96 units of glargine (= 76 units)  --> Following this, will start testing protocol, insulin gtt as needed  (4) Rh negative   --> For RhoGAM studies postpartum  (5) Depression   --> Continue Zoloft  --> For EPDS postpartum  (6) Fetal status category I   --> Continuous fetal monitoring  Victor Hugo Livingston  10/24/22  -------------------------------------------------------------------------------------------------------  CC/    "I was sent here from my ultrasound with high blood pressure "    HPI/    Feels entirely well  Rare UC, no VB/LoF   (+)FM  No HA/VF changes/RUQ pain  Took morning insulin  Fasting was 87, PB was 110  Has not eaten lunch yet; did not take lunchtime insulin  Pregnancy complications/      Patient Active Problem List   Diagnosis   • Chronic fatigue   • Polyarthralgia   • Moderate recurrent major depression (HCC)   • KY (generalized anxiety disorder)   • Depression affecting pregnancy   • Obesity affecting pregnancy, antepartum   • Rh negative status during pregnancy   • Snoring   • 38 weeks gestation of pregnancy   • Prediabetes   • Hyperglycemia in pregnancy   • Pregnancy complicated by pre-existing type 2 diabetes in third trimester   • Excessive fetal growth affecting management of pregnancy in third trimester   • Gestational hypertension w/o significant proteinuria in 3rd trimester       Allergies/      Allergies as of 10/24/2022   • (No Known Allergies)       Rx/      No current facility-administered medications on file prior to encounter       Current Outpatient Medications on File Prior to Encounter   Medication Sig Dispense Refill   • Basaglar KwikPen 100 units/mL SOPN Inject 0 96 mL (96 Units total) under the skin daily at bedtime split dose into 2 (31 units each injected one after another into 2 different sites spaced 2 to 3 inches apart) at 9:30 PM  87 36 mL 0   • calcium carbonate (TUMS) 500 mg chewable tablet Chew 2 tablets daily     • insulin aspart (NovoLOG FlexPen) 100 UNIT/ML injection pen Inject 22 units before breakfast; 24 units before lunch and 22 units before dinner  90 day supply  45 mL 0   • metFORMIN (GLUCOPHAGE) 500 mg tablet Take 1 tablet (500 mg total) by mouth 2 (two) times a day with meals Take 500 mg with breakfast and 1000 mg with dinner  90 day supply  180 tablet 1   • Prenatal MV-Min-Fe Fum-FA-DHA (PRENATAL 1 PO) Take 1 tablet by mouth daily     • sertraline (ZOLOFT) 50 mg tablet TAKE 1 TABLET BY MOUTH EVERY DAY 90 tablet 1   • Accu-Chek Guide test strip Test 4 times a day and as instructed  GDM  100 each 3   • Accu-Chek Softclix Lancets lancets Use 4 a day  GDM  100 each 7   • Blood Glucose Monitoring Suppl (Accu-Chek Guide Me) w/Device KIT Dispense 1 kit  GDM  1 kit 0   • Glucagon (Baqsimi One Pack) 3 MG/DOSE POWD 1 Package into each nostril once as needed (hypoglycemia) for up to 1 dose 1 each 3   • Insulin Pen Needle (B-D UF III MINI PEN NEEDLES) 31G X 5 MM MISC USE ONCE DAILY 100 each 3   • Magnesium 100 MG TABS Take 100 mg by mouth in the morning         PMH/      Past Medical History:   Diagnosis Date   • Chronic fatigue    • Depression     currently on Zoloft 50 mg managed by PCP         PSH/      Past Surgical History:   Procedure Laterality Date   • TONSILECTOMY AND ADNOIDECTOMY     • WISDOM TOOTH EXTRACTION         ObHx/        GynHx/    Menarche occurred at 14years of age  There is There is no history of sexually-transmitted infections  She is sexually active with male partners  She has had 1 sexual partner(s) in the past 12 months  FH/    There is no family history of birth defects          Family History   Problem Relation Age of Onset   • Thyroid disease Mother    • Diabetes type II Father    • Diabetes type II Maternal Grandmother    • Cancer Maternal Grandfather    • Lung cancer Maternal Grandfather    • Diabetes type II Maternal Grandfather    • Breast cancer Neg Hx    • Ovarian cancer Neg Hx    • Colon cancer Neg Hx    • Down syndrome Neg Hx    • Fragile X syndrome Neg Hx    • Cystic fibrosis Neg Hx        SH/    She is   She works as an LPN  She does not use tobacco, alcohol, or illicit drugs          Social History     Socioeconomic History   • Marital status: /Civil Union     Spouse name: Not on file   • Number of children: Not on file   • Years of education: Not on file   • Highest education level: Not on file   Occupational History   • Occupation: LPN    Tobacco Use   • Smoking status: Former Smoker     Packs/day: 0 50     Years: 10 00     Pack years: 5 00     Types: Cigarettes     Quit date: 5/6/2019     Years since quitting: 3 4   • Smokeless tobacco: Never Used   Vaping Use   • Vaping Use: Never used   Substance and Sexual Activity   • Alcohol use: Not Currently     Alcohol/week: 2 0 standard drinks     Types: 2 Standard drinks or equivalent per week   • Drug use: Never   • Sexual activity: Yes     Partners: Male     Birth control/protection: None   Other Topics Concern   • Not on file   Social History Narrative   • Not on file     Social Determinants of Health     Financial Resource Strain: Not on file   Food Insecurity: Not on file   Transportation Needs: Not on file   Physical Activity: Not on file   Stress: Not on file   Social Connections: Not on file   Intimate Partner Violence: Not At Risk   • Fear of Current or Ex-Partner: No   • Emotionally Abused: No   • Physically Abused: No   • Sexually Abused: No   Housing Stability: Not on file       RoS/    Constitutional: Negative    CV: Negative    Pulm: Negative    GI: Negative    Urinary: Negative    Neuro: Negative    Musculoskeletal: Negative    O/  /86 (BP Location: Right arm)   Pulse 100   Temp 98 5 °F (36 9 °C) (Temporal)   Resp 18   LMP 01/15/2022     Alert, comfortable, no acute distress    Regular rate and Rhythm    Clear to auscultation bilaterally    Abdomen soft, nontender, nondistended  Fundus nontender, size consistent with dates      Cephalic      EFW [id number]    Gyn/      Normal female external genitalia  Pelvis adequate/gynecoid  Cervix:           Dilation: 1cm         Effacement: 20%         Station: Ballotable  Consistency: Medium         Position: Posterior   Presentation:      FHR: 130 moderate variability   (+) accels, no decels  [place]:  No contractions  Ultrasound:      Cephalic on ultrasound with MFM today      Prenatal labs/  Lab Results   Component Value Date    ABO O 08/15/2022    RH Negative 08/15/2022    ABS Normal 04/05/2022    HGB 11 5 08/15/2022     08/15/2022    EXTRUBELIGGQ Immune 04/05/2022    RPR Non Reactive 08/15/2022    HEPBSAG Negative 04/05/2022    HEPCAB <0 1 04/05/2022    HIVAGAB Non-Reactive 04/05/2022    GC Negative 03/16/2022    [id number] 149 (H) 04/05/2022     10/11/22 GBS Negative

## 2022-10-24 NOTE — PROGRESS NOTES
Repeat Non-Stress Testing:    Patient verbalizes +FM  Pt denies ALL:               Leaking of fluid   Contractions   Vaginal bleeding   Decreased fetal movement    Patient is performing daily kick counts  Patient has no questions or concerns     NST strip reviewed by Dr Ko Kapadia

## 2022-10-25 ENCOUNTER — ANESTHESIA EVENT (INPATIENT)
Dept: LABOR AND DELIVERY | Facility: HOSPITAL | Age: 32
End: 2022-10-25
Payer: COMMERCIAL

## 2022-10-25 ENCOUNTER — ANESTHESIA (INPATIENT)
Dept: LABOR AND DELIVERY | Facility: HOSPITAL | Age: 32
End: 2022-10-25
Payer: COMMERCIAL

## 2022-10-25 PROBLEM — O14.90 PREECLAMPSIA: Status: ACTIVE | Noted: 2022-10-24

## 2022-10-25 LAB
ALBUMIN SERPL BCP-MCNC: 2.2 G/DL (ref 3.5–5)
ALP SERPL-CCNC: 232 U/L (ref 46–116)
ALT SERPL W P-5'-P-CCNC: 19 U/L (ref 12–78)
ANION GAP SERPL CALCULATED.3IONS-SCNC: 17 MMOL/L (ref 4–13)
AST SERPL W P-5'-P-CCNC: 22 U/L (ref 5–45)
BASE EXCESS BLDCOA CALC-SCNC: -11.3 MMOL/L (ref 3–11)
BASE EXCESS BLDCOV CALC-SCNC: -11.2 MMOL/L (ref 1–9)
BILIRUB SERPL-MCNC: 0.4 MG/DL (ref 0.2–1)
BUN SERPL-MCNC: 18 MG/DL (ref 5–25)
CALCIUM ALBUM COR SERPL-MCNC: 10.1 MG/DL (ref 8.3–10.1)
CALCIUM SERPL-MCNC: 8.7 MG/DL (ref 8.3–10.1)
CHLORIDE SERPL-SCNC: 100 MMOL/L (ref 96–108)
CO2 SERPL-SCNC: 16 MMOL/L (ref 21–32)
CREAT SERPL-MCNC: 1.33 MG/DL (ref 0.6–1.3)
ERYTHROCYTE [DISTWIDTH] IN BLOOD BY AUTOMATED COUNT: 14.4 % (ref 11.6–15.1)
GFR SERPL CREATININE-BSD FRML MDRD: 52 ML/MIN/1.73SQ M
GLUCOSE SERPL-MCNC: 100 MG/DL (ref 65–140)
GLUCOSE SERPL-MCNC: 101 MG/DL (ref 65–140)
GLUCOSE SERPL-MCNC: 112 MG/DL (ref 65–140)
GLUCOSE SERPL-MCNC: 113 MG/DL (ref 65–140)
GLUCOSE SERPL-MCNC: 76 MG/DL (ref 65–140)
GLUCOSE SERPL-MCNC: 78 MG/DL (ref 65–140)
GLUCOSE SERPL-MCNC: 79 MG/DL (ref 65–140)
GLUCOSE SERPL-MCNC: 85 MG/DL (ref 65–140)
GLUCOSE SERPL-MCNC: 85 MG/DL (ref 65–140)
GLUCOSE SERPL-MCNC: 90 MG/DL (ref 65–140)
GLUCOSE SERPL-MCNC: 90 MG/DL (ref 65–140)
GLUCOSE SERPL-MCNC: 91 MG/DL (ref 65–140)
HCO3 BLDCOA-SCNC: 18.6 MMOL/L (ref 17.3–27.3)
HCO3 BLDCOV-SCNC: 17.3 MMOL/L (ref 12.2–28.6)
HCT VFR BLD AUTO: 37.1 % (ref 34.8–46.1)
HGB BLD-MCNC: 12.1 G/DL (ref 11.5–15.4)
MAGNESIUM SERPL-MCNC: 3.4 MG/DL (ref 1.6–2.6)
MAGNESIUM SERPL-MCNC: 3.8 MG/DL (ref 1.6–2.6)
MCH RBC QN AUTO: 32.1 PG (ref 26.8–34.3)
MCHC RBC AUTO-ENTMCNC: 32.6 G/DL (ref 31.4–37.4)
MCV RBC AUTO: 98 FL (ref 82–98)
O2 CT VFR BLDCOA CALC: 3.5 ML/DL
OXYHGB MFR BLDCOA: 14.7 %
OXYHGB MFR BLDCOV: 37.4 %
PCO2 BLDCOA: 57.3 MM[HG] (ref 30–60)
PCO2 BLDCOV: 48.1 MM HG (ref 27–43)
PH BLDCOA: 7.13 [PH] (ref 7.23–7.43)
PH BLDCOV: 7.17 [PH] (ref 7.19–7.49)
PLATELET # BLD AUTO: 161 THOUSANDS/UL (ref 149–390)
PMV BLD AUTO: 11.5 FL (ref 8.9–12.7)
PO2 BLDCOA: 12.1 MM HG (ref 5–25)
PO2 BLDCOV: 20.1 MM HG (ref 15–45)
POTASSIUM SERPL-SCNC: 4.1 MMOL/L (ref 3.5–5.3)
PROT SERPL-MCNC: 6.3 G/DL (ref 6.4–8.4)
RBC # BLD AUTO: 3.77 MILLION/UL (ref 3.81–5.12)
RPR SER QL: NORMAL
SAO2 % BLDCOV: 9 ML/DL
SODIUM SERPL-SCNC: 133 MMOL/L (ref 135–147)
URATE SERPL-MCNC: 7.5 MG/DL (ref 2–7.5)
WBC # BLD AUTO: 19.98 THOUSAND/UL (ref 4.31–10.16)

## 2022-10-25 RX ORDER — LABETALOL HYDROCHLORIDE 5 MG/ML
20 INJECTION, SOLUTION INTRAVENOUS ONCE
Status: COMPLETED | OUTPATIENT
Start: 2022-10-25 | End: 2022-10-25

## 2022-10-25 RX ORDER — HYDROMORPHONE HCL/PF 1 MG/ML
0.5 SYRINGE (ML) INJECTION
Status: DISCONTINUED | OUTPATIENT
Start: 2022-10-25 | End: 2022-10-27

## 2022-10-25 RX ORDER — LABETALOL HYDROCHLORIDE 5 MG/ML
INJECTION, SOLUTION INTRAVENOUS
Status: COMPLETED
Start: 2022-10-25 | End: 2022-10-25

## 2022-10-25 RX ORDER — NALOXONE HYDROCHLORIDE 0.4 MG/ML
0.1 INJECTION, SOLUTION INTRAMUSCULAR; INTRAVENOUS; SUBCUTANEOUS
Status: ACTIVE | OUTPATIENT
Start: 2022-10-25 | End: 2022-10-26

## 2022-10-25 RX ORDER — LIDOCAINE HYDROCHLORIDE 10 MG/ML
INJECTION, SOLUTION EPIDURAL; INFILTRATION; INTRACAUDAL; PERINEURAL
Status: COMPLETED | OUTPATIENT
Start: 2022-10-25 | End: 2022-10-25

## 2022-10-25 RX ORDER — SODIUM CHLORIDE, SODIUM LACTATE, POTASSIUM CHLORIDE, CALCIUM CHLORIDE 600; 310; 30; 20 MG/100ML; MG/100ML; MG/100ML; MG/100ML
125 INJECTION, SOLUTION INTRAVENOUS CONTINUOUS
Status: DISCONTINUED | OUTPATIENT
Start: 2022-10-25 | End: 2022-10-25

## 2022-10-25 RX ORDER — HYDROMORPHONE HCL/PF 1 MG/ML
0.5 SYRINGE (ML) INJECTION EVERY 2 HOUR PRN
Status: DISCONTINUED | OUTPATIENT
Start: 2022-10-25 | End: 2022-10-28 | Stop reason: HOSPADM

## 2022-10-25 RX ORDER — EPHEDRINE SULFATE 50 MG/ML
INJECTION INTRAVENOUS AS NEEDED
Status: DISCONTINUED | OUTPATIENT
Start: 2022-10-25 | End: 2022-10-25

## 2022-10-25 RX ORDER — BUPIVACAINE HYDROCHLORIDE 2.5 MG/ML
INJECTION, SOLUTION EPIDURAL; INFILTRATION; INTRACAUDAL
Status: COMPLETED | OUTPATIENT
Start: 2022-10-25 | End: 2022-10-25

## 2022-10-25 RX ORDER — MORPHINE SULFATE 1 MG/ML
INJECTION, SOLUTION EPIDURAL; INTRATHECAL; INTRAVENOUS AS NEEDED
Status: DISCONTINUED | OUTPATIENT
Start: 2022-10-25 | End: 2022-10-25

## 2022-10-25 RX ORDER — LABETALOL HYDROCHLORIDE 5 MG/ML
40 INJECTION, SOLUTION INTRAVENOUS ONCE
Status: COMPLETED | OUTPATIENT
Start: 2022-10-25 | End: 2022-10-25

## 2022-10-25 RX ORDER — ROPIVACAINE HYDROCHLORIDE 2 MG/ML
INJECTION, SOLUTION EPIDURAL; INFILTRATION; PERINEURAL CONTINUOUS PRN
Status: DISCONTINUED | OUTPATIENT
Start: 2022-10-25 | End: 2022-10-25

## 2022-10-25 RX ORDER — TRANEXAMIC ACID 100 MG/ML
INJECTION, SOLUTION INTRAVENOUS AS NEEDED
Status: DISCONTINUED | OUTPATIENT
Start: 2022-10-25 | End: 2022-10-25

## 2022-10-25 RX ORDER — MAGNESIUM SULFATE HEPTAHYDRATE 40 MG/ML
1 INJECTION, SOLUTION INTRAVENOUS CONTINUOUS
Status: CANCELLED | OUTPATIENT
Start: 2022-10-25

## 2022-10-25 RX ORDER — LIDOCAINE HYDROCHLORIDE AND EPINEPHRINE 15; 5 MG/ML; UG/ML
INJECTION, SOLUTION EPIDURAL AS NEEDED
Status: DISCONTINUED | OUTPATIENT
Start: 2022-10-25 | End: 2022-10-25

## 2022-10-25 RX ORDER — ALBUMIN, HUMAN INJ 5% 5 %
SOLUTION INTRAVENOUS CONTINUOUS PRN
Status: DISCONTINUED | OUTPATIENT
Start: 2022-10-25 | End: 2022-10-25

## 2022-10-25 RX ORDER — CEFAZOLIN SODIUM 2 G/50ML
2000 SOLUTION INTRAVENOUS ONCE
Status: COMPLETED | OUTPATIENT
Start: 2022-10-25 | End: 2022-10-25

## 2022-10-25 RX ORDER — ACETAMINOPHEN 325 MG/1
650 TABLET ORAL EVERY 6 HOURS SCHEDULED
Status: COMPLETED | OUTPATIENT
Start: 2022-10-26 | End: 2022-10-26

## 2022-10-25 RX ORDER — MAGNESIUM SULFATE HEPTAHYDRATE 40 MG/ML
INJECTION, SOLUTION INTRAVENOUS
Status: COMPLETED
Start: 2022-10-25 | End: 2022-10-25

## 2022-10-25 RX ORDER — ONDANSETRON 2 MG/ML
INJECTION INTRAMUSCULAR; INTRAVENOUS AS NEEDED
Status: DISCONTINUED | OUTPATIENT
Start: 2022-10-25 | End: 2022-10-25

## 2022-10-25 RX ORDER — OXYTOCIN 10 [USP'U]/ML
INJECTION, SOLUTION INTRAMUSCULAR; INTRAVENOUS AS NEEDED
Status: DISCONTINUED | OUTPATIENT
Start: 2022-10-25 | End: 2022-10-25

## 2022-10-25 RX ORDER — CARBOPROST TROMETHAMINE 250 UG/ML
INJECTION, SOLUTION INTRAMUSCULAR AS NEEDED
Status: DISCONTINUED | OUTPATIENT
Start: 2022-10-25 | End: 2022-10-25

## 2022-10-25 RX ORDER — DEXAMETHASONE SODIUM PHOSPHATE 10 MG/ML
INJECTION, SOLUTION INTRAMUSCULAR; INTRAVENOUS AS NEEDED
Status: DISCONTINUED | OUTPATIENT
Start: 2022-10-25 | End: 2022-10-25

## 2022-10-25 RX ORDER — SODIUM CHLORIDE, SODIUM LACTATE, POTASSIUM CHLORIDE, CALCIUM CHLORIDE 600; 310; 30; 20 MG/100ML; MG/100ML; MG/100ML; MG/100ML
INJECTION, SOLUTION INTRAVENOUS CONTINUOUS PRN
Status: DISCONTINUED | OUTPATIENT
Start: 2022-10-25 | End: 2022-10-25

## 2022-10-25 RX ORDER — PROMETHAZINE HYDROCHLORIDE 25 MG/ML
12.5 INJECTION, SOLUTION INTRAMUSCULAR; INTRAVENOUS ONCE
Status: COMPLETED | OUTPATIENT
Start: 2022-10-25 | End: 2022-10-25

## 2022-10-25 RX ORDER — MAGNESIUM SULFATE HEPTAHYDRATE 40 MG/ML
4 INJECTION, SOLUTION INTRAVENOUS ONCE
Status: COMPLETED | OUTPATIENT
Start: 2022-10-25 | End: 2022-10-26

## 2022-10-25 RX ORDER — CHLOROPROCAINE HYDROCHLORIDE 30 MG/ML
INJECTION, SOLUTION EPIDURAL; INFILTRATION; INTRACAUDAL; PERINEURAL AS NEEDED
Status: DISCONTINUED | OUTPATIENT
Start: 2022-10-25 | End: 2022-10-25

## 2022-10-25 RX ORDER — FENTANYL CITRATE/PF 50 MCG/ML
25 SYRINGE (ML) INJECTION
Status: DISCONTINUED | OUTPATIENT
Start: 2022-10-25 | End: 2022-10-27

## 2022-10-25 RX ORDER — MAGNESIUM SULFATE HEPTAHYDRATE 40 MG/ML
2 INJECTION, SOLUTION INTRAVENOUS CONTINUOUS
Status: DISPENSED | OUTPATIENT
Start: 2022-10-25 | End: 2022-10-26

## 2022-10-25 RX ORDER — ACETAMINOPHEN 325 MG/1
650 TABLET ORAL ONCE
Status: COMPLETED | OUTPATIENT
Start: 2022-10-25 | End: 2022-10-25

## 2022-10-25 RX ADMIN — LABETALOL HYDROCHLORIDE 40 MG: 5 INJECTION, SOLUTION INTRAVENOUS at 19:42

## 2022-10-25 RX ADMIN — PROMETHAZINE HYDROCHLORIDE 12.5 MG: 25 INJECTION INTRAMUSCULAR; INTRAVENOUS at 15:35

## 2022-10-25 RX ADMIN — CHLOROPROCAINE HYDROCHLORIDE 5 ML: 30 INJECTION, SOLUTION EPIDURAL; INFILTRATION; INTRACAUDAL; PERINEURAL at 21:51

## 2022-10-25 RX ADMIN — ACETAMINOPHEN 650 MG: 325 TABLET ORAL at 14:21

## 2022-10-25 RX ADMIN — EPHEDRINE SULFATE 15 MG: 50 INJECTION INTRAVENOUS at 22:42

## 2022-10-25 RX ADMIN — AZITHROMYCIN MONOHYDRATE 500 MG: 500 INJECTION, POWDER, LYOPHILIZED, FOR SOLUTION INTRAVENOUS at 21:01

## 2022-10-25 RX ADMIN — MORPHINE SULFATE 3 MG: 1 INJECTION, SOLUTION EPIDURAL; INTRATHECAL; INTRAVENOUS at 21:55

## 2022-10-25 RX ADMIN — SODIUM CHLORIDE, SODIUM LACTATE, POTASSIUM CHLORIDE, AND CALCIUM CHLORIDE 125 ML/HR: .6; .31; .03; .02 INJECTION, SOLUTION INTRAVENOUS at 11:12

## 2022-10-25 RX ADMIN — SODIUM CHLORIDE, SODIUM LACTATE, POTASSIUM CHLORIDE, AND CALCIUM CHLORIDE 125 ML/HR: .6; .31; .03; .02 INJECTION, SOLUTION INTRAVENOUS at 14:53

## 2022-10-25 RX ADMIN — ALBUMIN (HUMAN): 12.5 INJECTION, SOLUTION INTRAVENOUS at 22:00

## 2022-10-25 RX ADMIN — ROPIVACAINE HYDROCHLORIDE 12 ML/HR: 2 INJECTION, SOLUTION EPIDURAL; INFILTRATION at 07:10

## 2022-10-25 RX ADMIN — ROPIVACAINE HYDROCHLORIDE: 2 INJECTION, SOLUTION EPIDURAL; INFILTRATION at 07:19

## 2022-10-25 RX ADMIN — SODIUM CHLORIDE, SODIUM LACTATE, POTASSIUM CHLORIDE, AND CALCIUM CHLORIDE 75 ML/HR: .6; .31; .03; .02 INJECTION, SOLUTION INTRAVENOUS at 19:56

## 2022-10-25 RX ADMIN — ONDANSETRON 4 MG: 2 INJECTION INTRAMUSCULAR; INTRAVENOUS at 10:49

## 2022-10-25 RX ADMIN — BUPIVACAINE HYDROCHLORIDE 3 ML: 2.5 INJECTION, SOLUTION EPIDURAL; INFILTRATION; INTRACAUDAL; PERINEURAL at 07:11

## 2022-10-25 RX ADMIN — EPHEDRINE SULFATE 10 MG: 50 INJECTION INTRAVENOUS at 22:31

## 2022-10-25 RX ADMIN — SODIUM CHLORIDE, SODIUM LACTATE, POTASSIUM CHLORIDE, AND CALCIUM CHLORIDE 125 ML/HR: .6; .31; .03; .02 INJECTION, SOLUTION INTRAVENOUS at 06:53

## 2022-10-25 RX ADMIN — TRANEXAMIC ACID 1 G: 100 INJECTION INTRAVENOUS at 21:43

## 2022-10-25 RX ADMIN — CHLOROPROCAINE HYDROCHLORIDE 5 ML: 30 INJECTION, SOLUTION EPIDURAL; INFILTRATION; INTRACAUDAL; PERINEURAL at 21:05

## 2022-10-25 RX ADMIN — SODIUM CHLORIDE, SODIUM LACTATE, POTASSIUM CHLORIDE, AND CALCIUM CHLORIDE: .6; .31; .03; .02 INJECTION, SOLUTION INTRAVENOUS at 21:31

## 2022-10-25 RX ADMIN — LIDOCAINE HYDROCHLORIDE AND EPINEPHRINE 3 ML: 15; 5 INJECTION, SOLUTION EPIDURAL at 07:09

## 2022-10-25 RX ADMIN — EPHEDRINE SULFATE 10 MG: 50 INJECTION INTRAVENOUS at 22:21

## 2022-10-25 RX ADMIN — MAGNESIUM SULFATE HEPTAHYDRATE 4 G: 40 INJECTION, SOLUTION INTRAVENOUS at 19:52

## 2022-10-25 RX ADMIN — EPHEDRINE SULFATE 5 MG: 50 INJECTION INTRAVENOUS at 21:31

## 2022-10-25 RX ADMIN — SODIUM CHLORIDE, SODIUM LACTATE, POTASSIUM CHLORIDE, AND CALCIUM CHLORIDE: .6; .31; .03; .02 INJECTION, SOLUTION INTRAVENOUS at 22:25

## 2022-10-25 RX ADMIN — OXYTOCIN 20 UNITS: 10 INJECTION, SOLUTION INTRAMUSCULAR; INTRAVENOUS at 21:43

## 2022-10-25 RX ADMIN — MAGNESIUM SULFATE HEPTAHYDRATE 2 G/HR: 40 INJECTION, SOLUTION INTRAVENOUS at 20:13

## 2022-10-25 RX ADMIN — ONDANSETRON 4 MG: 2 INJECTION INTRAMUSCULAR; INTRAVENOUS at 21:41

## 2022-10-25 RX ADMIN — SODIUM CHLORIDE, SODIUM LACTATE, POTASSIUM CHLORIDE, AND CALCIUM CHLORIDE: .6; .31; .03; .02 INJECTION, SOLUTION INTRAVENOUS at 22:22

## 2022-10-25 RX ADMIN — EPHEDRINE SULFATE 10 MG: 50 INJECTION INTRAVENOUS at 21:40

## 2022-10-25 RX ADMIN — ALBUMIN (HUMAN): 12.5 INJECTION, SOLUTION INTRAVENOUS at 22:31

## 2022-10-25 RX ADMIN — CHLOROPROCAINE HYDROCHLORIDE 5 ML: 30 INJECTION, SOLUTION EPIDURAL; INFILTRATION; INTRACAUDAL; PERINEURAL at 21:04

## 2022-10-25 RX ADMIN — DEXAMETHASONE SODIUM PHOSPHATE 10 MG: 10 INJECTION, SOLUTION INTRAMUSCULAR; INTRAVENOUS at 21:41

## 2022-10-25 RX ADMIN — SERTRALINE HYDROCHLORIDE 50 MG: 50 TABLET ORAL at 09:07

## 2022-10-25 RX ADMIN — CHLOROPROCAINE HYDROCHLORIDE 5 ML: 30 INJECTION, SOLUTION EPIDURAL; INFILTRATION; INTRACAUDAL; PERINEURAL at 21:55

## 2022-10-25 RX ADMIN — Medication 250 MILLI-UNITS/MIN: at 22:14

## 2022-10-25 RX ADMIN — LABETALOL HYDROCHLORIDE 20 MG: 5 INJECTION, SOLUTION INTRAVENOUS at 19:28

## 2022-10-25 RX ADMIN — PHENYLEPHRINE HYDROCHLORIDE 100 MCG/MIN: 10 INJECTION INTRAVENOUS at 06:57

## 2022-10-25 RX ADMIN — CEFAZOLIN SODIUM 2000 MG: 2 SOLUTION INTRAVENOUS at 21:31

## 2022-10-25 RX ADMIN — LIDOCAINE HYDROCHLORIDE 5 ML: 10 INJECTION, SOLUTION EPIDURAL; INFILTRATION; INTRACAUDAL; PERINEURAL at 07:05

## 2022-10-25 RX ADMIN — SODIUM CHLORIDE, SODIUM LACTATE, POTASSIUM CHLORIDE, AND CALCIUM CHLORIDE 125 ML/HR: .6; .31; .03; .02 INJECTION, SOLUTION INTRAVENOUS at 02:46

## 2022-10-25 RX ADMIN — ROPIVACAINE HYDROCHLORIDE: 2 INJECTION, SOLUTION EPIDURAL; INFILTRATION at 14:21

## 2022-10-25 RX ADMIN — BUPIVACAINE HYDROCHLORIDE 5 ML: 2.5 INJECTION, SOLUTION EPIDURAL; INFILTRATION; INTRACAUDAL; PERINEURAL at 07:08

## 2022-10-25 RX ADMIN — ACETAMINOPHEN 650 MG: 325 TABLET, FILM COATED ORAL at 23:57

## 2022-10-25 RX ADMIN — BUTORPHANOL TARTRATE 1 MG: 1 INJECTION, SOLUTION INTRAMUSCULAR; INTRAVENOUS at 03:31

## 2022-10-25 RX ADMIN — CARBOPROST TROMETHAMINE 250 MCG: 250 INJECTION, SOLUTION INTRAMUSCULAR at 21:47

## 2022-10-25 NOTE — PLAN OF CARE
Problem: BIRTH - VAGINAL/ SECTION  Goal: Fetal and maternal status remain reassuring during the birth process  Description: INTERVENTIONS:  - Monitor vital signs  - Monitor fetal heart rate  - Monitor uterine activity  - Monitor labor progression (vaginal delivery)  - DVT prophylaxis  - Antibiotic prophylaxis  Outcome: Progressing  Goal: Emotionally satisfying birthing experience for mother/fetus  Description: Interventions:  - Assess, plan, implement and evaluate the nursing care given to the patient in labor  - Advocate the philosophy that each childbirth experience is a unique experience and support the family's chosen level of involvement and control during the labor process   - Actively participate in both the patient's and family's teaching of the birth process  - Consider cultural, Voodoo and age-specific factors and plan care for the patient in labor  Outcome: Progressing     Problem: Knowledge Deficit  Goal: Verbalizes understanding of labor plan  Description: Assess patient/family/caregiver's baseline knowledge level and ability to understand information  Provide education via patient/family/caregiver's preferred learning method at appropriate level of understanding  1  Provide teaching at level of understanding  2  Provide teaching via preferred learning method(s)  Outcome: Progressing  Goal: Patient/family/caregiver demonstrates understanding of disease process, treatment plan, medications, and discharge instructions  Description: Complete learning assessment and assess knowledge base  Interventions:  - Provide teaching at level of understanding  - Provide teaching via preferred learning methods  Outcome: Progressing     Problem: Labor & Delivery  Goal: Manages discomfort  Description: Assess and monitor for signs and symptoms of discomfort  Assess patient's pain level regularly and per hospital policy  Administer medications as ordered   Support use of nonpharmacological methods to help control pain such as distraction, imagery, relaxation, and application of heat and cold  Collaborate with interdisciplinary team and patient to determine appropriate pain management plan  1  Include patient in decisions related to comfort  2  Offer non-pharmacological pain management interventions  3  Report ineffective pain management to physician  Outcome: Progressing  Goal: Patient vital signs are stable  Description: 1  Assess vital signs - vaginal delivery    Outcome: Progressing     Problem: PAIN - ADULT  Goal: Verbalizes/displays adequate comfort level or baseline comfort level  Description: Interventions:  - Encourage patient to monitor pain and request assistance  - Assess pain using appropriate pain scale  - Administer analgesics based on type and severity of pain and evaluate response  - Implement non-pharmacological measures as appropriate and evaluate response  - Consider cultural and social influences on pain and pain management  - Notify physician/advanced practitioner if interventions unsuccessful or patient reports new pain  Outcome: Progressing     Problem: DISCHARGE PLANNING  Goal: Discharge to home or other facility with appropriate resources  Description: INTERVENTIONS:  - Identify barriers to discharge w/patient and caregiver  - Arrange for needed discharge resources and transportation as appropriate  - Identify discharge learning needs (meds, wound care, etc )  - Arrange for interpretive services to assist at discharge as needed  - Refer to Case Management Department for coordinating discharge planning if the patient needs post-hospital services based on physician/advanced practitioner order or complex needs related to functional status, cognitive ability, or social support system  Outcome: Progressing

## 2022-10-25 NOTE — OB LABOR/OXYTOCIN SAFETY PROGRESS
Oxytocin Safety Progress Check Note - Samina Aguilar 34 y o  female MRN: [id number]    Unit/Bed#: -01 Encounter: [id number]    Dose (nba-units/min) Oxytocin: 14 nba-units/min  Contraction Frequency (minutes): Poor pickup  Contraction Quality: Mild  Tachysystole: No   Cervical Dilation: 6        Cervical Effacement: 80  Fetal Station: -1  Baseline Rate: 145 bpm  Fetal Heart Rate: 130 BPM  FHR Category: Category I           Vital Signs:   Vitals:    10/25/22 0718   BP: 124/60   Pulse: (!) 108   Resp:    Temp:    SpO2:        Notes/comments:   - Assumed care of patient at 07:30  Contractions are inadequate by MVUs  IUPC removed and replaced   - Cervix unchanged from prior  Upon discussion with physician covering overnight, it was felt that cervical dilation was closer to 5 cm following AROM  Discussed overall labor course with patient, including minimal change in cervical dilation since midnight  Given reassuring fetal and maternal status, will continue pitocin titration for now  If unchanged after 2 additional hours, would recommend proceeding with delivery via primary  section  Patient agreeable           Trevor Del Valle 10/25/2022 8:38 AM

## 2022-10-25 NOTE — OB LABOR/OXYTOCIN SAFETY PROGRESS
Oxytocin Safety Progress Check Note - Carol Mom 34 y o  female MRN: [id number]    Unit/Bed#: -01 Encounter: [id number]    Dose (nba-units/min) Oxytocin: 30 nba-units/min  Contraction Frequency (minutes): 1 5-2  Contraction Quality: Moderate  Tachysystole: No   Cervical Dilation: Lip/rim (Comment)        Cervical Effacement: 100  Fetal Station: 0  Baseline Rate: 145 bpm  Fetal Heart Rate: 130 BPM  FHR Category: Category I           Vital Signs:   Vitals:    10/25/22 1418   BP: 122/64   Pulse: 101   Resp:    Temp:    SpO2:        Notes/comments:   - Continues to make change in active phase of labor  Attempted practice push, but cervical rim unable to be reduced  - Will position in high Kearns's position to allow passive descent  - Continue pitocin  Optimistic for           [place]coío Lyon 10/25/2022 3:06 PM

## 2022-10-25 NOTE — OB LABOR/OXYTOCIN SAFETY PROGRESS
Oxytocin Safety Progress Check Note - Rylee cMgee 34 y o  female MRN: [id number]    Unit/Bed#: -01 Encounter: [id number]    Dose (nba-units/min) Oxytocin: 30 nba-units/min  Contraction Frequency (minutes): 2-3  Contraction Quality: Strong  Tachysystole: No   Cervical Dilation: 10  Dilation Complete Date: 10/25/22  Dilation Complete Time:   Cervical Effacement: 100  Fetal Station: 1  Baseline Rate: 150 bpm  Fetal Heart Rate: 130 BPM  FHR Category: Category I           Vital Signs:   Vitals:    10/25/22 1655   BP:    Pulse:    Resp:    Temp: 98 °F (36 7 °C)   SpO2:        Notes/comments:   - Fetal and maternal status reassuring  Patient has now been pushing for 1 hour  Fetal vertex remains at +1 station  Initial pushing efforts were sub-optimal, in part due to dense epidural and difficulty timing pushes with contractions  Now pushing more effectively with coaching  Will continue optimistic for ARELIS Parisi 10/25/2022 6:01 PM

## 2022-10-25 NOTE — ANESTHESIA POSTPROCEDURE EVALUATION
Post-Op Assessment Note    CV Status:  Stable  Pain Score: 0    Pain management: adequate     Mental Status:  Alert and awake   Hydration Status:  Euvolemic and stable   PONV Controlled:  None   Airway Patency:  Patent      Post Op Vitals Reviewed: Yes      Staff: Anesthesiologist     Post-op block assessment: site cleaned and catheter intact      No complications documented      BP      Temp      Pulse     Resp      SpO2

## 2022-10-25 NOTE — ANESTHESIA PREPROCEDURE EVALUATION
Procedure:  LABOR ANALGESIA    Relevant Problems   CARDIO   (+) Gestational hypertension w/o significant proteinuria in 3rd trimester      ENDO   (+) Pregnancy complicated by pre-existing type 2 diabetes in third trimester      GYN   (+) 38 weeks gestation of pregnancy      NEURO/PSYCH   (+) Depression affecting pregnancy   (+) KY (generalized anxiety disorder)   (+) Moderate recurrent major depression ([facility] [address] )      Other   (+) Obesity affecting pregnancy, antepartum   (+) Prediabetes        Physical Exam    Airway    Mallampati score: II  TM Distance: >3 FB  Neck ROM: full     Dental   No notable dental hx     Cardiovascular      Pulmonary      Other Findings        Anesthesia Plan  ASA Score- 2     Anesthesia Type- epidural with ASA Monitors  Additional Monitors:   Airway Plan:           Plan Factors-    Chart reviewed  Existing labs reviewed  Patient summary reviewed  Patient is not a current smoker  Induction-     Postoperative Plan-     Informed Consent- Anesthetic plan and risks discussed with patient

## 2022-10-25 NOTE — OB LABOR/OXYTOCIN SAFETY PROGRESS
Labor Progress Note - Layoe Box 34 y o  female MRN: [id number]    Unit/Bed#: -01 Encounter: [id number]    Dose (nba-units/min) Oxytocin: 6 nba-units/min  Contraction Frequency (minutes): 2-4 (difficulty tracing related to maternal movement)  Contraction Quality: Mild  Tachysystole: No   Cervical Dilation: 6        Cervical Effacement: 80  Fetal Station: -1  Baseline Rate: 135 bpm  Fetal Heart Rate: 130 BPM  FHR Category: Category I               Vital Signs:   Vitals:    10/25/22 0300   BP: 156/89   Pulse:    Resp:    Temp:        Notes/comments: Contractions stronger since AROM  No cervical change   Requesting [facility] 10/25/2022 3:34 AM

## 2022-10-25 NOTE — QUICK NOTE
OB HOspitalist on call    Called to see patient for severe range Bps - patient has been pushing for 2 hours  Is being induced for pre-eclampsia without severe features  /77   Pulse 90   Temp 98 °F (36 7 °C) (Oral)   Resp 18   Ht 5' 3" (1 6 m)   Wt 105 kg (230 lb 12 8 oz)   LMP 01/15/2022   SpO2 97%   BMI 40 88 kg/m²     Given labetalol 20mg IV - repeat BP in 10 mins still with systolic above 228  Just given 40mg labetalol IV and will repeat dose in 10 minutes  Magnesium sulfate ordered and 4g bolus infusing now  Then 2g/hr  Check labs now  Fetal status remains reassuring  Patient is pushing well with some descent of fetal head to +1 station  Dr Krys Saba delivering in Triage 1 - will d/w him once he is finished with delivery  Madhuri LLANOS [facility] MD  OB/GYN Hospitalist  [phone]  10/25/2022   7:54 PM

## 2022-10-25 NOTE — ANESTHESIA PROCEDURE NOTES
Epidural Block    Patient location during procedure: OB  Start time: 10/25/2022 7:03 AM  Staffing  Performed: Anesthesiologist   Anesthesiologist: Jessica Rodriguez, DO  Preanesthetic Checklist  Completed: patient identified, IV checked, site marked, risks and benefits discussed, surgical consent, monitors and equipment checked, pre-op evaluation and timeout performed  Epidural  Patient position: sitting  Prep: ChloraPrep  Patient monitoring: heart rate, continuous pulse ox and frequent blood pressure checks  Approach: midline  Location: lumbar  Injection technique: DEREK air  Needle  Needle type: Tuohy   Needle gauge: 17 G  Catheter type: side hole  Catheter size: 19 G  Catheter at skin depth: 12 cm  Catheter securement method: surgical tape  Test dose: negativelidocaine (PF) (XYLOCAINE-MPF) 1 % - Infiltration   5 mL - 10/25/2022 7:05:00 AM  bupivacaine (PF) (MARCAINE) 0 25 % - Epidural   5 mL - 10/25/2022 7:08:00 AM  Assessment  Sensory level: T10  Number of attempts: 1negative aspiration for CSF, negative aspiration for heme and no paresthesia on injection  patient tolerated the procedure well with no immediate complications

## 2022-10-25 NOTE — OB LABOR/OXYTOCIN SAFETY PROGRESS
Labor Progress Note - Josy Gore 34 y o  female MRN: [id number]    Unit/Bed#: -01 Encounter: [id number]    Dose (nba-units/min) Oxytocin: 2 nba-units/min  Contraction Frequency (minutes): 2-5  Contraction Quality: Mild  Tachysystole: No   Cervical Dilation: 6-7        Cervical Effacement: 80  Fetal Station: -1  Baseline Rate: 135 bpm  Fetal Heart Rate: 130 BPM  FHR Category: Category I               Vital Signs:   Vitals:    10/24/22 2158   BP: 140/81   Pulse: 100   Resp:    Temp:        Notes/comments: AROM for clear fluid  Tolerating contractions well   Declines pain medication at this time        [facility] 10/25/2022 2:25 AM

## 2022-10-25 NOTE — OB LABOR/OXYTOCIN SAFETY PROGRESS
Oxytocin Safety Progress Check Note - Dash Chung 34 y o  female MRN: [id number]    Unit/Bed#: -01 Encounter: [id number]    Dose (nba-units/min) Oxytocin: 30 nba-units/min  Contraction Frequency (minutes): 2-3  Contraction Quality: Moderate  Tachysystole: No   Cervical Dilation: 10  Dilation Complete Date: 10/25/22  Dilation Complete Time: 1655  Cervical Effacement: 100  Fetal Station: 1  Baseline Rate: 145 bpm  Fetal Heart Rate: 130 BPM  FHR Category: Category I           Vital Signs:   Vitals:    10/25/22 1633   BP: 130/56   Pulse: 103   Resp:    Temp:    SpO2:        Notes/comments:   - Complete and +1 station  Modest effort with initial pushes  Will continue pushing  Optimistic for           Karla Magallon 10/25/2022 5:08 PM Negative

## 2022-10-25 NOTE — OB LABOR/OXYTOCIN SAFETY PROGRESS
Oxytocin Safety Progress Check Note - Lauri Comment 34 y o  female MRN: [id number]    Unit/Bed#: -01 Encounter: [id number]    Dose (nba-units/min) Oxytocin: 22 nba-units/min  Contraction Frequency (minutes): 2-2 5  Contraction Quality: Moderate  Tachysystole: No   Cervical Dilation: 8        Cervical Effacement: 90  Fetal Station: -1  Baseline Rate: 150 bpm  Fetal Heart Rate: 130 BPM  FHR Category: Category I           Vital Signs:  Vitals:    10/25/22 1019   BP: 105/53   Pulse: 105   Resp:    Temp:    SpO2:        Notes/comments:   - Periods of minimal variability noted, but currently moderate variability with positive response to incidental scalp stimulation with exam  Maternal status remains reassuring    - Reviewed protracted active phase of labor with patient, including potential need for  delivery  Given cervical change, will continue pitocin titration and frequent maternal repositioning  Optimistic for        Garth Wylie 10/25/2022 10:45 AM

## 2022-10-25 NOTE — OB LABOR/OXYTOCIN SAFETY PROGRESS
Labor Progress Note - Ressie Jonnie 34 y o  female MRN: [id number]    Unit/Bed#: -01 Encounter: [id number]    Dose (nba-units/min) Oxytocin: 2 nba-units/min  Contraction Frequency (minutes): 2 5-4  Contraction Quality: Mild  Tachysystole: No   Cervical Dilation: 6        Cervical Effacement: 70  Fetal Station: -2  Baseline Rate: 140 bpm (weak signal tracing)     FHR Category: Category I               Vital Signs:   Vitals:    10/24/22 2158   BP: 140/81   Pulse: 100   Resp:    Temp:        Notes/comments: Still too high for AROM  Starting to feel some contractions  Declines pain medication at this time  Recent   Will continue to follow          [facility] 10/25/2022 12:16 AM

## 2022-10-25 NOTE — OB LABOR/OXYTOCIN SAFETY PROGRESS
Oxytocin Safety Progress Check Note - Laci Kaye 34 y o  female MRN: [id number]    Unit/Bed#: -01 Encounter: [id number]    Dose (nba-units/min) Oxytocin: 28 nba-units/min  Contraction Frequency (minutes): 2-3  Contraction Quality: Moderate  Tachysystole: No   Cervical Dilation: 8        Cervical Effacement: 90  Fetal Station: -1  Baseline Rate: 150 bpm  Fetal Heart Rate: 130 BPM  FHR Category: Category I             Vital Signs:   Vitals:    10/25/22 1234   BP: 128/81   Pulse: (!) 108   Resp:    Temp:    SpO2:        Notes/comments:   - Fetal and maternal status remain reassuring  Areas of minimal FHR variability, but positive response to incidental scalp stimulation with exam  Present accelerations, no decelerations  - Cervical dilation unchanged, although cervical tissue now predominantly on maternal right  Fetal position LOT  Will continue maternal repositioning to facilitate fetal rotation and descent           Melania Almaraz 10/25/2022 12:51 PM

## 2022-10-25 NOTE — OB LABOR/OXYTOCIN SAFETY PROGRESS
Labor Progress Note - Namita Richmond 34 y o  female MRN: [id number]    Unit/Bed#:  TRIAGE 2-01 Encounter: [id number]       Contraction Frequency (minutes): 0 (irritability)  Contraction Quality: Not applicable  Tachysystole: No   Cervical Dilation: 5        Cervical Effacement: 70  Fetal Station: -2  Baseline Rate: 135 bpm     FHR Category: Category I               Vital Signs:   Vitals:    10/24/22 1934   BP: 159/77   Pulse: 102   Resp:    Temp:        Notes/comments: Ripening balloon fell out  Cervix is now favorable  Pt is not having any significant contractions  Once she is moved to an L+D room will begin pitocin for induction  Will monitor glucose Q2 hours until labor onset and then hourly and treat based upon insulin protocol          Saji Grayson [facility] 10/24/2022 8:23 PM

## 2022-10-26 DIAGNOSIS — Z86.32 HISTORY OF GESTATIONAL DIABETES: ICD-10-CM

## 2022-10-26 DIAGNOSIS — Z13.1 DIABETES MELLITUS SCREENING: Primary | ICD-10-CM

## 2022-10-26 LAB
ALBUMIN SERPL BCP-MCNC: 1.9 G/DL (ref 3.5–5)
ALBUMIN SERPL BCP-MCNC: 2 G/DL (ref 3.5–5)
ALBUMIN SERPL BCP-MCNC: 2.1 G/DL (ref 3.5–5)
ALP SERPL-CCNC: 145 U/L (ref 46–116)
ALP SERPL-CCNC: 148 U/L (ref 46–116)
ALP SERPL-CCNC: 149 U/L (ref 46–116)
ALT SERPL W P-5'-P-CCNC: 16 U/L (ref 12–78)
ALT SERPL W P-5'-P-CCNC: 16 U/L (ref 12–78)
ALT SERPL W P-5'-P-CCNC: 18 U/L (ref 12–78)
ANION GAP SERPL CALCULATED.3IONS-SCNC: 10 MMOL/L (ref 4–13)
ANION GAP SERPL CALCULATED.3IONS-SCNC: 14 MMOL/L (ref 4–13)
ANION GAP SERPL CALCULATED.3IONS-SCNC: 5 MMOL/L (ref 4–13)
AST SERPL W P-5'-P-CCNC: 23 U/L (ref 5–45)
AST SERPL W P-5'-P-CCNC: 24 U/L (ref 5–45)
AST SERPL W P-5'-P-CCNC: 26 U/L (ref 5–45)
BACTERIA UR QL AUTO: ABNORMAL /HPF
BILIRUB SERPL-MCNC: 0.1 MG/DL (ref 0.2–1)
BILIRUB SERPL-MCNC: 0.1 MG/DL (ref 0.2–1)
BILIRUB SERPL-MCNC: 0.3 MG/DL (ref 0.2–1)
BILIRUB UR QL STRIP: NEGATIVE
BUN SERPL-MCNC: 12 MG/DL (ref 5–25)
BUN SERPL-MCNC: 14 MG/DL (ref 5–25)
BUN SERPL-MCNC: 15 MG/DL (ref 5–25)
CALCIUM ALBUM COR SERPL-MCNC: 9.4 MG/DL (ref 8.3–10.1)
CALCIUM ALBUM COR SERPL-MCNC: 9.5 MG/DL (ref 8.3–10.1)
CALCIUM ALBUM COR SERPL-MCNC: 9.5 MG/DL (ref 8.3–10.1)
CALCIUM SERPL-MCNC: 7.7 MG/DL (ref 8.3–10.1)
CALCIUM SERPL-MCNC: 7.9 MG/DL (ref 8.3–10.1)
CALCIUM SERPL-MCNC: 8 MG/DL (ref 8.3–10.1)
CHLORIDE SERPL-SCNC: 102 MMOL/L (ref 96–108)
CHLORIDE SERPL-SCNC: 102 MMOL/L (ref 96–108)
CHLORIDE SERPL-SCNC: 104 MMOL/L (ref 96–108)
CLARITY UR: CLEAR
CO2 SERPL-SCNC: 18 MMOL/L (ref 21–32)
CO2 SERPL-SCNC: 22 MMOL/L (ref 21–32)
CO2 SERPL-SCNC: 25 MMOL/L (ref 21–32)
COLOR UR: COLORLESS
CREAT SERPL-MCNC: 1.11 MG/DL (ref 0.6–1.3)
CREAT SERPL-MCNC: 1.13 MG/DL (ref 0.6–1.3)
CREAT SERPL-MCNC: 1.18 MG/DL (ref 0.6–1.3)
ERYTHROCYTE [DISTWIDTH] IN BLOOD BY AUTOMATED COUNT: 14.5 % (ref 11.6–15.1)
ERYTHROCYTE [DISTWIDTH] IN BLOOD BY AUTOMATED COUNT: 14.6 % (ref 11.6–15.1)
ERYTHROCYTE [DISTWIDTH] IN BLOOD BY AUTOMATED COUNT: 14.9 % (ref 11.6–15.1)
GFR SERPL CREATININE-BSD FRML MDRD: 60 ML/MIN/1.73SQ M
GFR SERPL CREATININE-BSD FRML MDRD: 64 ML/MIN/1.73SQ M
GFR SERPL CREATININE-BSD FRML MDRD: 65 ML/MIN/1.73SQ M
GLUCOSE SERPL-MCNC: 192 MG/DL (ref 65–140)
GLUCOSE SERPL-MCNC: 196 MG/DL (ref 65–140)
GLUCOSE SERPL-MCNC: 207 MG/DL (ref 65–140)
GLUCOSE SERPL-MCNC: 217 MG/DL (ref 65–140)
GLUCOSE SERPL-MCNC: 220 MG/DL (ref 65–140)
GLUCOSE SERPL-MCNC: 220 MG/DL (ref 65–140)
GLUCOSE SERPL-MCNC: 230 MG/DL (ref 65–140)
GLUCOSE SERPL-MCNC: 245 MG/DL (ref 65–140)
GLUCOSE SERPL-MCNC: 263 MG/DL (ref 65–140)
GLUCOSE SERPL-MCNC: 302 MG/DL (ref 65–140)
GLUCOSE UR STRIP-MCNC: NEGATIVE MG/DL
HCT VFR BLD AUTO: 23.4 % (ref 34.8–46.1)
HCT VFR BLD AUTO: 25.4 % (ref 34.8–46.1)
HCT VFR BLD AUTO: 26.1 % (ref 34.8–46.1)
HGB BLD-MCNC: 7.6 G/DL (ref 11.5–15.4)
HGB BLD-MCNC: 8.2 G/DL (ref 11.5–15.4)
HGB BLD-MCNC: 8.4 G/DL (ref 11.5–15.4)
HGB UR QL STRIP.AUTO: ABNORMAL
KETONES UR STRIP-MCNC: ABNORMAL MG/DL
LEUKOCYTE ESTERASE UR QL STRIP: NEGATIVE
MAGNESIUM SERPL-MCNC: 3.9 MG/DL (ref 1.6–2.6)
MAGNESIUM SERPL-MCNC: 4.2 MG/DL (ref 1.6–2.6)
MAGNESIUM SERPL-MCNC: 4.2 MG/DL (ref 1.6–2.6)
MCH RBC QN AUTO: 31.5 PG (ref 26.8–34.3)
MCH RBC QN AUTO: 32.2 PG (ref 26.8–34.3)
MCH RBC QN AUTO: 32.3 PG (ref 26.8–34.3)
MCHC RBC AUTO-ENTMCNC: 32.2 G/DL (ref 31.4–37.4)
MCHC RBC AUTO-ENTMCNC: 32.3 G/DL (ref 31.4–37.4)
MCHC RBC AUTO-ENTMCNC: 32.5 G/DL (ref 31.4–37.4)
MCV RBC AUTO: 100 FL (ref 82–98)
MCV RBC AUTO: 98 FL (ref 82–98)
MCV RBC AUTO: 99 FL (ref 82–98)
NITRITE UR QL STRIP: NEGATIVE
NON-SQ EPI CELLS URNS QL MICRO: ABNORMAL /HPF
PH UR STRIP.AUTO: 5 [PH]
PLATELET # BLD AUTO: 145 THOUSANDS/UL (ref 149–390)
PLATELET # BLD AUTO: 156 THOUSANDS/UL (ref 149–390)
PLATELET # BLD AUTO: 166 THOUSANDS/UL (ref 149–390)
PMV BLD AUTO: 11.1 FL (ref 8.9–12.7)
PMV BLD AUTO: 11.3 FL (ref 8.9–12.7)
PMV BLD AUTO: 11.5 FL (ref 8.9–12.7)
POTASSIUM SERPL-SCNC: 4.1 MMOL/L (ref 3.5–5.3)
POTASSIUM SERPL-SCNC: 4.3 MMOL/L (ref 3.5–5.3)
POTASSIUM SERPL-SCNC: 4.5 MMOL/L (ref 3.5–5.3)
PROT SERPL-MCNC: 5.1 G/DL (ref 6.4–8.4)
PROT SERPL-MCNC: 5.1 G/DL (ref 6.4–8.4)
PROT SERPL-MCNC: 5.3 G/DL (ref 6.4–8.4)
PROT UR STRIP-MCNC: NEGATIVE MG/DL
RBC # BLD AUTO: 2.36 MILLION/UL (ref 3.81–5.12)
RBC # BLD AUTO: 2.54 MILLION/UL (ref 3.81–5.12)
RBC # BLD AUTO: 2.67 MILLION/UL (ref 3.81–5.12)
RBC #/AREA URNS AUTO: ABNORMAL /HPF
SODIUM SERPL-SCNC: 134 MMOL/L (ref 135–147)
SP GR UR STRIP.AUTO: 1.02 (ref 1–1.03)
URATE SERPL-MCNC: 7.9 MG/DL (ref 2–7.5)
URATE SERPL-MCNC: 8 MG/DL (ref 2–7.5)
URATE SERPL-MCNC: 8.1 MG/DL (ref 2–7.5)
UROBILINOGEN UR STRIP-ACNC: <2 MG/DL
WBC # BLD AUTO: 23.11 THOUSAND/UL (ref 4.31–10.16)
WBC # BLD AUTO: 28.06 THOUSAND/UL (ref 4.31–10.16)
WBC # BLD AUTO: 28.11 THOUSAND/UL (ref 4.31–10.16)
WBC #/AREA URNS AUTO: ABNORMAL /HPF

## 2022-10-26 RX ORDER — INSULIN LISPRO 100 [IU]/ML
11 INJECTION, SOLUTION INTRAVENOUS; SUBCUTANEOUS ONCE
Status: COMPLETED | OUTPATIENT
Start: 2022-10-27 | End: 2022-10-27

## 2022-10-26 RX ORDER — INSULIN LISPRO 100 [IU]/ML
1-6 INJECTION, SOLUTION INTRAVENOUS; SUBCUTANEOUS
Status: DISCONTINUED | OUTPATIENT
Start: 2022-10-26 | End: 2022-10-28 | Stop reason: HOSPADM

## 2022-10-26 RX ORDER — ACETAMINOPHEN 325 MG/1
650 TABLET ORAL EVERY 6 HOURS PRN
Status: DISCONTINUED | OUTPATIENT
Start: 2022-10-26 | End: 2022-10-27

## 2022-10-26 RX ADMIN — SODIUM CHLORIDE, SODIUM LACTATE, POTASSIUM CHLORIDE, AND CALCIUM CHLORIDE 125 ML/HR: .6; .31; .03; .02 INJECTION, SOLUTION INTRAVENOUS at 23:54

## 2022-10-26 RX ADMIN — AZITHROMYCIN MONOHYDRATE 500 MG: 500 INJECTION, POWDER, LYOPHILIZED, FOR SOLUTION INTRAVENOUS at 22:16

## 2022-10-26 RX ADMIN — INSULIN LISPRO 4 UNITS: 100 INJECTION, SOLUTION INTRAVENOUS; SUBCUTANEOUS at 10:26

## 2022-10-26 RX ADMIN — SERTRALINE HYDROCHLORIDE 50 MG: 50 TABLET ORAL at 08:47

## 2022-10-26 RX ADMIN — INSULIN LISPRO 3 UNITS: 100 INJECTION, SOLUTION INTRAVENOUS; SUBCUTANEOUS at 06:42

## 2022-10-26 RX ADMIN — ACETAMINOPHEN 650 MG: 325 TABLET, FILM COATED ORAL at 06:33

## 2022-10-26 RX ADMIN — SODIUM CHLORIDE 200 MG: 900 INJECTION, SOLUTION INTRAVENOUS at 09:23

## 2022-10-26 RX ADMIN — CALCIUM CARBONATE (ANTACID) CHEW TAB 500 MG 1000 MG: 500 CHEW TAB at 08:47

## 2022-10-26 RX ADMIN — ACETAMINOPHEN 650 MG: 325 TABLET, FILM COATED ORAL at 17:44

## 2022-10-26 RX ADMIN — ACETAMINOPHEN 650 MG: 325 TABLET, FILM COATED ORAL at 13:13

## 2022-10-26 RX ADMIN — SODIUM CHLORIDE, SODIUM LACTATE, POTASSIUM CHLORIDE, AND CALCIUM CHLORIDE 125 ML/HR: .6; .31; .03; .02 INJECTION, SOLUTION INTRAVENOUS at 09:23

## 2022-10-26 RX ADMIN — INSULIN LISPRO 3 UNITS: 100 INJECTION, SOLUTION INTRAVENOUS; SUBCUTANEOUS at 13:59

## 2022-10-26 RX ADMIN — AMPICILLIN SODIUM AND SULBACTAM SODIUM 3 G: 2; 1 INJECTION, POWDER, FOR SOLUTION INTRAMUSCULAR; INTRAVENOUS at 21:10

## 2022-10-26 RX ADMIN — INSULIN LISPRO 2 UNITS: 100 INJECTION, SOLUTION INTRAVENOUS; SUBCUTANEOUS at 17:45

## 2022-10-26 RX ADMIN — AMPICILLIN SODIUM AND SULBACTAM SODIUM 3 G: 2; 1 INJECTION, POWDER, FOR SOLUTION INTRAMUSCULAR; INTRAVENOUS at 15:06

## 2022-10-26 RX ADMIN — MAGNESIUM SULFATE HEPTAHYDRATE 1 G/HR: 40 INJECTION, SOLUTION INTRAVENOUS at 13:14

## 2022-10-26 NOTE — PLAN OF CARE
Problem: BIRTH - VAGINAL/ SECTION  Goal: Fetal and maternal status remain reassuring during the birth process  Description: INTERVENTIONS:  - Monitor vital signs  - Monitor fetal heart rate  - Monitor uterine activity  - Monitor labor progression (vaginal delivery)  - DVT prophylaxis  - Antibiotic prophylaxis  Outcome: Progressing  Goal: Emotionally satisfying birthing experience for mother/fetus  Description: Interventions:  - Assess, plan, implement and evaluate the nursing care given to the patient in labor  - Advocate the philosophy that each childbirth experience is a unique experience and support the family's chosen level of involvement and control during the labor process   - Actively participate in both the patient's and family's teaching of the birth process  - Consider cultural, [employer] and age-specific factors and plan care for the patient in labor  Outcome: Progressing     Problem: POSTPARTUM  Goal: Experiences normal postpartum course  Description: INTERVENTIONS:  - Monitor maternal vital signs  - Assess uterine involution and lochia  Outcome: Progressing  Goal: Appropriate maternal -  bonding  Description: INTERVENTIONS:  - Identify family support  - Assess for appropriate maternal/infant bonding   -Encourage maternal/infant bonding opportunities  - Referral to  or  as needed  Outcome: Progressing  Goal: Establishment of infant feeding pattern  Description: INTERVENTIONS:  - Assess breast/bottle feeding  - Refer to lactation as needed  Outcome: Progressing  Goal: Incision(s), wounds(s) or drain site(s) healing without S/S of infection  Description: INTERVENTIONS  - Assess and document dressing, incision, wound bed, drain sites and surrounding tissue  - Provide patient and family education    Outcome: Progressing     Problem: PAIN - ADULT  Goal: Verbalizes/displays adequate comfort level or baseline comfort level  Description: Interventions:  - Encourage patient to monitor pain and request assistance  - Assess pain using appropriate pain scale  - Administer analgesics based on type and severity of pain and evaluate response  - Implement non-pharmacological measures as appropriate and evaluate response  - Consider cultural and social influences on pain and pain management  - Notify physician/advanced practitioner if interventions unsuccessful or patient reports new pain  Outcome: Progressing     Problem: DISCHARGE PLANNING  Goal: Discharge to home or other facility with appropriate resources  Description: INTERVENTIONS:  - Identify barriers to discharge w/patient and caregiver  - Arrange for needed discharge resources and transportation as appropriate  - Identify discharge learning needs (meds, wound care, etc )  - Arrange for interpretive services to assist at discharge as needed  - Refer to Case Management Department for coordinating discharge planning if the patient needs post-hospital services based on physician/advanced practitioner order or complex needs related to functional status, cognitive ability, or social support system  Outcome: Progressing     Problem: Potential for Falls  Goal: Patient will remain free of falls  Description: INTERVENTIONS:  - Educate patient/family on patient safety including physical limitations  - Instruct patient to call for assistance with activity   - Consult OT/PT to assist with strengthening/mobility   - Keep Call bell within reach  - Keep bed low and locked with side rails adjusted as appropriate  - Keep care items and personal belongings within reach  - Initiate and maintain comfort rounds    Outcome: Progressing

## 2022-10-26 NOTE — OP NOTE
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   4  Arrest of descent    Post-op Diagnosis: Same, delivered    Procedure: STAT primary low-transverse  section via Pfannenstiel skin incision    Specimen(s):  ID Type Source Tests Collected by Time Destination   1 :  Tissue (Placenta on Hold) OB Only Placenta TISSUE EXAM OB (PLACENTA) ONLY Danielle Dalal MD 10/25/2022 2228        Quantitative blood loss: 2220 mL    Drains:  Urethral Catheter Non-latex 16 Fr  (Active)   Reasons to continue Urinary Catheter  Accurate I&O assessment in critically ill patients (48 hr  max) 10/25/22 1518   Goal for Removal Voiding trial when ambulation improves 10/25/22 1518   Site Assessment Clean;Skin intact 10/25/22 1518   Duarte Care Done 10/25/22 1020   Collection Container Standard drainage bag 10/25/22 1518   Securement Method Securing device (Describe) 10/25/22 1401   Output (mL) 125 mL 10/25/22 1401   Number of days: 0     Anesthesia Type: Epidural    Operative Indications:   Samina Aguilar is a 34 y o  Roxana Ducking at 38w3d for primary  section for arrest of descent after 4 hours of pushing  All risks, benefits, and alternatives were discussed with the patient  All questions were answered  The patient agreed to proceed with surgery  After administration of epidural bolus in OR, prolonged fetal heart rate deceleration with kayode in the 90s was noted and decision was therefore made to proceed with delivery in STAT fashion  Operative Findings:  1   Live infant delivered from occiput posterior position through clear fluid at 21:42, weight 3900 grams, Apgar scores of  4 and 7 at 1 and 5 minutes, respectively  No nuchal cord  2  Intact placenta delivered via fundal massage, 3-vessel cord noted  3  Incidental right inferior extension of the hysterotomy involving a branch of right uterine vessels  4  Persistent uterine atony requiring additional uterotonic agents and B-Montoya suture, as noted below  5  Normal appearing bilateral fallopian tubes and ovaries  Complications: Obstetric hemorrhage due to atony and right inferior extension of hysterotomy involving uterine vessels  Atony was treated with additional 20 units pitocin (total 50 units), carboprost 250 mcg IM x 1, and B-Montoya suture  Tranexamic acid 1000 mg IV was also  administered for additional hemostasis  Use of the Fetal Pillow device was intended, but placement was not attempted due to need for expedited STAT delivery due to concern for fetal wellbeing in OR  Procedure and Technique:    The patient was taken to the operating room, where a bolus of epidural anesthesia was administered  The patient was then placed in supine position with a leftward tilt  She was prepped and draped in the normal sterile fashion  When anesthesia was found to be adequate, a Pfannenstiel skin incision was made with a scalpel in STAT fashion and carried down to the underlying layer of fascia  The fascia was then incised with a scalpel and bluntly extended laterally and superiorly  The rectus muscles were then  in the midline, and the peritoneum was entered bluntly  The peritoneal incision was then extended superiorly, inferiorly, and laterally using blunt dissection with excellent visualization of the bladder  When adequate exposure had been achieved, the bladder blade was then placed  A low transverse uterine incision was then made with a scalpel  The uterus was entered bluntly and the hysterotomy was extended bluntly in a cephalad-caudad manner   The infant was then delivered in vertes presentation from occiput posterior position  The cord was doubly clamped and cut without delay in order to allow for immediate evaluation by the  resuscitation team  Cord blood and gases were collected and the placenta was delivered with fundal massage noted to be intact with 3-vessel cord  The uterus was then exteriorized and cleared of all clots and debris  The hysterotomy was then closed with 0-Vicryl suture in a running locked fashion  The right inferior extension of the hysterotomy was closed separately with an additional 0-Vicryl suture, taking care to incorporate the bleeding branch of uterine vessels  A second layer of the same suture was used in a imbricating fashion in order to obtain excellent hemostasis  Persistent uterine atony was noted despite administration of uterotonic and hemostatic agents, as listed above  Decision was therefore made to proceed with placement of B-Montoya suture, which was performed in traditional fashion with 0-Chromic suture with good response  The uterus was then returned to the abdomen, and the gutters were cleared of all clots and debris  The hysterotomy was inspected and noted to be hemostatic  The fascia was then closed with 0 Vicryl suture in a running fashion  The deep subcutaneous tissue was then irrigated and closed with 3-0 Vicryl suture in a running fashion  The skin was then closed with 4-0 Vicryl suture in subcuticular fashion  The patient tolerated the procedure well  Sponge, lap, and needle counts were correct x 3  The patient was taken to the Recovery Room in stable condition      I was present for the entire procedure, A qualified resident physician was not available and A co-surgeon was required because of skills and techniques relevant to speciality    Patient Disposition:  PACU     SIGNATURE: Anayeli Julien  DATE: 2022  TIME: 7:54 AM

## 2022-10-26 NOTE — ASSESSMENT & PLAN NOTE
Lab Results   Component Value Date    HGBA1C 6 4 (H) 08/15/2022       Recent Labs     10/26/22  0640 10/26/22  0712 10/26/22  1022 10/26/22  1356   POCGLU 230* 196* 302* 263*       Blood Sugar Average: Last 72 hrs:  (P) 137 [id number]     Patient is currently on sliding scale  Glucose has been significantly elevated  She does have an elevated WBC and it is unclear if this is related to an infectious process and would be affecting her glucose   Will request endocrine evaluation

## 2022-10-26 NOTE — LACTATION NOTE
This note was copied from a baby's chart  Met with mother to discuss feeding plan  Mother discussed that she wanted to breastfeed but began supplementing as hand expressed colostrum was not sufficient for blood sugars  Discussed the importance of frequent stimulation to the breasts to protect her milk supply until baby stabilizes to work on latch/positioning  Mother agreed to get started with pumping  A double breast pump was brought at the bedside and mother was started with pumping discussing stimulation vs expression as well  Pump was set at 60 cycles with 20% suction and mother reported comfort  Mother was encouraged to pump prior, during and/or after feeding times to keep up with feeding schedule and frequency  She was given a pumping log as well as a handout on "Increasing Milk Supply" encouraged her to do breast compressions during pumping and hand expression afterward to help remove colostrum more effectively  Addressed breast pump needs and mother discussed that she has a Spectra S2 breast pump for home use  Mother was encouraged to attempt to latch for feeding to continue to practice when baby is actively cueing  A cleaning station was set up in room, discussing how to to clean the pump parts and milk storage  The Ready, Set, Baby Booklet was discussed  Discussed importance of skin to skin to help baby awaken for breastfeeding, to help with milk production as well as stabilize temperature, blood sugars, decrease pain, promote relaxation, and calm the baby as well as for bonding that father may do as well  Explained feeding cues and fullness cues as well as importance of obtaining a deep latch for effective milk removal and proper positioning (tummy to tummy, at level, nose to nipple, bring chin to breast first and bringing baby to breast) with ear, shoulder, and hip alignment  Demonstrated on breast model how to hold, compress and perform hand expression      Mother was made aware of how to communicate with lactation and encouraged to reach out for continued support and/or questions that arise

## 2022-10-26 NOTE — PLAN OF CARE
Problem: BIRTH - VAGINAL/ SECTION  Goal: Fetal and maternal status remain reassuring during the birth process  Description: INTERVENTIONS:  - Monitor vital signs  - Monitor fetal heart rate  - Monitor uterine activity  - Monitor labor progression (vaginal delivery)  - DVT prophylaxis  - Antibiotic prophylaxis  Outcome: Progressing  Goal: Emotionally satisfying birthing experience for mother/fetus  Description: Interventions:  - Assess, plan, implement and evaluate the nursing care given to the patient in labor  - Advocate the philosophy that each childbirth experience is a unique experience and support the family's chosen level of involvement and control during the labor process   - Actively participate in both the patient's and family's teaching of the birth process  - Consider cultural, Faith and age-specific factors and plan care for the patient in labor  Outcome: Progressing     Problem: POSTPARTUM  Goal: Experiences normal postpartum course  Description: INTERVENTIONS:  - Monitor maternal vital signs  - Assess uterine involution and lochia  Outcome: Progressing  Goal: Appropriate maternal -  bonding  Description: INTERVENTIONS:  - Identify family support  - Assess for appropriate maternal/infant bonding   -Encourage maternal/infant bonding opportunities  - Referral to  or  as needed  Outcome: Progressing  Goal: Establishment of infant feeding pattern  Description: INTERVENTIONS:  - Assess breast/bottle feeding  - Refer to lactation as needed  Outcome: Progressing  Goal: Incision(s), wounds(s) or drain site(s) healing without S/S of infection  Description: INTERVENTIONS  - Assess and document dressing, incision, wound bed, drain sites and surrounding tissue  - Provide patient and family education  - Perform skin care/dressing changes every   Outcome: Progressing     Problem: Knowledge Deficit  Goal: Verbalizes understanding of labor plan  Description: Assess patient/family/caregiver's baseline knowledge level and ability to understand information  Provide education via patient/family/caregiver's preferred learning method at appropriate level of understanding  1  Provide teaching at level of understanding  2  Provide teaching via preferred learning method(s)  Outcome: Completed  Goal: Patient/family/caregiver demonstrates understanding of disease process, treatment plan, medications, and discharge instructions  Description: Complete learning assessment and assess knowledge base    Interventions:  - Provide teaching at level of understanding  - Provide teaching via preferred learning methods  Outcome: Completed     Problem: PAIN - ADULT  Goal: Verbalizes/displays adequate comfort level or baseline comfort level  Description: Interventions:  - Encourage patient to monitor pain and request assistance  - Assess pain using appropriate pain scale  - Administer analgesics based on type and severity of pain and evaluate response  - Implement non-pharmacological measures as appropriate and evaluate response  - Consider cultural and social influences on pain and pain management  - Notify physician/advanced practitioner if interventions unsuccessful or patient reports new pain  Outcome: Progressing     Problem: DISCHARGE PLANNING  Goal: Discharge to home or other facility with appropriate resources  Description: INTERVENTIONS:  - Identify barriers to discharge w/patient and caregiver  - Arrange for needed discharge resources and transportation as appropriate  - Identify discharge learning needs (meds, wound care, etc )  - Arrange for interpretive services to assist at discharge as needed  - Refer to Case Management Department for coordinating discharge planning if the patient needs post-hospital services based on physician/advanced practitioner order or complex needs related to functional status, cognitive ability, or social support system  Outcome: Progressing     Problem: Potential for Falls  Goal: Patient will remain free of falls  Description: INTERVENTIONS:  - Educate patient/family on patient safety including physical limitations  - Instruct patient to call for assistance with activity   - Consult OT/PT to assist with strengthening/mobility   - Keep Call bell within reach  - Keep bed low and locked with side rails adjusted as appropriate  - Keep care items and personal belongings within reach  - Initiate and maintain comfort rounds  - Make Fall Risk Sign visible to staff  - Offer Toileting every  Hours, in advance of need  - Initiate/Maintain alarm  - Obtain necessary fall risk management equipment  - Apply yellow socks and bracelet for high fall risk patients  - Consider moving patient to room near nurses station  Outcome: Progressing

## 2022-10-26 NOTE — PROGRESS NOTES
Progress Note - OB/GYN  Post-Partum Physician Note   Rylee Mcgee 34 y o  female MRN: [id number]  Unit/Bed#:  206-01 Encounter: [id number]    Patient is postop and postpartum day 1 from a  (arrest of descent after pushing for 4 hours) with epidural anesthesia      Subjective:   Pain: no  Tolerating Oral Intake:2 yes  Voiding: damian in place  Flatus: no  Bowel Movement: no  Ambulating: yes  Breastfeeding: Using breast pump  Chest Pain: no  Shortness of Breath: no  Leg Pain/Discomfort: no  Lochia: minimal  Other:     Objective:   Vitals:    10/26/22 0632 10/26/22 0700 10/26/22 1159 10/26/22 1427   BP:  111/60 123/64 121/70   BP Location:  Left arm Left arm    Pulse:  (!) 112 (!) 114 (!) 113   Resp:  18 18 18   Temp: 98 2 °F (36 8 °C) 97 8 °F (36 6 °C) 98 5 °F (36 9 °C) 98 5 °F (36 9 °C)   TempSrc: Oral Temporal Oral Oral   SpO2:  95% 98% 94%   Weight:       Height:           Intake/Output Summary (Last 24 hours) at 10/26/2022 1538  Last data filed at 10/26/2022 1500  Gross per 24 hour   Intake 4750 ml   Output 6345 ml   Net -1595 ml       Physical Exam:  General: in no apparent distress, well developed and well nourished, non-toxic, in no respiratory distress and acyanotic, alert, oriented times 3, afebrile and normal vitals  Abdomen: abdomen is soft without significant tenderness, masses, organomegaly or guarding  Fundus: Firm and non-tender, 1 below the umbilicus  Incision: C/D/I  Lower extremeties: nontender  Other:     Labs/Tests:   Recent Results (from the past 24 hour(s))   Fingerstick Glucose (POCT)    Collection Time: 10/25/22  4:59 PM   Result Value Ref Range    POC Glucose 76 65 - 140 mg/dl   Fingerstick Glucose (POCT)    Collection Time: 10/25/22  7:10 PM   Result Value Ref Range    POC Glucose 100 65 - 140 mg/dl   Fingerstick Glucose (POCT)    Collection Time: 10/25/22  9:08 PM   Result Value Ref Range    POC Glucose 113 65 - 140 mg/dl   CBC    Collection Time: 10/25/22  9:09 PM   Result Value Ref Range    WBC 19 98 (H) 4 31 - 10 16 Thousand/uL    RBC 3 77 (L) 3 81 - 5 12 Million/uL    Hemoglobin 12 1 11 5 - 15 4 g/dL    Hematocrit 37 1 34 8 - 46 1 %    MCV 98 82 - 98 fL    MCH 32 1 26 8 - 34 3 pg    MCHC 32 6 31 4 - 37 4 g/dL    RDW 14 4 11 6 - 15 1 %    Platelets 532 084 - 994 Thousands/uL    MPV 11 5 8 9 - 12 7 fL   Comprehensive metabolic panel    Collection Time: 10/25/22  9:09 PM   Result Value Ref Range    Sodium 133 (L) 135 - 147 mmol/L    Potassium 4 1 3 5 - 5 3 mmol/L    Chloride 100 96 - 108 mmol/L    CO2 16 (L) 21 - 32 mmol/L    ANION GAP 17 (H) 4 - 13 mmol/L    BUN 18 5 - 25 mg/dL    Creatinine 1 33 (H) 0 60 - 1 30 mg/dL    Glucose 112 65 - 140 mg/dL    Calcium 8 7 8 3 - 10 1 mg/dL    Corrected Calcium 10 1 8 3 - 10 1 mg/dL    AST 22 5 - 45 U/L    ALT 19 12 - 78 U/L    Alkaline Phosphatase 232 (H) 46 - 116 U/L    Total Protein 6 3 (L) 6 4 - 8 4 g/dL    Albumin 2 2 (L) 3 5 - 5 0 g/dL    Total Bilirubin 0 40 0 20 - 1 00 mg/dL    eGFR 52 ml/min/1 73sq m   Magnesium    Collection Time: 10/25/22  9:09 PM   Result Value Ref Range    Magnesium 3 8 (H) 1 6 - 2 6 mg/dL   Uric acid    Collection Time: 10/25/22  9:09 PM   Result Value Ref Range    Uric Acid 7 5 2 0 - 7 5 mg/dL   CORD, Blood gas, arterial    Collection Time: 10/25/22  9:45 PM   Result Value Ref Range    pH, Cord Art 7 129 (L) 7 230 - 7 430    pCO2, Cord Art 57 3 30 0 - 60 0    pO2, Cord Art 12 1 5 0 - 25 0 mm HG    HCO3, Cord Art 18 6 17 3 - 27 3 mmol/L    Base Exc, Cord Art -11 3 (L) 3 0 - 11 0 mmol/L    O2 Content, Cord Art 3 5 ml/dl    O2 Hgb, Arterial Cord 14 7 %   CORD, Blood gas, venous    Collection Time: 10/25/22  9:45 PM   Result Value Ref Range    pH, Cord Fernando 7 175 (L) 7 190 - 7 490    pCO2, Cord Fernando 48 1 (H) 27 0 - 43 0 mm HG    pO2, Cord Fernando 20 1 15 0 - 45 0 mm HG    HCO3, Cord Fernando 17 3 12 2 - 28 6 mmol/L    Base Exc, Cord Fernando -11 2 (L) 1 0 - 9 0 mmol/L    O2 Cont, Cord Fernando 9 0 mL/dL    O2 HGB,VENOUS CORD 37 4 % Magnesium    Collection Time: 10/25/22 10:35 PM   Result Value Ref Range    Magnesium 3 4 (H) 1 6 - 2 6 mg/dL   Fingerstick Glucose (POCT)    Collection Time: 10/26/22  5:54 AM   Result Value Ref Range    POC Glucose 217 (H) 65 - 140 mg/dl   Fingerstick Glucose (POCT)    Collection Time: 10/26/22  6:40 AM   Result Value Ref Range    POC Glucose 230 (H) 65 - 140 mg/dl   Fingerstick Glucose (POCT)    Collection Time: 10/26/22  7:12 AM   Result Value Ref Range    POC Glucose 196 (H) 65 - 140 mg/dl   Comprehensive metabolic panel    Collection Time: 10/26/22  7:21 AM   Result Value Ref Range    Sodium 134 (L) 135 - 147 mmol/L    Potassium 4 5 3 5 - 5 3 mmol/L    Chloride 102 96 - 108 mmol/L    CO2 18 (L) 21 - 32 mmol/L    ANION GAP 14 (H) 4 - 13 mmol/L    BUN 15 5 - 25 mg/dL    Creatinine 1 11 0 60 - 1 30 mg/dL    Glucose 192 (H) 65 - 140 mg/dL    Calcium 8 0 (L) 8 3 - 10 1 mg/dL    Corrected Calcium 9 5 8 3 - 10 1 mg/dL    AST 24 5 - 45 U/L    ALT 16 12 - 78 U/L    Alkaline Phosphatase 149 (H) 46 - 116 U/L    Total Protein 5 1 (L) 6 4 - 8 4 g/dL    Albumin 2 1 (L) 3 5 - 5 0 g/dL    Total Bilirubin 0 30 0 20 - 1 00 mg/dL    eGFR 65 ml/min/1 73sq m   Magnesium    Collection Time: 10/26/22  7:21 AM   Result Value Ref Range    Magnesium 3 9 (H) 1 6 - 2 6 mg/dL   Uric acid    Collection Time: 10/26/22  7:21 AM   Result Value Ref Range    Uric Acid 7 9 (H) 2 0 - 7 5 mg/dL   Urinalysis with microscopic    Collection Time: 10/26/22  7:21 AM   Result Value Ref Range    Color, UA Colorless     Clarity, UA Clear     Specific Gravity, UA 1 020 1 005 - 1 030    pH, UA 5 0 4 5, 5 0, 5 5, 6 0, 6 5, 7 0, 7 5, 8 0    Leukocytes, UA Negative Negative    Nitrite, UA Negative Negative    Protein, UA Negative Negative mg/dl    Glucose, UA Negative Negative mg/dl    Ketones, UA 40 (2+) (A) Negative mg/dl    Urobilinogen, UA <2 0 <2 0 mg/dl mg/dl    Bilirubin, UA Negative Negative    Occult Blood, UA Large (A) Negative    RBC, UA 30-50 (A) None Seen, 2-4 /hpf    WBC, UA 1-2 (A) None Seen, 2-4, 5-60 /hpf    Epithelial Cells Occasional None Seen, Occasional /hpf    Bacteria, UA None Seen None Seen, Occasional /hpf   CBC    Collection Time: 10/26/22  7:21 AM   Result Value Ref Range    WBC 28 06 (H) 4 31 - 10 16 Thousand/uL    RBC 2 67 (L) 3 81 - 5 12 Million/uL    Hemoglobin 8 4 (L) 11 5 - 15 4 g/dL    Hematocrit 26 1 (L) 34 8 - 46 1 %    MCV 98 82 - 98 fL    MCH 31 5 26 8 - 34 3 pg    MCHC 32 2 31 4 - 37 4 g/dL    RDW 14 5 11 6 - 15 1 %    Platelets 787 (L) 912 - 390 Thousands/uL    MPV 11 1 8 9 - 12 7 fL   Fingerstick Glucose (POCT)    Collection Time: 10/26/22 10:22 AM   Result Value Ref Range    POC Glucose 302 (H) 65 - 140 mg/dl   CBC    Collection Time: 10/26/22  1:26 PM   Result Value Ref Range    WBC 28 11 (H) 4 31 - 10 16 Thousand/uL    RBC 2 54 (L) 3 81 - 5 12 Million/uL    Hemoglobin 8 2 (L) 11 5 - 15 4 g/dL    Hematocrit 25 4 (L) 34 8 - 46 1 %     (H) 82 - 98 fL    MCH 32 3 26 8 - 34 3 pg    MCHC 32 3 31 4 - 37 4 g/dL    RDW 14 6 11 6 - 15 1 %    Platelets 046 812 - 687 Thousands/uL    MPV 11 3 8 9 - 12 7 fL   Comprehensive metabolic panel    Collection Time: 10/26/22  1:26 PM   Result Value Ref Range    Sodium 134 (L) 135 - 147 mmol/L    Potassium 4 3 3 5 - 5 3 mmol/L    Chloride 102 96 - 108 mmol/L    CO2 22 21 - 32 mmol/L    ANION GAP 10 4 - 13 mmol/L    BUN 12 5 - 25 mg/dL    Creatinine 1 13 0 60 - 1 30 mg/dL    Glucose 245 (H) 65 - 140 mg/dL    Calcium 7 9 (L) 8 3 - 10 1 mg/dL    Corrected Calcium 9 5 8 3 - 10 1 mg/dL    AST 26 5 - 45 U/L    ALT 18 12 - 78 U/L    Alkaline Phosphatase 148 (H) 46 - 116 U/L    Total Protein 5 3 (L) 6 4 - 8 4 g/dL    Albumin 2 0 (L) 3 5 - 5 0 g/dL    Total Bilirubin 0 10 (L) 0 20 - 1 00 mg/dL    eGFR 64 ml/min/1 73sq m   Magnesium    Collection Time: 10/26/22  1:26 PM   Result Value Ref Range    Magnesium 4 2 (H) 1 6 - 2 6 mg/dL   Uric acid    Collection Time: 10/26/22  1:26 PM   Result Value Ref Range    Uric Acid 8 0 (H) 2 0 - 7 5 mg/dL   Fingerstick Glucose (POCT)    Collection Time: 10/26/22  1:56 PM   Result Value Ref Range    POC Glucose 263 (H) 65 - 140 mg/dl         Brief OB Lab review:  ABO Grouping   Date Value Ref Range Status   10/24/2022 O  Final      Rh Factor   Date Value Ref Range Status   10/24/2022 Negative  Final     Rh Type   Date Value Ref Range Status   08/15/2022 Negative  Final     Comment:     Please note: Prior records for this patient's ABO / Rh type are not  available for additional verification        No results found for: ANTIBODYSCR  No results found for: RUBM    MEDS:   Current Facility-Administered Medications   Medication Dose Route Frequency   • acetaminophen (TYLENOL) tablet 650 mg  650 mg Oral Q6H [address]   • ampicillin-sulbactam (UNASYN) 3 g in sodium chloride 0 9 % 100 mL IVPB  3 g Intravenous Q6H   • azithromycin (ZITHROMAX) 500 mg in sodium chloride 0 9% 250mL IVPB 500 mg  500 mg Intravenous Q24H   • butorphanol (STADOL) injection 1 mg  1 mg Intravenous Q3H PRN   • calcium carbonate (TUMS) chewable tablet 1,000 mg  1,000 mg Oral Daily   • fentaNYL (SUBLIMAZE) injection 25 mcg  25 mcg Intravenous Q5 Min PRN   • HYDROmorphone (DILAUDID) injection 0 5 mg  0 5 mg Intravenous Q5 Min PRN   • HYDROmorphone (DILAUDID) injection 0 5 mg  0 5 mg Intravenous Q2H PRN   • insulin lispro (HumaLOG) 100 units/mL subcutaneous injection 1-6 Units  1-6 Units Subcutaneous 4 times day   • lactated ringers bolus 1,000 mL  1,000 mL Intravenous Once   • lactated ringers infusion  125 mL/hr Intravenous Continuous   • magnesium sulfate 20 g/500 mL infusion (premix)  2 g/hr Intravenous Continuous   • naloxone (NARCAN) injection 0 1 mg  0 1 mg Intravenous Q3 min PRN   • ondansetron (ZOFRAN) injection 4 mg  4 mg Intravenous Q6H PRN   • oxytocin (PITOCIN) 30 Units in lactated ringers 500 mL infusion  1-30 nba-units/min Intravenous Titrated   • Rho(D) immune globulin (RHOGAM ULTRA-FILTERED PLUS) IM injection 300 mcg  300 mcg Intramuscular Once   • sertraline (ZOLOFT) tablet 50 mg  50 mg Oral Daily     Invasive Devices  Timeline    Peripheral Intravenous Line  Duration           Peripheral IV 10/24/22 Dorsal (posterior); Right Forearm 2 days    Peripheral IV 10/25/22 Right Hand <1 day          Drain  Duration           Urethral Catheter Non-latex 16 Fr  1 day                Assessment and Plan:  34y o  year-old , postpartum day 1 status-post  , Low Transverse          Pre-eclampsia, severe, delivered with postpartum condition  BP has been in the normal range  Her creatinine is trending downward  Will continue to follow levels  Magnesium to stop @ 24 hours  Pregnancy complicated by pre-existing type 2 diabetes in third trimester  Lab Results   Component Value Date    HGBA1C 6 4 (H) 08/15/2022       Recent Labs     10/26/22  0640 10/26/22  0712 10/26/22  1022 10/26/22  1356   POCGLU 230* 196* 302* 263*       Blood Sugar Average: Last 72 hrs:  (P) 965 [id number]     Patient is currently on sliding scale  Glucose has been significantly elevated  She does have an elevated WBC and it is unclear if this is related to an infectious process and would be affecting her glucose  Will request endocrine evaluation     delivery delivered  WBC is elevated  Patient is afebrile  Unclear if there is an infectious process or this is a reaction to her acute blood loss last night  Unasyn started  Will monitor for other signs of infection         [facility]

## 2022-10-26 NOTE — ASSESSMENT & PLAN NOTE
WBC is elevated  Patient is afebrile  Unclear if there is an infectious process or this is a reaction to her acute blood loss last night  Unasyn started  Will monitor for other signs of infection

## 2022-10-26 NOTE — PLAN OF CARE
Problem: BIRTH - VAGINAL/ SECTION  Goal: Fetal and maternal status remain reassuring during the birth process  Description: INTERVENTIONS:  - Monitor vital signs  - Monitor fetal heart rate  - Monitor uterine activity  - Monitor labor progression (vaginal delivery)  - DVT prophylaxis  - Antibiotic prophylaxis  Outcome: Completed  Goal: Emotionally satisfying birthing experience for mother/fetus  Description: Interventions:  - Assess, plan, implement and evaluate the nursing care given to the patient in labor  - Advocate the philosophy that each childbirth experience is a unique experience and support the family's chosen level of involvement and control during the labor process   - Actively participate in both the patient's and family's teaching of the birth process  - Consider cultural, Zoroastrian and age-specific factors and plan care for the patient in labor  Outcome: Completed     Problem: Knowledge Deficit  Goal: Verbalizes understanding of labor plan  Description: Assess patient/family/caregiver's baseline knowledge level and ability to understand information  Provide education via patient/family/caregiver's preferred learning method at appropriate level of understanding  1  Provide teaching at level of understanding  2  Provide teaching via preferred learning method(s)  Outcome: Completed  Goal: Patient/family/caregiver demonstrates understanding of disease process, treatment plan, medications, and discharge instructions  Description: Complete learning assessment and assess knowledge base  Interventions:  - Provide teaching at level of understanding  - Provide teaching via preferred learning methods  Outcome: Completed     Problem: Labor & Delivery  Goal: Manages discomfort  Description: Assess and monitor for signs and symptoms of discomfort  Assess patient's pain level regularly and per hospital policy  Administer medications as ordered   Support use of nonpharmacological methods to help control pain such as distraction, imagery, relaxation, and application of heat and cold  Collaborate with interdisciplinary team and patient to determine appropriate pain management plan  1  Include patient in decisions related to comfort  2  Offer non-pharmacological pain management interventions  3  Report ineffective pain management to physician  Outcome: Completed  Goal: Patient vital signs are stable  Description: 1  Assess vital signs - vaginal delivery    Outcome: Completed

## 2022-10-26 NOTE — ASSESSMENT & PLAN NOTE
BP has been in the normal range  Her creatinine is trending downward  Will continue to follow levels  Magnesium to stop @ 24 hours

## 2022-10-26 NOTE — QUICK NOTE
BS still elevated, white count with no decrease in last 6 hours, still 28,000 +  Will begin unasyn for presumed infection, and consult endocrine for BS management of pregestational diabetes type 2    /70 , HR min up 111  Afebrile    abd- soft, min tender, no particular fundal tenderness  Ext- min edema

## 2022-10-26 NOTE — OB LABOR/OXYTOCIN SAFETY PROGRESS
Oxytocin Safety Progress Check Note - Kimberlee Drum 34 y o  female MRN: [id number]    Unit/Bed#: -01 Encounter: [id number]    Dose (nba-units/min) Oxytocin: OFF  Contraction Frequency (minutes): 2-3  Contraction Quality: Moderate  Tachysystole: No   Cervical Dilation: 10  Dilation Complete Date: 10/25/22  Dilation Complete Time: 1655  Cervical Effacement: 100  Fetal Station: 1  Baseline Rate: 135 bpm  Fetal Heart Rate: 130 BPM  FHR Category: Category I             Vital Signs:  Vitals:    10/25/22 2031   BP:    Pulse:    Resp:    Temp: 97 6 °F (36 4 °C)   SpO2:        Notes/comments:   - Delayed documentation due to patient care  I have been in the room frequently for re-assessment of pushing efforts over the past 4 hours  Dr Joe Ceja has also assisted in frequent reassessment of the patient during this time course  At this time, fetal vertex remains at +1 station  The patient is not a candidate for operative vaginal delivery  SAFETY HUDDLE:  TRIGGER: Patient meets criteria for Arrest of Descent    PARTICIPANTS: Omari Caballero RN; Ada Lester MD; Jessica Monique MD    REVIEW OF CURRENT PLAN OF CARE AND MANAGEMENT:   Imelda Arreguin is a multipara who has been pushing without an epidural for 4 hours  Progress during the second stage of labor has been protracted  Maternal status is reassuring  Fetal status shows a Category II FHR tracing  I recommend  section for Arrest of Descent  I have discussed the risks and benefits of  delivery with Imelda Arreguin, including bleeding, infection, damage to surrounding organs/structures (specifically bowel, bladder, vessels, nerves, ureters), hernia, scar tissue formation, and need for further surgery  I will consider use of the Fetal Pillow to assist with delivery  The patient was given the opportunity to ask questions  All questions answered  Patient agrees to proceed with surgery as recommended       ALL PARTICIPANTS IN AGREEMENT WITH PLAN OF CARE: Yes     IS PERRT REQUIRED: No       Austin Garcia 10/25/2022 9:01 PM

## 2022-10-26 NOTE — ANESTHESIA POSTPROCEDURE EVALUATION
Post-Op Assessment Note    CV Status:  Stable  Pain Score: 0    Pain management: adequate     Mental Status:  Alert and awake   Hydration Status:  Euvolemic and stable   PONV Controlled:  None   Airway Patency:  Patent      Post Op Vitals Reviewed: Yes      Staff: CRNA     Post-op block assessment: no complications      No complications documented      /83 (10/25/22 2259)    Temp 97 6 °F (36 4 °C) (10/25/22 2259)    Pulse 97 (10/25/22 2259)   Resp 19 (10/25/22 2259)    SpO2 95 % (10/25/22 2259)

## 2022-10-27 LAB
ALBUMIN SERPL BCP-MCNC: 1.9 G/DL (ref 3.5–5)
ALP SERPL-CCNC: 147 U/L (ref 46–116)
ALT SERPL W P-5'-P-CCNC: 17 U/L (ref 12–78)
ANION GAP SERPL CALCULATED.3IONS-SCNC: 7 MMOL/L (ref 4–13)
AST SERPL W P-5'-P-CCNC: 23 U/L (ref 5–45)
BILIRUB SERPL-MCNC: <0.1 MG/DL (ref 0.2–1)
BUN SERPL-MCNC: 12 MG/DL (ref 5–25)
CALCIUM ALBUM COR SERPL-MCNC: 9.5 MG/DL (ref 8.3–10.1)
CALCIUM SERPL-MCNC: 7.8 MG/DL (ref 8.3–10.1)
CHLORIDE SERPL-SCNC: 106 MMOL/L (ref 96–108)
CO2 SERPL-SCNC: 26 MMOL/L (ref 21–32)
CREAT SERPL-MCNC: 1.13 MG/DL (ref 0.6–1.3)
ERYTHROCYTE [DISTWIDTH] IN BLOOD BY AUTOMATED COUNT: 15.1 % (ref 11.6–15.1)
ERYTHROCYTE [DISTWIDTH] IN BLOOD BY AUTOMATED COUNT: 15.2 % (ref 11.6–15.1)
ERYTHROCYTE [DISTWIDTH] IN BLOOD BY AUTOMATED COUNT: 15.3 % (ref 11.6–15.1)
GFR SERPL CREATININE-BSD FRML MDRD: 64 ML/MIN/1.73SQ M
GLUCOSE SERPL-MCNC: 116 MG/DL (ref 65–140)
GLUCOSE SERPL-MCNC: 132 MG/DL (ref 65–140)
GLUCOSE SERPL-MCNC: 136 MG/DL (ref 65–140)
GLUCOSE SERPL-MCNC: 165 MG/DL (ref 65–140)
GLUCOSE SERPL-MCNC: 214 MG/DL (ref 65–140)
GLUCOSE SERPL-MCNC: 67 MG/DL (ref 65–140)
GLUCOSE SERPL-MCNC: 76 MG/DL (ref 65–140)
GLUCOSE SERPL-MCNC: 78 MG/DL (ref 65–140)
HCT VFR BLD AUTO: 22.6 % (ref 34.8–46.1)
HCT VFR BLD AUTO: 23.6 % (ref 34.8–46.1)
HCT VFR BLD AUTO: 23.8 % (ref 34.8–46.1)
HGB BLD-MCNC: 7.4 G/DL (ref 11.5–15.4)
HGB BLD-MCNC: 7.7 G/DL (ref 11.5–15.4)
HGB BLD-MCNC: 7.7 G/DL (ref 11.5–15.4)
MCH RBC QN AUTO: 32.1 PG (ref 26.8–34.3)
MCH RBC QN AUTO: 32.5 PG (ref 26.8–34.3)
MCH RBC QN AUTO: 32.7 PG (ref 26.8–34.3)
MCHC RBC AUTO-ENTMCNC: 32.4 G/DL (ref 31.4–37.4)
MCHC RBC AUTO-ENTMCNC: 32.6 G/DL (ref 31.4–37.4)
MCHC RBC AUTO-ENTMCNC: 32.7 G/DL (ref 31.4–37.4)
MCV RBC AUTO: 100 FL (ref 82–98)
MCV RBC AUTO: 100 FL (ref 82–98)
MCV RBC AUTO: 98 FL (ref 82–98)
PLATELET # BLD AUTO: 172 THOUSANDS/UL (ref 149–390)
PLATELET # BLD AUTO: 173 THOUSANDS/UL (ref 149–390)
PLATELET # BLD AUTO: 180 THOUSANDS/UL (ref 149–390)
PMV BLD AUTO: 10.8 FL (ref 8.9–12.7)
PMV BLD AUTO: 11.2 FL (ref 8.9–12.7)
PMV BLD AUTO: 11.6 FL (ref 8.9–12.7)
POTASSIUM SERPL-SCNC: 4 MMOL/L (ref 3.5–5.3)
PROT SERPL-MCNC: 5.4 G/DL (ref 6.4–8.4)
RBC # BLD AUTO: 2.26 MILLION/UL (ref 3.81–5.12)
RBC # BLD AUTO: 2.37 MILLION/UL (ref 3.81–5.12)
RBC # BLD AUTO: 2.4 MILLION/UL (ref 3.81–5.12)
SODIUM SERPL-SCNC: 139 MMOL/L (ref 135–147)
URATE SERPL-MCNC: 7.8 MG/DL (ref 2–7.5)
WBC # BLD AUTO: 19.25 THOUSAND/UL (ref 4.31–10.16)
WBC # BLD AUTO: 21.03 THOUSAND/UL (ref 4.31–10.16)
WBC # BLD AUTO: 23.48 THOUSAND/UL (ref 4.31–10.16)

## 2022-10-27 RX ORDER — CALCIUM CARBONATE 200(500)MG
1000 TABLET,CHEWABLE ORAL 3 TIMES DAILY PRN
Status: DISCONTINUED | OUTPATIENT
Start: 2022-10-27 | End: 2022-10-28 | Stop reason: HOSPADM

## 2022-10-27 RX ORDER — NIFEDIPINE 30 MG/1
30 TABLET, EXTENDED RELEASE ORAL DAILY
Status: DISCONTINUED | OUTPATIENT
Start: 2022-10-27 | End: 2022-10-28 | Stop reason: HOSPADM

## 2022-10-27 RX ORDER — KETOROLAC TROMETHAMINE 30 MG/ML
30 INJECTION, SOLUTION INTRAMUSCULAR; INTRAVENOUS EVERY 6 HOURS SCHEDULED
Status: DISCONTINUED | OUTPATIENT
Start: 2022-10-28 | End: 2022-10-28

## 2022-10-27 RX ORDER — ONDANSETRON 2 MG/ML
4 INJECTION INTRAMUSCULAR; INTRAVENOUS EVERY 8 HOURS PRN
Status: DISCONTINUED | OUTPATIENT
Start: 2022-10-27 | End: 2022-10-28 | Stop reason: HOSPADM

## 2022-10-27 RX ORDER — INSULIN LISPRO 100 [IU]/ML
11 INJECTION, SOLUTION INTRAVENOUS; SUBCUTANEOUS ONCE
Status: COMPLETED | OUTPATIENT
Start: 2022-10-27 | End: 2022-10-27

## 2022-10-27 RX ORDER — SIMETHICONE 80 MG
80 TABLET,CHEWABLE ORAL 4 TIMES DAILY PRN
Status: DISCONTINUED | OUTPATIENT
Start: 2022-10-27 | End: 2022-10-28 | Stop reason: HOSPADM

## 2022-10-27 RX ORDER — INSULIN GLARGINE 100 [IU]/ML
30 INJECTION, SOLUTION SUBCUTANEOUS EVERY MORNING
Status: DISCONTINUED | OUTPATIENT
Start: 2022-10-27 | End: 2022-10-27

## 2022-10-27 RX ORDER — DIPHENHYDRAMINE HYDROCHLORIDE 50 MG/ML
25 INJECTION INTRAMUSCULAR; INTRAVENOUS EVERY 6 HOURS PRN
Status: DISCONTINUED | OUTPATIENT
Start: 2022-10-27 | End: 2022-10-28 | Stop reason: HOSPADM

## 2022-10-27 RX ORDER — OXYCODONE HYDROCHLORIDE 5 MG/1
5 TABLET ORAL EVERY 4 HOURS PRN
Status: DISCONTINUED | OUTPATIENT
Start: 2022-10-27 | End: 2022-10-28 | Stop reason: HOSPADM

## 2022-10-27 RX ORDER — OXYTOCIN/RINGER'S LACTATE 30/500 ML
62.5 PLASTIC BAG, INJECTION (ML) INTRAVENOUS ONCE
Status: DISCONTINUED | OUTPATIENT
Start: 2022-10-27 | End: 2022-10-28 | Stop reason: HOSPADM

## 2022-10-27 RX ORDER — INSULIN GLARGINE 100 [IU]/ML
26 INJECTION, SOLUTION SUBCUTANEOUS EVERY MORNING
Status: DISCONTINUED | OUTPATIENT
Start: 2022-10-27 | End: 2022-10-28

## 2022-10-27 RX ORDER — IBUPROFEN 600 MG/1
600 TABLET ORAL EVERY 6 HOURS
Status: DISCONTINUED | OUTPATIENT
Start: 2022-10-28 | End: 2022-10-28

## 2022-10-27 RX ORDER — INSULIN LISPRO 100 [IU]/ML
7 INJECTION, SOLUTION INTRAVENOUS; SUBCUTANEOUS
Status: DISCONTINUED | OUTPATIENT
Start: 2022-10-28 | End: 2022-10-28 | Stop reason: HOSPADM

## 2022-10-27 RX ORDER — FERROUS SULFATE 325(65) MG
325 TABLET ORAL 2 TIMES DAILY WITH MEALS
Status: DISCONTINUED | OUTPATIENT
Start: 2022-10-27 | End: 2022-10-28 | Stop reason: HOSPADM

## 2022-10-27 RX ORDER — INSULIN LISPRO 100 [IU]/ML
10 INJECTION, SOLUTION INTRAVENOUS; SUBCUTANEOUS
Status: DISCONTINUED | OUTPATIENT
Start: 2022-10-27 | End: 2022-10-27

## 2022-10-27 RX ORDER — ACETAMINOPHEN 325 MG/1
650 TABLET ORAL EVERY 4 HOURS PRN
Status: DISCONTINUED | OUTPATIENT
Start: 2022-10-27 | End: 2022-10-28 | Stop reason: HOSPADM

## 2022-10-27 RX ORDER — OXYCODONE HYDROCHLORIDE 5 MG/1
10 TABLET ORAL EVERY 4 HOURS PRN
Status: DISCONTINUED | OUTPATIENT
Start: 2022-10-27 | End: 2022-10-28 | Stop reason: HOSPADM

## 2022-10-27 RX ORDER — DOCUSATE SODIUM 100 MG/1
100 CAPSULE, LIQUID FILLED ORAL 2 TIMES DAILY PRN
Status: DISCONTINUED | OUTPATIENT
Start: 2022-10-27 | End: 2022-10-28 | Stop reason: HOSPADM

## 2022-10-27 RX ADMIN — ACETAMINOPHEN 650 MG: 325 TABLET, FILM COATED ORAL at 12:57

## 2022-10-27 RX ADMIN — ACETAMINOPHEN 650 MG: 325 TABLET, FILM COATED ORAL at 00:02

## 2022-10-27 RX ADMIN — INSULIN LISPRO 10 UNITS: 100 INJECTION, SOLUTION INTRAVENOUS; SUBCUTANEOUS at 13:12

## 2022-10-27 RX ADMIN — INSULIN LISPRO 11 UNITS: 100 INJECTION, SOLUTION INTRAVENOUS; SUBCUTANEOUS at 09:21

## 2022-10-27 RX ADMIN — INSULIN LISPRO 10 UNITS: 100 INJECTION, SOLUTION INTRAVENOUS; SUBCUTANEOUS at 17:21

## 2022-10-27 RX ADMIN — SODIUM CHLORIDE, SODIUM LACTATE, POTASSIUM CHLORIDE, AND CALCIUM CHLORIDE 125 ML/HR: .6; .31; .03; .02 INJECTION, SOLUTION INTRAVENOUS at 09:22

## 2022-10-27 RX ADMIN — ACETAMINOPHEN 650 MG: 325 TABLET, FILM COATED ORAL at 17:41

## 2022-10-27 RX ADMIN — AMPICILLIN SODIUM AND SULBACTAM SODIUM 3 G: 2; 1 INJECTION, POWDER, FOR SOLUTION INTRAMUSCULAR; INTRAVENOUS at 09:22

## 2022-10-27 RX ADMIN — ACETAMINOPHEN 650 MG: 325 TABLET, FILM COATED ORAL at 05:57

## 2022-10-27 RX ADMIN — FERROUS SULFATE TAB 325 MG (65 MG ELEMENTAL FE) 325 MG: 325 (65 FE) TAB at 17:08

## 2022-10-27 RX ADMIN — AMPICILLIN SODIUM AND SULBACTAM SODIUM 3 G: 2; 1 INJECTION, POWDER, FOR SOLUTION INTRAMUSCULAR; INTRAVENOUS at 15:31

## 2022-10-27 RX ADMIN — ACETAMINOPHEN 650 MG: 325 TABLET, FILM COATED ORAL at 22:42

## 2022-10-27 RX ADMIN — AMPICILLIN SODIUM AND SULBACTAM SODIUM 3 G: 2; 1 INJECTION, POWDER, FOR SOLUTION INTRAMUSCULAR; INTRAVENOUS at 03:27

## 2022-10-27 RX ADMIN — INSULIN GLARGINE 26 UNITS: 100 INJECTION, SOLUTION SUBCUTANEOUS at 09:16

## 2022-10-27 RX ADMIN — INSULIN LISPRO 11 UNITS: 100 INJECTION, SOLUTION INTRAVENOUS; SUBCUTANEOUS at 00:30

## 2022-10-27 RX ADMIN — NIFEDIPINE 30 MG: 30 TABLET, FILM COATED, EXTENDED RELEASE ORAL at 17:39

## 2022-10-27 RX ADMIN — SERTRALINE HYDROCHLORIDE 50 MG: 50 TABLET ORAL at 08:45

## 2022-10-27 NOTE — PROGRESS NOTES
Post- Progress note    Patient is post-op day 2 from a  delivery  Pain: controlled  Tolerating Oral Intake: yes  Voiding: yes  Flatus:yes  Ambulating: yes  Breastfeeding: well  Chest Pain: no  Shortness of Breath: no  Leg Pain/Discomfort: no  Lochia: minimal      Objective:   Vitals:    10/27/22 0850   BP: 144/86   Pulse: 103   Resp: 18   Temp: 98 8 °F (37 1 °C)   SpO2: 95%       Intake/Output Summary (Last 24 hours) at 10/27/2022 1028  Last data filed at 10/27/2022 6980  Gross per 24 hour   Intake 2822 92 ml   Output 2775 ml   Net 47 92 ml       Physical Exam:  General: alert awake oriented  Cardiovascular: RRR  Lungs: clear  Abdomen:soft, nontender, minimal distention  Fundus: below umbilicus  Incision: clean dry intact  Lower extremeties: no evidence of DBT    Assessment and Plan:  34y o  year-old , postoperative day2 status-post  delivery for arrest of descent    Pre-eclampsia, severe, delivered with postpartum condition  BP has been in the normal range after magnesium sulfate after 24 hours postpartum  Her creatinine is trending downward  Will continue to follow level      Pregnancy complicated by pre-existing type 2 diabetes in third trimester  Glucose has been significantly elevated and was managed with sliding scale  Endocrine input and management appreciated       delivery delivered  WBC were elevated, trending down since delivery  Patient has remained afebrile  Unclear if there is an infectious process or this is a reaction to her acute blood loss last night  Unasyn started yesterday   Will monitor for other signs of infection and follow WBC     Post-op anemia  Venofer administered yesterday  Patient asymptomatic  Start P O  iron supplements  Will follow Hgb in AM with WBC    Continue routine post op care, encourage ambulation    Jolena Post

## 2022-10-27 NOTE — QUICK NOTE
CALLED BY NURSING TO SEE PATIENT  OB HOSPITALIST ON CALL    Called by RN and MD Dr Evaristo Méndez at home to evaluate patient's blood pressure of 164/75  Patient reports mild left orbital headache  She denies visual disturbance/increased abdominal or RUQ pain  She does feel her swelling has worsened - particularly in her hands and feet  She is voiding frequently and copiously  Tolerating house diet  Flatus present  Pain well controlled on oral medication      /73   Pulse 102   Temp 98 °F (36 7 °C) (Oral)   Resp 18   Ht 5' 3" (1 6 m)   Wt 105 kg (230 lb 12 8 oz)   LMP 01/15/2022   SpO2 97%   Breastfeeding Yes   BMI 40 88 kg/m²     Cor - RRR  Lungs - clear to auscultation bilaterally  Abdomen - soft, nontender, nondistended, + bowel sounds  Extremities - +2 pitting edema up to knees    Labs:   Latest Reference Range & Units 10/27/22 14:04   WBC 4 31 - 10 16 Thousand/uL 21 03 (H)   Red Blood Cell Count 3 81 - 5 12 Million/uL 2 37 (L)   Hemoglobin 11 5 - 15 4 g/dL 7 7 (L)   HCT 34 8 - 46 1 % 23 8 (L)   MCV 82 - 98 fL 100 (H)   MCH 26 8 - 34 3 pg 32 5   MCHC 31 4 - 37 4 g/dL 32 4   RDW 11 6 - 15 1 % 15 2 (H)   Platelet Count 262 - 390 Thousands/uL 173   MPV 8 9 - 12 7 fL 10 8   (H): Data is abnormally high  (L): Data is abnormally low     Latest Reference Range & Units 10/27/22 04:06   Sodium 135 - 147 mmol/L 139   Potassium 3 5 - 5 3 mmol/L 4 0   Chloride 96 - 108 mmol/L 106   CO2 21 - 32 mmol/L 26   Anion Gap 4 - 13 mmol/L 7   BUN 5 - 25 mg/dL 12   Creatinine 0 60 - 1 30 mg/dL 1 13   Glucose, Random 65 - 140 mg/dL 136   Calcium 8 3 - 10 1 mg/dL 7 8 (L)   CORRECTED CALCIUM 8 3 - 10 1 mg/dL 9 5   AST 5 - 45 U/L 23   ALT 12 - 78 U/L 17   Alkaline Phosphatase 46 - 116 U/L 147 (H)   Total Protein 6 4 - 8 4 g/dL 5 4 (L)   Albumin 3 5 - 5 0 g/dL 1 9 (L)   TOTAL BILIRUBIN 0 20 - 1 00 mg/dL <0 10 (L)   eGFR ml/min/1 73sq m 64   (L): Data is abnormally low  (H): Data is abnormally high      Assessment:  Elevated blood pressure in setting of initially gestational hypertension (no meds) which progressed to pre-eclampsia with severe features;  BABATUNDE,  Early III and type II DM    PLAN:    1  Hypertension - s/p Magnesium sulfate x 24 hr postpartum;  Labs earlier today confirm resolving pre-eclampsia; Will start oral procardia XL 30 mg to control hypertension;  Recheck CBC and CMP tomorrow AM    2  BABATUNDE - Cr peak at 1 33 immediately after delivery, this AM 1 13 and resolving; Check Cr in AM and no further IVF    3  Chorioamnionitis/III - never had fever but long labor with ruptured membranes and WBC elevated to peak 28 11;  S/p Unasyn x 24 hours,  Remains afebrile    4  Type II DM - blood sugar control per endocrine    D/W Dr Sharron Lopez via telephone      [facility] MD  OB/GYN Hospitalist  [phone]  10/27/2022   5:39 PM

## 2022-10-27 NOTE — CONSULTS
Consultation - Jennifer Barrientos 34 y o  female MRN: [id number]    Unit/Bed#: -01 Encounter: [id number]      Assessment/Plan     Assessment: This is a 34y o -year-old female with diabetes with hyperglycemia  1  Gestational diabetes with history of prediabetes  Blood sugars still elevated postpartum possibly due to the elevated WBC and possible infection  Will restart basal/bolus insulin dosing  hopefully will be able to taper off quickly when home and further postpartum  Want to get sugars down to promote healing  2  Postpartum  section  Treatment per OG/GYN  Plan:  1  Initiate basal/bolus insulin regimen again:  Will start Lantus insulin 26 units daily in a m  For now  Will be able to use her Basaglar insulin at home  2  Will start Humalog insulin 10 units with each meal with her current sliding scale  3  Continue q i d  blood sugar testing  4  Follow-up with endocrinology within 1 week post discharge  CC: Diabetes Consult    History of Present Illness     HPI: Jennifer Barrientos is a 34y o  year old female with type Gestational diabetes with history of prediabetes pre pregnancy  Hemoglobin A1c of 6 3% in 2021 confirms prediabetes  She had been on insulin therapy during pregnancy with metformin and is post  section at 38 weeks for preeclampsia of a male baby on 10/25/2022  She is still having elevated WBC count and is now on antibiotics for possible infection  She of note is planning on nursing and bottle feeding combination  She is on oral agents and insulin at home  She was utilizing Basaglar insulin 96 units at bedtime, NovoLog insulin 20 units at breakfast, 22 units at lunch, and 20 units at supper along with metformin 500 mg in the morning 1000 mg in the evening  She denies any polyuria, polyphagia, and blurry vision  She has some thirst and nocturia twice a night    She has some numbness and tingling of the feet and hands with the edema she has currently  She denies neuropathy, nephropathy, retinopathy, heart attack, stroke and claudication but does admit to none  She denies any hypoglycemia  Of note, weight gain in pregnancy was 40 lb  She does not see endocrinology prior to pregnancy but was seeing perinatology during pregnancy  Inpatient consult to Endocrinology  Consult performed by: Antonia Rizvi MD  Consult ordered by: Kaylin Trejo DO          Review of Systems   Constitutional: Negative for fatigue  Eyes: Negative for visual disturbance  Respiratory: Negative for chest tightness and shortness of breath  Cardiovascular: Positive for leg swelling  Negative for chest pain  Lower extremity and hand edema  Gastrointestinal: Negative for constipation, diarrhea and nausea  Endocrine: Positive for polydipsia  Negative for polyphagia and polyuria  Nocturia twice a night   Skin: Negative for wound  Neurological: Positive for numbness  Negative for headaches  Numbness and tingling of the feet and hands with edema  Psychiatric/Behavioral: Negative for sleep disturbance  Historical Information   Past Medical History:   Diagnosis Date   • Chronic fatigue    • Depression     currently on Zoloft 50 mg managed by PCP  Past Surgical History:   Procedure Laterality Date   • MS  DELIVERY ONLY N/A 10/25/2022    Procedure:  SECTION ();   Surgeon: Alessia Montoya MD;  Location: [facility];  Service: Obstetrics   • TONSILECTOMY AND ADNOIDECTOMY     • WISDOM TOOTH EXTRACTION       Social History   Social History     Substance and Sexual Activity   Alcohol Use Not Currently   • Alcohol/week: 2 0 standard drinks   • Types: 2 Standard drinks or equivalent per week     Social History     Substance and Sexual Activity   Drug Use Never     Social History     Tobacco Use   Smoking Status Former Smoker   • Packs/day: 0 50   • Years: 10 00   • Pack years: 5 00   • Types: Cigarettes   • Quit date: 5/6/2019   • Years since quitting: 3 4   Smokeless Tobacco Never Used     Family History:   Family History   Problem Relation Age of Onset   • Thyroid disease Mother    • Diabetes type II Father    • Diabetes type II Maternal Grandmother    • Cancer Maternal Grandfather    • Lung cancer Maternal Grandfather    • Diabetes type II Maternal Grandfather    • Breast cancer Neg Hx    • Ovarian cancer Neg Hx    • Colon cancer Neg Hx    • Down syndrome Neg Hx    • Fragile X syndrome Neg Hx    • Cystic fibrosis Neg Hx        Meds/Allergies   Current Facility-Administered Medications   Medication Dose Route Frequency Provider Last Rate Last Admin   • acetaminophen (TYLENOL) tablet 650 mg  650 mg Oral Q6H PRN Kami Bhat, DO   650 mg at 10/27/22 0557   • ampicillin-sulbactam (UNASYN) 3 g in sodium chloride 0 9 % 100 mL IVPB  3 g Intravenous Q6H Kami Bhat,  mL/hr at 10/27/22 0327 3 g at 10/27/22 0327   • azithromycin (ZITHROMAX) 500 mg in sodium chloride 0 9% 250mL IVPB 500 mg  500 mg Intravenous Q24H Bin Delaney MD   500 mg at 10/26/22 2216   • butorphanol (STADOL) injection 1 mg  1 mg Intravenous Q3H PRN Connie Wright MD   1 mg at 10/25/22 0331   • calcium carbonate (TUMS) chewable tablet 1,000 mg  1,000 mg Oral Daily Christy Peters MD   1,000 mg at 10/26/22 0847   • fentaNYL (SUBLIMAZE) injection 25 mcg  25 mcg Intravenous Q5 Min PRN Juliocesar Jain MD       • HYDROmorphone (DILAUDID) injection 0 5 mg  0 5 mg Intravenous Q5 Min PRN Juliocesar Jain MD       • HYDROmorphone (DILAUDID) injection 0 5 mg  0 5 mg Intravenous Q2H PRN Juliocesar Jain MD       • insulin glargine (LANTUS) subcutaneous injection 26 Units 0 26 mL  26 Units Subcutaneous GHULAM Coyne MD       • insulin lispro (HumaLOG) 100 units/mL subcutaneous injection 1-6 Units  1-6 Units Subcutaneous 4 times day Bin Delaney MD   2 Units at 10/26/22 7778   • insulin lispro (HumaLOG) 100 units/mL subcutaneous injection 10 Units  10 Units Subcutaneous TID With Meals Andrea Peter MD       • insulin lispro (HumaLOG) 100 units/mL subcutaneous injection 11 Units  11 Units Subcutaneous Once [employer], DO       • lactated ringers bolus 1,000 mL  1,000 mL Intravenous Once Agnieszka Terry MD   Held at 10/24/22 1541   • lactated ringers infusion  125 mL/hr Intravenous Continuous Agnieszka Terry  mL/hr at 10/26/22 2354 125 mL/hr at 10/26/22 2354   • ondansetron (ZOFRAN) injection 4 mg  4 mg Intravenous Q6H PRN Agnieszka Terry MD   4 mg at 10/25/22 1049   • oxytocin (PITOCIN) 30 Units in lactated ringers 500 mL infusion  1-30 nba-units/min Intravenous Titrated Juancarlos Fletcher  mL/hr at 10/25/22 2214 250 nba-units/min at 10/25/22 2214   • Rho(D) immune globulin (RHOGAM ULTRA-FILTERED PLUS) IM injection 300 mcg  300 mcg Intramuscular Once Hi Miller MD       • sertraline (ZOLOFT) tablet 50 mg  50 mg Oral Daily Agnieszka Terry MD   50 mg at 10/26/22 0847     No Known Allergies    Objective   Vitals: Blood pressure 128/71, pulse (!) 106, temperature 97 7 °F (36 5 °C), temperature source Temporal, resp  rate 16, height 5' 3" (1 6 m), weight 105 kg (230 lb 12 8 oz), last menstrual period 01/15/2022, SpO2 96 %, not currently breastfeeding  Intake/Output Summary (Last 24 hours) at 10/27/2022 0840  Last data filed at 10/27/2022 0330  Gross per 24 hour   Intake 1000 ml   Output 3125 ml   Net -2125 ml     Invasive Devices  Report    Peripheral Intravenous Line  Duration           Peripheral IV 10/24/22 Dorsal (posterior); Right Forearm 2 days    Peripheral IV 10/26/22 Left;Ventral (anterior) Forearm <1 day                Physical Exam  Vitals reviewed  Constitutional:       Appearance: Normal appearance  She is well-developed  HENT:      Head: Normocephalic and atraumatic  Eyes:      Extraocular Movements: Extraocular movements intact        Conjunctiva/sclera: Conjunctivae normal    Neck:      Thyroid: No thyromegaly  Vascular: No carotid bruit  Comments: Thyroid normal in size  Cardiovascular:      Rate and Rhythm: Normal rate and regular rhythm  Pulses: Normal pulses  Heart sounds: Normal heart sounds  No murmur heard  Pulmonary:      Effort: Pulmonary effort is normal       Breath sounds: Normal breath sounds  No wheezing  Abdominal:      General: Bowel sounds are normal       Palpations: Abdomen is soft  Tenderness: There is no abdominal tenderness  Musculoskeletal:         General: No deformity  Normal range of motion  Cervical back: Normal range of motion and neck supple  Right lower leg: Edema present  Left lower leg: Edema present  Comments: 1+ bilateral lower extremity edema  Lymphadenopathy:      Cervical: No cervical adenopathy  Skin:     General: Skin is warm and dry  Findings: No rash  Neurological:      Mental Status: She is alert and oriented to person, place, and time  Deep Tendon Reflexes: Reflexes are normal and symmetric  Comments: Light touch sensation grossly intact to the plantar surface of the feet  The history was obtained from the review of the chart, patient  Lab Results:     Hemoglobin A1c in August 2022 was 6 4%  TSH on 05/24/2020 was 0 623        Lab Results   Component Value Date    WBC 23 48 (H) 10/27/2022    HGB 7 7 (L) 10/27/2022    HCT 23 6 (L) 10/27/2022    MCV 98 10/27/2022     10/27/2022     Lab Results   Component Value Date/Time    BUN 12 10/27/2022 04:06 AM    BUN 7 05/24/2022 07:49 AM    K 4 0 10/27/2022 04:06 AM    K 4 4 05/24/2022 07:49 AM     10/27/2022 04:06 AM     05/24/2022 07:49 AM    CO2 26 10/27/2022 04:06 AM    CO2 19 (L) 05/24/2022 07:49 AM    CREATININE 1 13 10/27/2022 04:06 AM    AST 23 10/27/2022 04:06 AM    AST 11 05/24/2022 07:49 AM    ALT 17 10/27/2022 04:06 AM    ALT 9 05/24/2022 07:49 AM    ALB 1 9 (L) 10/27/2022 04:06 AM    GLOB 2 5 05/24/2022 07:49 AM    GLOB 2 9 07/19/2021 08:33 AM     No results for input(s): CHOL, HDL, LDL, TRIG, VLDL in the last 72 hours  No results found for: Kylie Fischer  POC Glucose (mg/dl)   Date Value   10/27/2022 165 (H)   10/27/2022 214 (H)   10/26/2022 207 (H)   10/26/2022 220 (H)   10/26/2022 263 (H)   10/26/2022 302 (H)   10/26/2022 196 (H)   10/26/2022 230 (H)   10/26/2022 217 (H)   10/25/2022 113       Imaging Studies: I have personally reviewed pertinent reports  Portions of the record may have been created with voice recognition software

## 2022-10-27 NOTE — PLAN OF CARE
Problem: POSTPARTUM  Goal: Experiences normal postpartum course  Description: INTERVENTIONS:  - Monitor maternal vital signs  - Assess uterine involution and lochia  Outcome: Progressing  Goal: Appropriate maternal -  bonding  Description: INTERVENTIONS:  - Identify family support  - Assess for appropriate maternal/infant bonding   -Encourage maternal/infant bonding opportunities  - Referral to  or  as needed  Outcome: Progressing  Goal: Establishment of infant feeding pattern  Description: INTERVENTIONS:  - Assess breast/bottle feeding  - Refer to lactation as needed  Outcome: Progressing  Goal: Incision(s), wounds(s) or drain site(s) healing without S/S of infection  Description: INTERVENTIONS  - Assess and document dressing, incision, wound bed, drain sites and surrounding tissue  - Provide patient and family education  - Perform skin care/dressing changes as needed  Problem: PAIN - ADULT  Goal: Verbalizes/displays adequate comfort level or baseline comfort level  Description: Interventions:  - Encourage patient to monitor pain and request assistance  - Assess pain using appropriate pain scale  - Administer analgesics based on type and severity of pain and evaluate response  - Implement non-pharmacological measures as appropriate and evaluate response  - Consider cultural and social influences on pain and pain management  - Notify physician/advanced practitioner if interventions unsuccessful or patient reports new pain  Outcome: Progressing    Problem: DISCHARGE PLANNING  Goal: Discharge to home or other facility with appropriate resources  Description: INTERVENTIONS:  - Identify barriers to discharge w/patient and caregiver  - Arrange for needed discharge resources and transportation as appropriate  - Identify discharge learning needs (meds, wound care, etc )  - Arrange for interpretive services to assist at discharge as needed  - Refer to Case Management Department for coordinating discharge planning if the patient needs post-hospital services based on physician/advanced practitioner order or complex needs related to functional status, cognitive ability, or social support system  Outcome: Progressing     Problem: Potential for Falls  Goal: Patient will remain free of falls  Description: INTERVENTIONS:  - Educate patient/family on patient safety including physical limitations  - Instruct patient to call for assistance with activity   - Consult OT/PT to assist with strengthening/mobility   - Keep Call bell within reach  - Keep bed low and locked with side rails adjusted as appropriate  - Keep care items and personal belongings within reach  - Initiate and maintain comfort rounds  - Make Fall Risk Sign visible to staff  - Offer Toileting  in advance of need  - Apply yellow socks and bracelet for high fall risk patients  - Consider moving patient to room near nurses station  Outcome: Progressing

## 2022-10-27 NOTE — LACTATION NOTE
This note was copied from a baby's chart  CONSULT - LACTATION  Baby Boy Landry Rutherford) Vee 2 days male MRN: [id number]    [facility] NURSERY Room / Bed:  206(N)/ 206(N) Encounter: [id number]    Maternal Information     MOTHER:  Naya Baxter  Maternal Age: 34 y o    OB History: # 1 - Date: 10/25/22, Sex: Male, Weight: 3900 g (8 lb 9 6 oz), GA: 38w3d, Delivery: , Low Transverse, Apgar1: 4, Apgar5: None, Living: Living, Birth Comments: Dr Too Newton at delivery, see MD note for resucitation details   Previouse breast reduction surgery? No    Lactation history:   Has patient previously breast fed: No   How long had patient previously breast fed:     Previous breast feeding complications:       Past Surgical History:   Procedure Laterality Date   • [place]  DELIVERY ONLY N/A 10/25/2022    Procedure:  SECTION (); Surgeon: Jasmin Kwan MD;  Location: [facility];  Service: Obstetrics   • TONSILECTOMY AND ADNOIDECTOMY     • WISDOM TOOTH EXTRACTION          Birth information:  YOB: 2023   Time of birth: 9:42 PM   Sex: male   Delivery type: , Low Transverse   Birth Weight: 3900 g (8 lb 9 6 oz)   Percent of Weight Change: -4%     Gestational Age: 36w4d   [unfilled]    Assessment     Breast and nipple assessment: normal assessment    Bovina Assessment: baby was sleepy initially, then with suck training and muscle exercises completed with expressed colostrum, baby begin to cue  Baby would latch deeply and breast compressions were used, but  baby would become fussy  Syringe and NG tube was used for SNS at breast, to which baby did not feed with  Feeding assessment: latch difficulty (baby would latch and become fussy, more flow was attempted at the breast with syringe and NG tube but baby would not take   Baby was syringe fed 13 ml with finger )  LATCH:  Latch: Repeated attempts, hold nipple in mouth, stimulate to suck Audible Swallowing: A few with stimulation   Type of Nipple: Everted (After stimulation)   Comfort (Breast/Nipple): Soft/non-tender   Hold (Positioning): Full assist, staff holds infant at breast   LATCH Score: 6          Feeding recommendations:  attempt to latch at breast when baby is cueing and calm to practice, provide baby with expressed colostrum/breastmilk via bottle pacing the feeding and pump to protect milk supply     Met with parents to follow up and assist with feeding  Baby was skin to skin upon entering room  Suck training and muscle exercises were completed with expressed colostrum, where baby began to suckle and bring tongue out  Baby was placed in cross cradle and football hold, guiding mother in supporting him at the nape and compressing the breast  Baby latched on deeply during attempts, would suckle a few times and become fussy and pull off  Adding more flow with SNS was attempted, but baby would not latch with it in place  Mother was set up for pumping and baby was fed via syringe/finger 13 ml of formula  Mother was educated on importance to pace feedings when using a bottle  Mother was encouraged to continue to latch to practice holding him, supporting him and getting deep latches and if baby does not fed at breast to pump to protect her milk supply while baby is fed with colostrum/breastmilk expressed and supplementation for feedings          Shavonne Shankar 10/27/2022 12:36 PM

## 2022-10-28 ENCOUNTER — TELEPHONE (OUTPATIENT)
Dept: OTHER | Facility: HOSPITAL | Age: 32
End: 2022-10-28

## 2022-10-28 VITALS
WEIGHT: 230.8 LBS | HEIGHT: 63 IN | DIASTOLIC BLOOD PRESSURE: 85 MMHG | TEMPERATURE: 98.7 F | HEART RATE: 87 BPM | OXYGEN SATURATION: 95 % | RESPIRATION RATE: 16 BRPM | BODY MASS INDEX: 40.89 KG/M2 | SYSTOLIC BLOOD PRESSURE: 137 MMHG

## 2022-10-28 LAB
ALBUMIN SERPL BCP-MCNC: 1.9 G/DL (ref 3.5–5)
ALP SERPL-CCNC: 155 U/L (ref 46–116)
ALT SERPL W P-5'-P-CCNC: 18 U/L (ref 12–78)
ANION GAP SERPL CALCULATED.3IONS-SCNC: 6 MMOL/L (ref 4–13)
AST SERPL W P-5'-P-CCNC: 25 U/L (ref 5–45)
BASOPHILS # BLD AUTO: 0.04 THOUSANDS/ÂΜL (ref 0–0.1)
BASOPHILS NFR BLD AUTO: 0 % (ref 0–1)
BILIRUB SERPL-MCNC: 0.1 MG/DL (ref 0.2–1)
BUN SERPL-MCNC: 11 MG/DL (ref 5–25)
CALCIUM ALBUM COR SERPL-MCNC: 10 MG/DL (ref 8.3–10.1)
CALCIUM SERPL-MCNC: 8.3 MG/DL (ref 8.3–10.1)
CHLORIDE SERPL-SCNC: 106 MMOL/L (ref 96–108)
CO2 SERPL-SCNC: 28 MMOL/L (ref 21–32)
CREAT SERPL-MCNC: 0.78 MG/DL (ref 0.6–1.3)
EOSINOPHIL # BLD AUTO: 0 THOUSAND/ÂΜL (ref 0–0.61)
EOSINOPHIL NFR BLD AUTO: 0 % (ref 0–6)
ERYTHROCYTE [DISTWIDTH] IN BLOOD BY AUTOMATED COUNT: 15 % (ref 11.6–15.1)
GFR SERPL CREATININE-BSD FRML MDRD: 100 ML/MIN/1.73SQ M
GLUCOSE SERPL-MCNC: 81 MG/DL (ref 65–140)
GLUCOSE SERPL-MCNC: 83 MG/DL (ref 65–140)
GLUCOSE SERPL-MCNC: 91 MG/DL (ref 65–140)
HCT VFR BLD AUTO: 24.7 % (ref 34.8–46.1)
HGB BLD-MCNC: 8.1 G/DL (ref 11.5–15.4)
IMM GRANULOCYTES # BLD AUTO: 0.25 THOUSAND/UL (ref 0–0.2)
IMM GRANULOCYTES NFR BLD AUTO: 1 % (ref 0–2)
LYMPHOCYTES # BLD AUTO: 2.97 THOUSANDS/ÂΜL (ref 0.6–4.47)
LYMPHOCYTES NFR BLD AUTO: 16 % (ref 14–44)
MCH RBC QN AUTO: 32.3 PG (ref 26.8–34.3)
MCHC RBC AUTO-ENTMCNC: 32.8 G/DL (ref 31.4–37.4)
MCV RBC AUTO: 98 FL (ref 82–98)
MONOCYTES # BLD AUTO: 0.9 THOUSAND/ÂΜL (ref 0.17–1.22)
MONOCYTES NFR BLD AUTO: 5 % (ref 4–12)
NEUTROPHILS # BLD AUTO: 14.61 THOUSANDS/ÂΜL (ref 1.85–7.62)
NEUTS SEG NFR BLD AUTO: 78 % (ref 43–75)
NRBC BLD AUTO-RTO: 0 /100 WBCS
PLATELET # BLD AUTO: 218 THOUSANDS/UL (ref 149–390)
PMV BLD AUTO: 10.4 FL (ref 8.9–12.7)
POTASSIUM SERPL-SCNC: 4.1 MMOL/L (ref 3.5–5.3)
PROT SERPL-MCNC: 5.7 G/DL (ref 6.4–8.4)
RBC # BLD AUTO: 2.51 MILLION/UL (ref 3.81–5.12)
SODIUM SERPL-SCNC: 140 MMOL/L (ref 135–147)
WBC # BLD AUTO: 18.77 THOUSAND/UL (ref 4.31–10.16)

## 2022-10-28 RX ORDER — NIFEDIPINE 30 MG/1
30 TABLET, EXTENDED RELEASE ORAL DAILY
Qty: 30 TABLET | Refills: 1 | Status: SHIPPED | OUTPATIENT
Start: 2022-10-29 | End: 2022-11-28

## 2022-10-28 RX ORDER — INSULIN GLARGINE 100 [IU]/ML
20 INJECTION, SOLUTION SUBCUTANEOUS EVERY MORNING
Status: DISCONTINUED | OUTPATIENT
Start: 2022-10-29 | End: 2022-10-28 | Stop reason: HOSPADM

## 2022-10-28 RX ORDER — IBUPROFEN 600 MG/1
600 TABLET ORAL EVERY 6 HOURS PRN
Status: DISCONTINUED | OUTPATIENT
Start: 2022-10-28 | End: 2022-10-28 | Stop reason: HOSPADM

## 2022-10-28 RX ADMIN — NIFEDIPINE 30 MG: 30 TABLET, FILM COATED, EXTENDED RELEASE ORAL at 08:42

## 2022-10-28 RX ADMIN — INSULIN GLARGINE 26 UNITS: 100 INJECTION, SOLUTION SUBCUTANEOUS at 08:42

## 2022-10-28 RX ADMIN — FERROUS SULFATE TAB 325 MG (65 MG ELEMENTAL FE) 325 MG: 325 (65 FE) TAB at 08:42

## 2022-10-28 RX ADMIN — SERTRALINE HYDROCHLORIDE 50 MG: 50 TABLET ORAL at 08:42

## 2022-10-28 RX ADMIN — INSULIN LISPRO 7 UNITS: 100 INJECTION, SOLUTION INTRAVENOUS; SUBCUTANEOUS at 13:02

## 2022-10-28 RX ADMIN — INSULIN LISPRO 7 UNITS: 100 INJECTION, SOLUTION INTRAVENOUS; SUBCUTANEOUS at 08:39

## 2022-10-28 RX ADMIN — ACETAMINOPHEN 650 MG: 325 TABLET, FILM COATED ORAL at 11:21

## 2022-10-28 RX ADMIN — ACETAMINOPHEN 650 MG: 325 TABLET, FILM COATED ORAL at 05:02

## 2022-10-28 NOTE — PLAN OF CARE
Problem: POSTPARTUM  Goal: Experiences normal postpartum course  Description: INTERVENTIONS:  - Monitor maternal vital signs  - Assess uterine involution and lochia  Outcome: Adequate for Discharge  Goal: Appropriate maternal -  bonding  Description: INTERVENTIONS:  - Identify family support  - Assess for appropriate maternal/infant bonding   -Encourage maternal/infant bonding opportunities  - Referral to  or  as needed  Outcome: Adequate for Discharge  Goal: Establishment of infant feeding pattern  Description: INTERVENTIONS:  - Assess breast/bottle feeding  - Refer to lactation as needed  Outcome: Adequate for Discharge  Goal: Incision(s), wounds(s) or drain site(s) healing without S/S of infection  Description: INTERVENTIONS  - Assess and document dressing, incision, wound bed, drain sites and surrounding tissue  - Provide patient and family education  Outcome: Adequate for Discharge     Problem: PAIN - ADULT  Goal: Verbalizes/displays adequate comfort level or baseline comfort level  Description: Interventions:  - Encourage patient to monitor pain and request assistance  - Assess pain using appropriate pain scale  - Administer analgesics based on type and severity of pain and evaluate response  - Implement non-pharmacological measures as appropriate and evaluate response  - Consider cultural and social influences on pain and pain management  - Notify physician/advanced practitioner if interventions unsuccessful or patient reports new pain  Outcome: Adequate for Discharge     Problem: DISCHARGE PLANNING  Goal: Discharge to home or other facility with appropriate resources  Description: INTERVENTIONS:  - Identify barriers to discharge w/patient and caregiver  - Arrange for needed discharge resources and transportation as appropriate  - Identify discharge learning needs (meds, wound care, etc )  - Arrange for interpretive services to assist at discharge as needed  - Refer to Case Management Department for coordinating discharge planning if the patient needs post-hospital services based on physician/advanced practitioner order or complex needs related to functional status, cognitive ability, or social support system  Outcome: Adequate for Discharge     Problem: Potential for Falls  Goal: Patient will remain free of falls  Description: INTERVENTIONS:  - Educate patient/family on patient safety including physical limitations  - Instruct patient to call for assistance with activity   - Consult OT/PT to assist with strengthening/mobility   - Keep Call bell within reach  - Keep bed low and locked with side rails adjusted as appropriate  - Keep care items and personal belongings within reach  - Initiate and maintain comfort rounds  - Make Fall Risk Sign visible to staff  - Apply yellow socks and bracelet for high fall risk patients  - Consider moving patient to room near nurses station  Outcome: Adequate for Discharge

## 2022-10-28 NOTE — PLAN OF CARE
Problem: POSTPARTUM  Goal: Experiences normal postpartum course  Description: INTERVENTIONS:  - Monitor maternal vital signs  - Assess uterine involution and lochia  10/27/2022 2157 by Per Cee RN  Outcome: Progressing     Problem: POSTPARTUM  Goal: Appropriate maternal -  bonding  Description: INTERVENTIONS:  - Identify family support  - Assess for appropriate maternal/infant bonding   -Encourage maternal/infant bonding opportunities  - Referral to  or  as needed  10/27/2022 2157 by Per Cee RN  Outcome: Progressing     Problem: POSTPARTUM  Goal: Establishment of infant feeding pattern  Description: INTERVENTIONS:  - Assess breast/bottle feeding  - Refer to lactation as needed  10/27/2022 2157 by Per Cee RN  Outcome: Progressing     Problem: POSTPARTUM  Goal: Incision(s), wounds(s) or drain site(s) healing without S/S of infection  Description: INTERVENTIONS  - Assess and document dressing, incision, wound bed, drain sites and surrounding tissue  - Provide patient and family education  - Perform skin care/dressing changes every   10/27/2022 2157 by Per Cee RN  Outcome: Progressing     Problem: PAIN - ADULT  Goal: Verbalizes/displays adequate comfort level or baseline comfort level  Description: Interventions:  - Encourage patient to monitor pain and request assistance  - Assess pain using appropriate pain scale  - Administer analgesics based on type and severity of pain and evaluate response  - Implement non-pharmacological measures as appropriate and evaluate response  - Consider cultural and social influences on pain and pain management  - Notify physician/advanced practitioner if interventions unsuccessful or patient reports new pain  10/27/2022 2157 by Per Cee RN  Outcome: Progressing     Problem: DISCHARGE PLANNING  Goal: Discharge to home or other facility with appropriate resources  Description: INTERVENTIONS:  - Identify barriers to discharge w/patient and caregiver  - Arrange for needed discharge resources and transportation as appropriate  - Identify discharge learning needs (meds, wound care, etc )  - Arrange for interpretive services to assist at discharge as needed  - Refer to Case Management Department for coordinating discharge planning if the patient needs post-hospital services based on physician/advanced practitioner order or complex needs related to functional status, cognitive ability, or social support system  10/27/2022 2157 by Christiano eBnz, RN  Outcome: Progressing     Problem: Potential for Falls  Goal: Patient will remain free of falls  Description: INTERVENTIONS:  - Educate patient/family on patient safety including physical limitations  - Instruct patient to call for assistance with activity   - Consult OT/PT to assist with strengthening/mobility   - Keep Call bell within reach  - Keep bed low and locked with side rails adjusted as appropriate  - Keep care items and personal belongings within reach  - Initiate and maintain comfort rounds  - Make Fall Risk Sign visible to staff  - Offer Toileting every  Hours, in advance of need  - Initiate/Maintain alarm  - Obtain necessary fall risk management equipment:   - Apply yellow socks and bracelet for high fall risk patients  - Consider moving patient to room near nurses station  10/27/2022 2157 by Christiano Benz, RN  Outcome: Progressing

## 2022-10-28 NOTE — PLAN OF CARE
Problem: POSTPARTUM  Goal: Experiences normal postpartum course  Description: INTERVENTIONS:  - Monitor maternal vital signs  - Assess uterine involution and lochia  10/28/2022 1326 by Nicole Pleitez RN  Outcome: Completed  10/28/2022 100 by Nicole Pleitez RN  Outcome: Adequate for Discharge  Goal: Appropriate maternal -  bonding  Description: INTERVENTIONS:  - Identify family support  - Assess for appropriate maternal/infant bonding   -Encourage maternal/infant bonding opportunities  - Referral to  or  as needed  10/28/2022 1326 by Nicole Pleitez RN  Outcome: Completed  10/28/2022 100 by Nicole Pleitez RN  Outcome: Adequate for Discharge  Goal: Establishment of infant feeding pattern  Description: INTERVENTIONS:  - Assess breast/bottle feeding  - Refer to lactation as needed  10/28/2022 1326 by Nicole Pleitez RN  Outcome: Completed  10/28/2022 1001 by Nicole Pleitez RN  Outcome: Adequate for Discharge  Goal: Incision(s), wounds(s) or drain site(s) healing without S/S of infection  Description: INTERVENTIONS  - Assess and document dressing, incision, wound bed, drain sites and surrounding tissue  - Provide patient and family education  10/28/2022 1326 by Nicole Pleitez RN  Outcome: Completed  10/28/2022 1001 by Nicole Pleitez RN  Outcome: Adequate for Discharge     Problem: PAIN - ADULT  Goal: Verbalizes/displays adequate comfort level or baseline comfort level  Description: Interventions:  - Encourage patient to monitor pain and request assistance  - Assess pain using appropriate pain scale  - Administer analgesics based on type and severity of pain and evaluate response  - Implement non-pharmacological measures as appropriate and evaluate response  - Consider cultural and social influences on pain and pain management  - Notify physician/advanced practitioner if interventions unsuccessful or patient reports new pain  10/28/2022 1326 by Nicole Pleitez RN  Outcome: Completed  10/28/2022 1001 by Aldair Trinidad RN  Outcome: Adequate for Discharge     Problem: DISCHARGE PLANNING  Goal: Discharge to home or other facility with appropriate resources  Description: INTERVENTIONS:  - Identify barriers to discharge w/patient and caregiver  - Arrange for needed discharge resources and transportation as appropriate  - Identify discharge learning needs (meds, wound care, etc )  - Arrange for interpretive services to assist at discharge as needed  - Refer to Case Management Department for coordinating discharge planning if the patient needs post-hospital services based on physician/advanced practitioner order or complex needs related to functional status, cognitive ability, or social support system  10/28/2022 1326 by Aldair Trinidad RN  Outcome: Completed  10/28/2022 1001 by Aldair Trinidad RN  Outcome: Adequate for Discharge     Problem: Potential for Falls  Goal: Patient will remain free of falls  Description: INTERVENTIONS:  - Educate patient/family on patient safety including physical limitations  - Instruct patient to call for assistance with activity   - Consult OT/PT to assist with strengthening/mobility   - Keep Call bell within reach  - Keep bed low and locked with side rails adjusted as appropriate  - Keep care items and personal belongings within reach  - Initiate and maintain comfort rounds  - Make Fall Risk Sign visible to staff  - Apply yellow socks and bracelet for high fall risk patients  - Consider moving patient to room near nurses station  10/28/2022 1326 by Aldair Trinidad RN  Outcome: Completed  10/28/2022 1001 by Aldair Trinidad RN  Outcome: Adequate for Discharge

## 2022-10-28 NOTE — NURSING NOTE
Discharge education provided to patient and significant other at bedside  All questions answered   POST birth magnet provided to patient

## 2022-10-28 NOTE — DISCHARGE SUMMARY
Discharge Summary - OB/GYN   [employer] 34 y o  female MRN: [id number]  Unit/Bed#: -01 Encounter: [id number]      Admission Date: 10/24/2022     Discharge Date: 10/28/2022    Admitting Diagnosis:   1  Pregnancy at 38w3d  2  Type 2 DM  3  Preeclampsia    Discharge Diagnosis:   Same, delivered    Procedures: primary  section, low transverse incision    Delivery Attending: Jung Cyr MD  Discharge Attending: Dr Oleg Hamm Course:     Danial Blair is a 34 y o   at 38w3d wks who was initially admitted for IOL  She delivered a viable male  on 10/25/2022 at 2142  Weight 8lbs 9 6oz via primary  section, low transverse incision  Apgars were 4 (1 min) and 7 (5 min)   was transferred to  nursery  Patient tolerated the procedure well and was transferred to recovery in stable condition  Her post-operative course was complicated by elevated blood pressure and glucose levels  Procardia 30 mg XL started for management of HTN  Endocrinologist consulted for management of diabetes  Preoperative hemoglobin was 7 7, postoperative was 8 1  Her postoperative pain was well controlled with oral analgesics  On day of discharge, she was ambulating and able to reasonably perform all ADLs  She was voiding and had appropriate bowel function  Pain was well controlled  She was discharged home on post-operative day #3 without complications  Patient was instructed to follow up with her OB as an outpatient and was given appropriate warnings to call provider if she develops signs of infection or uncontrolled pain  Complications: none apparent    Condition at discharge: good     Discharge instructions/Information to patient and family:   See after visit summary for information provided to patient and family  Provisions for Follow-Up Care:  See after visit summary for information related to follow-up care and any pertinent home health orders  Disposition: Home    Planned Readmission: No    Discharge Medications:   For a complete list of the patient's medications, please refer to her med rec         11:38 AM  10/28/2022

## 2022-10-28 NOTE — PROGRESS NOTES
Post- Progress note    Patient is post-op day 3 from a  delivery  Pain: controlled  Tolerating Oral Intake: yes  Voiding: yes  Flatus:yes  Ambulating: yes  Breastfeeding: well  Chest Pain: no  Shortness of Breath: no  Leg Pain/Discomfort: no  Lochia: minimal      Objective:   Vitals:    10/28/22 0752   BP: 137/85   Pulse: 87   Resp: 16   Temp: 98 7 °F (37 1 °C)   SpO2: 95%       Intake/Output Summary (Last 24 hours) at 10/28/2022 1134  Last data filed at 10/28/2022 1101  Gross per 24 hour   Intake 730 ml   Output 2350 ml   Net -1620 ml       Physical Exam:  General: alert awake oriented  Cardiovascular: RRR  Lungs: clear  Abdomen:soft, nontender, minimal distention  Fundus: below umbilicus  Incision: clean dry intact  Lower extremeties: no evidence of DBT    Assessment and Plan:  34y o  year-old , postoperative day3 status-post  delivery  PLAN:     1  Hypertension - s/p Magnesium sulfate x 24 hr postpartum;  Labs yesterday c/w resolving pre-eclampsia;  Oral Procardia XL 30 mg started yesterday to control hypertension  2   BABATUNDE - Cr peak at 1 33 immediately after delivery, this AM 0 78     3  Chorioamnionitis/III - never had fever but long labor with ruptured membranes and WBC elevated to peak 28 11;  S/p Unasyn x 24 hours,  Remains afebrile     4  Type II DM - blood sugar control per endocrine    Patient to f/u with Endocrinologist as an outpatient     Plan to discharge to home today    [place]

## 2022-10-28 NOTE — PLAN OF CARE
Problem: POSTPARTUM  Goal: Experiences normal postpartum course  Description: INTERVENTIONS:  - Monitor maternal vital signs  - Assess uterine involution and lochia  Outcome: Progressing  Goal: Appropriate maternal -  bonding  Description: INTERVENTIONS:  - Identify family support  - Assess for appropriate maternal/infant bonding   -Encourage maternal/infant bonding opportunities  - Referral to  or  as needed  Outcome: Progressing  Goal: Establishment of infant feeding pattern  Description: INTERVENTIONS:  - Assess breast/bottle feeding  - Refer to lactation as needed  Outcome: Progressing  Goal: Incision(s), wounds(s) or drain site(s) healing without S/S of infection  Description: INTERVENTIONS  - Assess and document dressing, incision, wound bed, drain sites and surrounding tissue  - Provide patient and family education  - Perform skin care/dressing changes every   Outcome: Progressing     Problem: PAIN - ADULT  Goal: Verbalizes/displays adequate comfort level or baseline comfort level  Description: Interventions:  - Encourage patient to monitor pain and request assistance  - Assess pain using appropriate pain scale  - Administer analgesics based on type and severity of pain and evaluate response  - Implement non-pharmacological measures as appropriate and evaluate response  - Consider cultural and social influences on pain and pain management  - Notify physician/advanced practitioner if interventions unsuccessful or patient reports new pain  Outcome: Progressing     Problem: DISCHARGE PLANNING  Goal: Discharge to home or other facility with appropriate resources  Description: INTERVENTIONS:  - Identify barriers to discharge w/patient and caregiver  - Arrange for needed discharge resources and transportation as appropriate  - Identify discharge learning needs (meds, wound care, etc )  - Arrange for interpretive services to assist at discharge as needed  - Refer to Case Management Department for coordinating discharge planning if the patient needs post-hospital services based on physician/advanced practitioner order or complex needs related to functional status, cognitive ability, or social support system  Outcome: Progressing     Problem: Potential for Falls  Goal: Patient will remain free of falls  Description: INTERVENTIONS:  - Educate patient/family on patient safety including physical limitations  - Instruct patient to call for assistance with activity   - Consult OT/PT to assist with strengthening/mobility   - Keep Call bell within reach  - Keep bed low and locked with side rails adjusted as appropriate  - Keep care items and personal belongings within reach  - Initiate and maintain comfort rounds  - Make Fall Risk Sign visible to staff  - Offer Toileting every  Hours, in advance of need  - Initiate/Maintain alarm  - Obtain necessary fall risk management equipment:  - Apply yellow socks and bracelet for high fall risk patients  - Consider moving patient to room near nurses station  Outcome: Progressing

## 2022-10-28 NOTE — LACTATION NOTE
This note was copied from a baby's chart  Met with parents to follow up and discuss the Breastfeeding Discharge Booklet  Mother discussed that she is not pumping at this time and will restart pumping once she is home  Mother was reminded of the importance to frequently pump to build a good milk supply  Showed the feeding log to could be continued using once home for up to the week and discussed the importance of ensuring that baby feeds 8-12x in 24 hours and that baby has 6-8 wet diapers as well as 3-4 soiled diapers (looking for stool transition from meconium to a yellow/gold seedy loose stool)  Mother given resources to look up medications to ensure they are safe with breastfeeding, by communicating with the [employer], [address] as well as using [employer] (assisted mother to pin to home screen on personal phone)    Discussed engorgement time frame (when mature milk comes in) and management as well as how to deal with conditions that may occur while breastfeeding (plugged ducts, milk blebs and mastitis) and when is appropriate to communicate with her OB/GYN and/or a lactation consultant  Mother feels comfortable in knowing how to set up a pump, how to cycle (stimulation vs expression phases during a pumping session), flange fit, milk storage and cleaning  Mother shown handouts for tips on pumping when returning to work and paced bottle feeding that was discussed and demonstrated  Mother shown community resources for continued support in breastfeeding once discharged home  She was encouraged to communicate with [facility], [facility] for lactation home visits and/or with her baby's pediatrician for lactation support/services that could be offered in the practice  Parents were encouraged to call for further questions that arise prior to discharge

## 2022-10-29 NOTE — TELEPHONE ENCOUNTER
Contacted by hospital  by juan antonio with message that patient was requesting a call back  Called patient who requested recommendations on insulin to take after discharge  Per chart review, AVS discharge medications listed pre-delivery doses of insulin  Pt took fingerstick blood sugar about 1 hour after dinner and it was 160, she did not take insulin with dinner today and has had no further insulin since discharge  Reviewed hospital blood sugars, pt received 26 units lantus this AM and 7 units humalog with meals  Advised pt restart metformin tomorrow  If blood sugars are greater than 200 tomorrow AM, she will contact our office  or office number and we will plan to start a lower dose Basaglar  Novolog correctional scale was provided as follows - 150-200 1 unit, 200-250 2 units, 250-300 3 units, 300-350 4 units, 350-400 5 units (1 unit for every 50 over 150)      Martha Martin DO  Endocrinology Fellow, [id number]

## 2022-11-01 ENCOUNTER — POSTPARTUM VISIT (OUTPATIENT)
Dept: OBGYN CLINIC | Facility: CLINIC | Age: 32
End: 2022-11-01

## 2022-11-01 VITALS
DIASTOLIC BLOOD PRESSURE: 88 MMHG | BODY MASS INDEX: 35.65 KG/M2 | WEIGHT: 201.2 LBS | SYSTOLIC BLOOD PRESSURE: 142 MMHG | HEIGHT: 63 IN

## 2022-11-01 DIAGNOSIS — O24.113 PREGNANCY COMPLICATED BY PRE-EXISTING TYPE 2 DIABETES IN THIRD TRIMESTER: ICD-10-CM

## 2022-11-01 NOTE — ASSESSMENT & PLAN NOTE
Severe preecampsia at delivery  Pt started on Procaria XL 30mg daily at discharge  BP mildly elevated here  States at home 130s/80s  Continue medication  Reviewed hypotension precautions

## 2022-11-01 NOTE — PATIENT INSTRUCTIONS
- continue Procardia XL 30mg daily - hold if HR systolic blood pressure less than 100   - call office to discuss is lightheaded or dizziness  - continue daily blood pressure monitoring     - no lifting >10 lbs  - no driving 1 week    - pelvic rest

## 2022-11-01 NOTE — ASSESSMENT & PLAN NOTE
Currently following with endocrinology  On metformin 500mg am and 1000mg at dinner  States she has insulin ordered at sliding scale for hyperglycemia but hasn't needed it  Seeing endo 11/15/2022 for f/u        Lab Results   Component Value Date    HGBA1C 6 4 (H) 08/15/2022

## 2022-11-01 NOTE — PROGRESS NOTES
1 week [address] [facility], [address], [facility], [address]    Assessment/Plan:  Tommy York is a 34y o  year old  who presents for postpartum for incision and BP check  Routine Postpartum Care  1  Normal postpartum exam  2  Feeding:  She is bottle feeding  3  Psychosocial support: good  4  Follow up in: 5 weeks for pp visit       Additional Problems:  1   delivery delivered  Comments:  incision healing well  Using tylenol only for pain  Reviewed limitations  2  Pre-eclampsia, severe, delivered with postpartum condition  Assessment & Plan:  Severe preecampsia at delivery  Pt started on Procaria XL 30mg daily at discharge  BP mildly elevated here  States at home 130s/80s  Continue medication  Reviewed hypotension precautions  3  Pregnancy complicated by pre-existing type 2 diabetes in third trimester  Assessment & Plan:  Currently following with endocrinology  On metformin 500mg am and 1000mg at dinner  States she has insulin ordered at sliding scale for hyperglycemia but hasn't needed it  Seeing endo 11/15/2022 for f/u  Lab Results   Component Value Date    HGBA1C 6 4 (H) 08/15/2022           Next visit: 3 months Wellness    Subjective:     CC: Postpartum visit    Marley James is a 34 y o  y o  female Olinda Recio who presents for a postpartum visit at 1 week  Her delivery was complicated by severe Preeclampsia and she was discharged home on Procardia XL 30mg  She is 1 week postpartum following a low cervical transverse  section on 10/25/2022 at 38 weeks  Bleeding thin lochia   Bowel function is normal  Bladder function is normal     The following portions of the patient's history were reviewed and updated as appropriate: allergies, current medications, past family history, past medical history, obstetric history, gynecologic history, past social history, past surgical history and problem list       Objective:  /88 (BP Location: Left arm, Patient Position: Sitting, Cuff Size: Standard)   Ht 5' 3" (1 6 m)   Wt 91 3 kg (201 lb 3 2 oz)   LMP 01/15/2022   Breastfeeding No   BMI 35 64 kg/m²   Pregravid Weight/BMI: 86 2 kg (190 lb) (BMI 33 67)  Current Weight: 91 3 kg (201 lb 3 2 oz)   Total Weight Gain: 18 5 kg (40 lb 12 8 oz)     General: Well appearing, no distress  Mood and affect: Appropriate    Abdomen: Soft, nontender  Incision: well headed with steristrips in place

## 2022-11-15 ENCOUNTER — OFFICE VISIT (OUTPATIENT)
Dept: ENDOCRINOLOGY | Facility: HOSPITAL | Age: 32
End: 2022-11-15

## 2022-11-15 VITALS
WEIGHT: 196.6 LBS | HEART RATE: 119 BPM | DIASTOLIC BLOOD PRESSURE: 78 MMHG | SYSTOLIC BLOOD PRESSURE: 120 MMHG | BODY MASS INDEX: 34.84 KG/M2 | HEIGHT: 63 IN

## 2022-11-15 DIAGNOSIS — O99.810 HYPERGLYCEMIA IN PREGNANCY: ICD-10-CM

## 2022-11-15 DIAGNOSIS — O24.113 PREGNANCY COMPLICATED BY PRE-EXISTING TYPE 2 DIABETES IN THIRD TRIMESTER: ICD-10-CM

## 2022-11-15 DIAGNOSIS — R73.03 PREDIABETES: Primary | ICD-10-CM

## 2022-11-15 PROBLEM — O36.63X0 EXCESSIVE FETAL GROWTH AFFECTING MANAGEMENT OF PREGNANCY IN THIRD TRIMESTER: Status: RESOLVED | Noted: 2022-09-13 | Resolved: 2022-11-15

## 2022-11-15 PROBLEM — Z67.91 RH NEGATIVE STATUS DURING PREGNANCY: Status: RESOLVED | Noted: 2022-04-07 | Resolved: 2022-11-15

## 2022-11-15 PROBLEM — R06.83 SNORING: Status: RESOLVED | Noted: 2022-05-10 | Resolved: 2022-11-15

## 2022-11-15 PROBLEM — O26.899 RH NEGATIVE STATUS DURING PREGNANCY: Status: RESOLVED | Noted: 2022-04-07 | Resolved: 2022-11-15

## 2022-11-15 PROBLEM — O99.210 OBESITY AFFECTING PREGNANCY, ANTEPARTUM: Status: RESOLVED | Noted: 2022-03-16 | Resolved: 2022-11-15

## 2022-11-15 NOTE — PROGRESS NOTES
Established Patient Progress Note       Chief Complaint   Patient presents with   • Diabetes Mellitus        History of Present Illness:     Karen Jc is a 34 y o  female with a history of prediabetes with HbA1C of 6 3% 2021 not on any therapy who had an early first trimester abnormal 1hr OGTT followed by confirmatory 3hr OGTT and therefore was started initially on diet management/metformin with rapid insulin resistance, transitioned to metformin 500/1000mg+ Basal/bolus regime during first trimester  Did not have any issues with hypoglycemia during first trimester  Did have some mild hypoglycemia during end of third trimester but did not have placental insufficiency  Maintained on basal/bolus regime during pregnancy with highest Insulin doses of basalar 96u and Novolog 24u TID with meals at end of third trimester  Patient underwent  at 45 weeks d/t mild preeclampsia  Diabetic course during pregnancy monitored only through HbA1C per review and remained in 6 4-6 5% range- followed MFM during pregnancy  Delivered healthy male baby Bhavesh on 10/25/2022 weight 8lbs and 10onc  Baby did have mild hypoglycemia post birth but self resolved  Patient did also have mild WBC/inflammation post  from unclear cause Tx with antibiotics and resolved after  Transitioned off insulin prior to discharge and only continued on metformin at 500mg/1000mg AM/PM daily on discharge  Patient now comes for post partum follow up  Indicates she has only been taking 500mg metformin in morning if at all  Inconsistent use  Checks BG periodically 1-2x per week and only fasting and ranges in 120 range  If currently primarily bottle feeding only  Denies any issues with polyuria, polydipsia, nausea, vomiting, blurry vision, drastic weight changes, headaches, numbness/tingling hands or feet  Did not have any issues with retinopathy, neuropathy or nephropathy previously noted       Family history:- Both paternal and maternal grandmothers had T2DM  No thyroid illness, or any other autoimmune condition  Social history:- Works as LPN at [employer]  Current on Maternity leave, restarting work in   No smoking/alcohol use  Patient Active Problem List   Diagnosis   • Chronic fatigue   • Polyarthralgia   • Moderate recurrent major depression (HCC)   • [place] (generalized anxiety disorder)   • Prediabetes   • Hyperglycemia in pregnancy   • Pregnancy complicated by pre-existing type 2 diabetes in third trimester   • Pre-eclampsia, severe, delivered with postpartum condition      Past Medical History:   Diagnosis Date   • Chronic fatigue    • Depression     currently on Zoloft 50 mg managed by PCP  Past Surgical History:   Procedure Laterality Date   • MS  DELIVERY ONLY N/A 10/25/2022    Procedure:  SECTION (); Surgeon: Acosta Gutierrez MD;  Location: [facility];  Service: Obstetrics   • TONSILECTOMY AND ADNOIDECTOMY     • WISDOM TOOTH EXTRACTION  2019      Family History   Problem Relation Age of Onset   • Thyroid disease Mother    • Diabetes type II Father    • Diabetes type II Maternal Grandmother    • Cancer Maternal Grandfather    • Lung cancer Maternal Grandfather    • Diabetes type II Maternal Grandfather    • Breast cancer Neg Hx    • Ovarian cancer Neg Hx    • Colon cancer Neg Hx    • Down syndrome Neg Hx    • Fragile X syndrome Neg Hx    • Cystic fibrosis Neg Hx      Social History     Tobacco Use   • Smoking status: Former Smoker     Packs/day: 0 50     Years: 10 00     Pack years: 5 00     Types: Cigarettes     Quit date: 2019     Years since quitting: 3 5   • Smokeless tobacco: Never Used   Substance Use Topics   • Alcohol use: Not Currently     Alcohol/week: 2 0 standard drinks     Types: 2 Standard drinks or equivalent per week     No Known Allergies    Current Outpatient Medications:   •  Accu-Chek Guide test strip, Test 4 times a day and as instructed   GDM , Disp: 100 each, Rfl: 3  •  Accu-Chek Softclix Lancets lancets, Use 4 a day  GDM , Disp: 100 each, Rfl: 7  •  Blood Glucose Monitoring Suppl (Accu-Chek Guide Me) w/Device KIT, Dispense 1 kit  GDM , Disp: 1 kit, Rfl: 0  •  [START ON 1/25/2023] Glucose, Tolerance Test, LIQD, Take 75 g by mouth 1 (one) time for 1 dose Do not start before January 25, 2023 , Disp: 1 each, Rfl: 0  •  metFORMIN (GLUCOPHAGE) 500 mg tablet, Take 1 tablet (500 mg total) by mouth daily with breakfast, Disp: 100 tablet, Rfl: 0  •  NIFEdipine (PROCARDIA XL) 30 mg 24 hr tablet, Take 1 tablet (30 mg total) by mouth daily Do not start before October 29, 2022 , Disp: 30 tablet, Rfl: 1  •  Prenatal MV-Min-Fe Fum-FA-DHA (PRENATAL 1 PO), Take 1 tablet by mouth daily, Disp: , Rfl:   •  sertraline (ZOLOFT) 50 mg tablet, TAKE 1 TABLET BY MOUTH EVERY DAY, Disp: 90 tablet, Rfl: 1    Review of Systems   Constitutional: Negative for activity change, appetite change, fatigue, fever and unexpected weight change  Eyes: Negative for photophobia and visual disturbance  Gastrointestinal: Negative for abdominal distention, abdominal pain, diarrhea and nausea  Endocrine: Negative for polydipsia and polyuria  Musculoskeletal: Negative  Skin: Negative  Physical Exam:  Body mass index is 34 83 kg/m²  /78   Pulse (!) 119   Ht 5' 3" (1 6 m)   Wt 89 2 kg (196 lb 9 6 oz)   LMP 01/15/2022 Comment: Post partum OGTT 6 weeks  Breastfeeding Yes   BMI 34 83 kg/m²    Wt Readings from Last 3 Encounters:   11/15/22 89 2 kg (196 lb 9 6 oz)   11/01/22 91 3 kg (201 lb 3 2 oz)   10/24/22 105 kg (230 lb 12 8 oz)       Physical Exam  Constitutional:       Appearance: Normal appearance  Cardiovascular:      Rate and Rhythm: Normal rate and regular rhythm  Pulses: Normal pulses  Pulmonary:      Effort: Pulmonary effort is normal       Breath sounds: Normal breath sounds  Abdominal:      General: Abdomen is flat  Palpations: Abdomen is soft  Comments:  Well healing surgical scar   Skin:     General: Skin is warm and dry  Capillary Refill: Capillary refill takes less than 2 seconds  Neurological:      General: No focal deficit present  Mental Status: She is alert and oriented to person, place, and time  Psychiatric:         Mood and Affect: Mood normal          Behavior: Behavior normal        Labs:      Latest Reference Range & Units 08/13/21 11:02 05/24/22 07:49 08/15/22 09:56   Hemoglobin A1C 4 8 - 5 6 % 6 3 6 5 (H) 6 4 (H)   (H): Data is abnormally high   Ref Range & Units 10/24/22  2:57 PM    Creatinine, Ur mg/dL 68 0    Protein Urine Random mg/dL 44    Prot/Creat Ratio, Ur 0 00 - 0 10 0 65 High         Impression & Plan:    Problem List Items Addressed This Visit        Endocrine    Hyperglycemia in pregnancy    Pregnancy complicated by pre-existing type 2 diabetes in third trimester    Relevant Medications    Glucose, Tolerance Test, LIQD (Start on 1/25/2023)    metFORMIN (GLUCOPHAGE) 500 mg tablet    Other Relevant Orders    Glucose STACI 2HR 75GM Nonpreg       Other    Prediabetes - Primary        Patient is a 32yF with Prediabetes not on any therapy pre-pregnancy with progression of hyperglycemia during pregnancy needing high dose insulin + metformin who without any known macro/microvascular complications presents today for her post partum diabetic follow up  Patient seen by Dr Manolo Haynes while in hospital and discharged on metformin 500/1000mg 3 weeks ago  Inconsistently taking 500mg metformin only for now with fasting BG in range  Given this, recommended to continue only taking 500mg metformin for now consistent until we recheck her OGTT in 12 weeks post partum to see if she has underlying T2DM or not  If 2hr OGTT normal/abnormal, will still repeat HbA1C to then to see if has diabetes/prediabetes at that time for second confirmatory testing  Can decide on further treatment plan based on this   Discussed that some given known prediabetes and rapid progression of insulin resistance in first trimester goes against GDM which generally develops mainly in 2nd/3rd trimester, therefore would certainly continue with metformin for now d/t high risk of having underlying T2DM vs Prediabetes  Can continue working on lifestyle modifications in the meantime to reduce risk of progression to diabetes  Will assess need for routine screening with retinopathy, Lipid and Microalbumin in 3 months follow up based on if has progressed to T2DM vs remains prediabetic  Informed patient to check BG 2-3x weekly and alternate fasting and bedtime for more BG variability  If BG >200 to please reach out to me and can do OGTT earlier (6 weeks-12 weeks post partum average range for repeat testing)  Discussed with the patient and all questioned fully answered  She will call me if any problems arise  Follow-up appointment in 2-3 months       Counseled patient on diagnostic results, prognosis, risk and benefit of treatment options, instruction for management, importance of treatment compliance, Risk  factor reduction and impressions    Kamran Wilder MD

## 2022-12-07 ENCOUNTER — POSTPARTUM VISIT (OUTPATIENT)
Dept: OBGYN CLINIC | Facility: CLINIC | Age: 32
End: 2022-12-07

## 2022-12-07 VITALS
DIASTOLIC BLOOD PRESSURE: 78 MMHG | WEIGHT: 199.4 LBS | BODY MASS INDEX: 35.33 KG/M2 | SYSTOLIC BLOOD PRESSURE: 116 MMHG | HEIGHT: 63 IN

## 2022-12-07 DIAGNOSIS — F32.0 CURRENT MILD EPISODE OF MAJOR DEPRESSIVE DISORDER WITHOUT PRIOR EPISODE (HCC): ICD-10-CM

## 2022-12-07 RX ORDER — SERTRALINE HYDROCHLORIDE 25 MG/1
75 TABLET, FILM COATED ORAL DAILY
Qty: 90 TABLET | Refills: 1 | Status: SHIPPED | OUTPATIENT
Start: 2022-12-07

## 2022-12-07 NOTE — PROGRESS NOTES
[address], [address], [facility], [address] [facility]    Assessment/Plan:  Kristal Echols is a 34y o  year old  who presents for postpartum visit  Routine Postpartum Care  1  Normal postpartum exam  2  Contraception: Options reviewed  Desires Paragard IUD  Will have office staff complete verification of benefits and schedule insertion visit  3  Depression Screen: Medium risk  Discussed options, including psychotherapy versus increasing Zoloft  She denies SI/HI  Desires to increase medication dosing  Rx for Zoloft 75 mg PO daily provided  4  Feeding: Bottle  5  Cervical cancer screening Up to Date  6  Follow up for Paragard IUD insertion, the 1 month for follow up of depression, then 3 months for annual exam or as needed  Additional Problems:  1  Postpartum examination following  delivery    2  Current mild episode of major depressive disorder without prior episode (HCC)  -     sertraline (Zoloft) 25 mg tablet; Take 3 tablets (75 mg total) by mouth daily    3  Pre-eclampsia, severe, delivered with postpartum condition  Assessment & Plan:  - Blood pressures have normalized  Will discontinue Procardia  Subjective:     CC: Postpartum visit    Zhang Salazar is a 34 y o  y o  female  who presents for a postpartum visit  She is 6 weeks postpartum following primary  section on 10/25/2022 at 38 weeks  Postpartum course has been complicated by pre-eclampsia with severe features  Delivery was complicated by hemorrhage with QBL of 2220 mL, but did not require blood products  Bleeding no bleeding  Bowel function is normal  Bladder function is normal  Patient is not sexually active       Postpartum Depression: Medium Risk   • Last EPDS Total Score: 9   • Last EPDS Self Harm Result: Never       The following portions of the patient's history were reviewed and updated as appropriate: allergies, current medications, past family history, past medical history, obstetric history, gynecologic history, past social history, past surgical history and problem list     Objective:  /78 (BP Location: Left arm, Patient Position: Sitting, Cuff Size: Large)   Ht 5' 3" (1 6 m)   Wt 90 4 kg (199 lb 6 4 oz)   Breastfeeding No   BMI 35 32 kg/m²   Pregravid Weight/BMI: No episode found (BMI Could not be calculated)  Current Weight: 90 4 kg (199 lb 6 4 oz)   Total Weight Gain: Not found  Pre-Sofia Vitals    Flowsheet Row Most Recent Value   Prenatal Assessment    Prenatal Vitals    Blood Pressure 116/78   Weight - Scale 90 4 kg (199 lb 6 4 oz)   Urine Albumin/Glucose    Dilation/Effacement/Station    Vaginal Drainage    Edema            Chaperone present? Yes: Khushbu Luna MA  General Appearance: alert and oriented, in no acute distress  Abdomen: Soft, non-tender, non-distended, no masses, no rebound or guarding  Pfannenstiel incision well-healed  Pelvic:       External genitalia: Normal appearance, no abnormal pigmentation, no lesions or masses  Normal Bartholin's and [place]'s  Urinary system: Urethral meatus normal, bladder non-tender  Vaginal: normal mucosa without prolapse or lesions  Normal-appearing physiologic discharge  Cervix: Normal-appearing, well-epithelialized, no gross lesions or masses  No cervical motion tenderness  Adnexa: No adnexal masses or tenderness noted  Uterus: Normal-sized, regular contour, midline, mobile, no uterine tenderness  Extremities: Normal range of motion     Skin: normal, no rash or abnormalities  Neurologic: alert, oriented x3  Psychiatric: Appropriate affect, mood stable, cooperative with exam         Gerry Engel MD  2022 12:52 PM

## 2023-01-16 ENCOUNTER — PROCEDURE VISIT (OUTPATIENT)
Dept: OBGYN CLINIC | Facility: CLINIC | Age: 33
End: 2023-01-16

## 2023-01-16 VITALS
SYSTOLIC BLOOD PRESSURE: 128 MMHG | DIASTOLIC BLOOD PRESSURE: 78 MMHG | BODY MASS INDEX: 35.4 KG/M2 | WEIGHT: 199.8 LBS | HEIGHT: 63 IN

## 2023-01-16 DIAGNOSIS — Z30.430 ENCOUNTER FOR INSERTION OF COPPER IUD: ICD-10-CM

## 2023-01-16 DIAGNOSIS — Z11.3 ROUTINE SCREENING FOR STI (SEXUALLY TRANSMITTED INFECTION): ICD-10-CM

## 2023-01-16 DIAGNOSIS — Z01.812 PRE-PROCEDURE LAB EXAM: Primary | ICD-10-CM

## 2023-01-16 LAB — SL AMB POCT URINE HCG: NEGATIVE

## 2023-01-16 RX ORDER — COPPER 313.4 MG/1
1 INTRAUTERINE DEVICE INTRAUTERINE ONCE
Status: COMPLETED | OUTPATIENT
Start: 2023-01-16 | End: 2023-01-16

## 2023-01-16 RX ADMIN — COPPER 1 INTRA UTERINE DEVICE: 313.4 INTRAUTERINE DEVICE INTRAUTERINE at 14:59

## 2023-01-18 LAB
C TRACH RRNA SPEC QL NAA+PROBE: NEGATIVE
N GONORRHOEA RRNA SPEC QL NAA+PROBE: NEGATIVE

## 2023-01-27 ENCOUNTER — HOSPITAL ENCOUNTER (OUTPATIENT)
Dept: SLEEP CENTER | Facility: HOSPITAL | Age: 33
Discharge: HOME | End: 2023-01-27
Attending: INTERNAL MEDICINE

## 2023-01-27 DIAGNOSIS — G47.19 EXCESSIVE DAYTIME SLEEPINESS: ICD-10-CM

## 2023-01-27 DIAGNOSIS — R06.83 SNORING: ICD-10-CM

## 2023-01-28 NOTE — PROGRESS NOTES
Home Sleep Study Documentation    HOME STUDY DEVICE: Noxturnal yes                                           Theresa G3 no      Pre-Sleep Home Study:    Set-up and instructions performed by: KEATON Valles    Technician performed demonstration for Patient: yes    Return demonstration performed by Patient: yes    Written instructions provided to Patient: yes    Patient signed consent form: yes        Post-Sleep Home Study:    Additional comments by Patient: none    Home Sleep Study Failed:no:    Failure reason: N/A    Reported or Detected: N/A    [facility]:  [facility]

## 2023-02-08 ENCOUNTER — TELEPHONE (OUTPATIENT)
Dept: SLEEP CENTER | Facility: CLINIC | Age: 33
End: 2023-02-08

## 2023-02-08 NOTE — TELEPHONE ENCOUNTER
Sleep study shows no CHELSEA   May need to be repeated to r/o false negative     Needs to scheduled for follow up with Dr Ren Hadley     Left call back message

## 2023-03-08 ENCOUNTER — ANNUAL EXAM (OUTPATIENT)
Dept: OBGYN CLINIC | Facility: CLINIC | Age: 33
End: 2023-03-08

## 2023-03-08 VITALS
WEIGHT: 198.2 LBS | BODY MASS INDEX: 35.12 KG/M2 | HEIGHT: 63 IN | SYSTOLIC BLOOD PRESSURE: 114 MMHG | DIASTOLIC BLOOD PRESSURE: 78 MMHG

## 2023-03-08 DIAGNOSIS — Z30.431 ENCOUNTER FOR ROUTINE CHECKING OF INTRAUTERINE CONTRACEPTIVE DEVICE (IUD): ICD-10-CM

## 2023-03-08 DIAGNOSIS — F32.0 CURRENT MILD EPISODE OF MAJOR DEPRESSIVE DISORDER WITHOUT PRIOR EPISODE (HCC): ICD-10-CM

## 2023-03-08 DIAGNOSIS — Z01.419 WOMEN'S ANNUAL ROUTINE GYNECOLOGICAL EXAMINATION: Primary | ICD-10-CM

## 2023-03-08 PROBLEM — Z97.5 IUD (INTRAUTERINE DEVICE) IN PLACE: Status: ACTIVE | Noted: 2023-03-08

## 2023-03-08 RX ORDER — SERTRALINE HYDROCHLORIDE 25 MG/1
75 TABLET, FILM COATED ORAL DAILY
Qty: 270 TABLET | Refills: 4 | Status: SHIPPED | OUTPATIENT
Start: 2023-03-08

## 2023-03-08 RX ORDER — COPPER 313.4 MG/1
1 INTRAUTERINE DEVICE INTRAUTERINE ONCE
COMMUNITY
Start: 2023-01-16

## 2023-03-08 NOTE — PROGRESS NOTES
[address], [address], [facility], [address] [facility]    ASSESSMENT/PLAN: Mana Galvez is a 34 y o  Leda Hodge who presents for annual gynecologic exam     Encounter for routine gynecologic examination  - Routine well woman exam completed today  - Cervical Cancer Screening: Current ASCCP Guidelines reviewed  Last Pap: 01/20/2022  History of abnormal: None  - HPV Vaccination status: Immunization series complete  - STI screening offered including HIV testing: offered, pt declined  - Contraceptive counseling discussed  Current contraception: IUD, Satisfied    - The following were reviewed in today's visit: breast self exam, exercise and healthy diet    Additional problems addressed during this visit:  1  Women's annual routine gynecological examination    2  Current mild episode of major depressive disorder without prior episode ([facility] [address] )  Assessment & Plan:  - Symptoms stable on Zoloft 75 mg PO daily  Denies SI/HI  Desires to continue  Rx refilled  Orders:  -     sertraline (ZOLOFT) 25 mg tablet; Take 3 tablets (75 mg total) by mouth daily    3  Encounter for routine checking of intrauterine contraceptive device (IUD)      CC: Annual Gynecologic Examination    HPI: Mana Galvez is a 34 y o  Leda Hodge who presents for annual gynecologic examination  She is without complaint today  ROS: Negative except as noted in HPI    Patient's last menstrual period was 02/22/2023  Menstrual History  Period Cycle (Days): 28  Period Duration (Days): 4  Period Pattern: Regular  Menstrual Flow: Moderate  Menstrual Control: Tampon, Maxi pad  Menstrual Control Change Freq (Hours): 4  Dysmenorrhea: None    She  reports being sexually active and has had partner(s) who are male   She reports using the following method of birth control/protection: I U D   Sexual History  Sexual Assault: No  Sexually Transmitted Infection History: None  Multiple Sex Partners: No  Is Patient in a Monogamous Relationship?: Yes    The following portions of the patient's history were reviewed and updated as appropriate:   Past Medical History:   Diagnosis Date   • Chronic fatigue    • Depression     currently on Zoloft 50 mg managed by PCP  Past Surgical History:   Procedure Laterality Date   • [place]  DELIVERY ONLY N/A 10/25/2022    Procedure:  SECTION (); Surgeon: Megan Bassett MD;  Location: [facility];  Service: Obstetrics   • TONSILECTOMY AND ADNOIDECTOMY     • WISDOM TOOTH EXTRACTION       Family History   Problem Relation Age of Onset   • Thyroid disease Mother    • Diabetes type II Father    • Diabetes type II Maternal Grandmother    • Lung cancer Maternal Grandfather    • Diabetes type II Maternal Grandfather    • Breast cancer Neg Hx    • Ovarian cancer Neg Hx    • Colon cancer Neg Hx    • Down syndrome Neg Hx    • Fragile X syndrome Neg Hx    • Cystic fibrosis Neg Hx    • Uterine cancer Neg Hx      Social History     Socioeconomic History   • Marital status: /Civil Union     Spouse name: None   • Number of children: None   • Years of education: None   • Highest education level: None   Occupational History   • Occupation: LPN    Tobacco Use   • Smoking status: Former     Packs/day: 0 50     Years: 10 00     Pack years: 5 00     Types: Cigarettes     Quit date: 2019     Years since quitting: 3 8   • Smokeless tobacco: Never   Vaping Use   • Vaping Use: Never used   Substance and Sexual Activity   • Alcohol use: Yes     Alcohol/week: 2 0 standard drinks     Types: 2 Standard drinks or equivalent per week     Comment: socially   • Drug use: Never   • Sexual activity: Yes     Partners: Male     Birth control/protection: I U D       Comment: paragard IUD   Other Topics Concern   • None   Social History Narrative   • None     Social Determinants of Health     Financial Resource Strain: Not on file   Food Insecurity: Not on file   Transportation Needs: Not on file   Physical Activity: Not on file   Stress: Not on file   Social Connections: Not on file   Intimate Partner Violence: Not on file   Housing Stability: Not on file     Outpatient Medications Marked as Taking for the 3/8/23 encounter (Annual Exam) with Chandler Luu MD   Medication   • intrauterine copper [facility]) IUD   • Prenatal MV-Min-Fe Fum-FA-DHA (PRENATAL 1 PO)   • sertraline (ZOLOFT) 25 mg tablet   • [DISCONTINUED] sertraline (ZOLOFT) 25 mg tablet     No Known Allergies        Objective:  /78 (BP Location: Left arm, Patient Position: Sitting, Cuff Size: Standard)   Ht 5' 3" (1 6 m)   Wt 89 9 kg (198 lb 3 2 oz)   LMP 02/22/2023   Breastfeeding No   BMI 35 11 kg/m²        Chaperone present? Yes: Milagro Thurman MA  General Appearance: alert and oriented, in no acute distress  Neck/Thyroid: No thyromegaly, no thyroid nodules  Respiratory: Unlabored breathing  Cardiovascular: Regular rate, no peripheral edema  Abdomen: Soft, non-tender, non-distended, no masses, no rebound or guarding  Breast Exam: No dimpling, nipple retraction or discharge  No lumps or masses  No axillary or supraclavicular nodes  Pelvic:       External genitalia: Normal appearance, no abnormal pigmentation, no lesions or masses  Normal Bartholin's and [place]'s  Urinary system: Urethral meatus normal, bladder non-tender  Vaginal: normal mucosa without prolapse or lesions  Normal-appearing physiologic discharge  Cervix: Normal-appearing, well-epithelialized, no gross lesions or masses  No cervical motion tenderness  IUD strings visualized  Adnexa: No adnexal masses or tenderness noted  Uterus: Normal-sized, regular contour, midline, mobile, no uterine tenderness  Extremities: Normal range of motion  Warm, well-perfused, non-tender     Skin: normal, no rash or abnormalities  Neurologic: alert, oriented x3  Psychiatric: Appropriate affect, mood stable, cooperative with exam         Raquel Troncoso MD  3/8/2023 6:30 PM

## 2023-05-08 ENCOUNTER — OFFICE VISIT (OUTPATIENT)
Dept: URGENT CARE | Facility: CLINIC | Age: 33
End: 2023-05-08

## 2023-05-08 VITALS
OXYGEN SATURATION: 97 % | HEIGHT: 63 IN | DIASTOLIC BLOOD PRESSURE: 72 MMHG | TEMPERATURE: 101.9 F | RESPIRATION RATE: 16 BRPM | SYSTOLIC BLOOD PRESSURE: 120 MMHG | WEIGHT: 198 LBS | BODY MASS INDEX: 35.08 KG/M2 | HEART RATE: 131 BPM

## 2023-05-08 DIAGNOSIS — J02.0 STREP PHARYNGITIS: Primary | ICD-10-CM

## 2023-05-08 DIAGNOSIS — J02.9 SORE THROAT: ICD-10-CM

## 2023-05-08 LAB — S PYO AG THROAT QL: POSITIVE

## 2023-05-08 RX ORDER — AMOXICILLIN 500 MG/1
500 CAPSULE ORAL EVERY 12 HOURS SCHEDULED
Qty: 20 CAPSULE | Refills: 0 | Status: SHIPPED | OUTPATIENT
Start: 2023-05-08 | End: 2023-05-16 | Stop reason: ALTCHOICE

## 2023-05-08 NOTE — LETTER
May 8, 2023     Patient: Admire Space   YOB: 1990   Date of Visit: 5/8/2023       To Whom It May Concern: It is my medical opinion that James Gutierrez may return to work on 5/10/23  If you have any questions or concerns, please don't hesitate to call           Sincerely,        Freedom Pyle PA-C    CC: No Recipients

## 2023-05-08 NOTE — PATIENT INSTRUCTIONS
Your rapid strep test was positive  Take antibiotics as directed  Change your toothbrush after 48 hours of being on antibiotics  For sore throat you can use Cepacol lozenges, do warm salt water gargles, drink warm water with lemon or herbal teas, or use an over-the-counter throat spray (Chloraseptic)  Follow up with your PCP in 3-5 days if symptoms persist     Go to the ER if symptoms significantly worsen

## 2023-05-08 NOTE — PROGRESS NOTES
[facility]        NAME: Vic Davalos is a 28 y o  female  : 1990    MRN: [id number]  DATE: May 8, 2023  TIME: 4:05 PM    Assessment and Plan   Strep pharyngitis [J02 0]  1  Strep pharyngitis        2  Sore throat  POCT rapid strepA            Patient Instructions     Your rapid strep test was positive  Take antibiotics as directed  Change your toothbrush after 48 hours of being on antibiotics  For sore throat you can use Cepacol lozenges, do warm salt water gargles, drink warm water with lemon or herbal teas, or use an over-the-counter throat spray (Chloraseptic)  Follow up with your PCP in 3-5 days if symptoms persist     Go to the ER if symptoms significantly worsen  Follow up with PCP in 3-5 days  Proceed to  ER if symptoms worsen  Chief Complaint     Chief Complaint   Patient presents with   • Fever     Pt reports onset of fever yesterday 102 0 F  C/o body aches and sore throat  States temp this morning of 103 0 F  Managing symptoms with alternating Ibuprofen and Tylenol  Last dose 14:00 of Tylenol  History of Present Illness       Sore Throat   This is a new problem  The current episode started yesterday  The problem has been rapidly worsening  The maximum temperature recorded prior to her arrival was 101 - 101 9 F  Associated symptoms include headaches  Pertinent negatives include no abdominal pain, congestion, coughing, ear pain, shortness of breath or vomiting  She has had no exposure to strep  She has tried nothing for the symptoms  Review of Systems   Review of Systems   Constitutional: Negative for chills and fever  HENT: Positive for sore throat  Negative for congestion and ear pain  Eyes: Negative for pain and visual disturbance  Respiratory: Negative for cough and shortness of breath  Cardiovascular: Negative for chest pain and palpitations  Gastrointestinal: Negative for abdominal pain and vomiting     Genitourinary: Negative for dysuria and hematuria  Musculoskeletal: Negative for arthralgias and back pain  Skin: Negative for color change and rash  Neurological: Positive for headaches  Negative for seizures and syncope  All other systems reviewed and are negative  Current Medications       Current Outpatient Medications:   •  intrauterine copper (PARAGARD) IUD, 1 each by Intrauterine route once, Disp: , Rfl:   •  Prenatal MV-Min-Fe Fum-FA-DHA (PRENATAL 1 PO), Take 1 tablet by mouth daily (Patient not taking: Reported on 2023), Disp: , Rfl:   •  sertraline (ZOLOFT) 25 mg tablet, Take 3 tablets (75 mg total) by mouth daily (Patient not taking: Reported on 2023), Disp: 270 tablet, Rfl: 4    Current Allergies     Allergies as of 2023   • (No Known Allergies)            The following portions of the patient's history were reviewed and updated as appropriate: allergies, current medications, past family history, past medical history, past social history, past surgical history and problem list      Past Medical History:   Diagnosis Date   • Chronic fatigue    • Depression     currently on Zoloft 50 mg managed by PCP  Past Surgical History:   Procedure Laterality Date   • [place]  DELIVERY ONLY N/A 10/25/2022    Procedure:  SECTION (); Surgeon: Angel Jordan MD;  Location: [facility];  Service: Obstetrics   • TONSILECTOMY AND ADNOIDECTOMY     • WISDOM TOOTH EXTRACTION  2019       Family History   Problem Relation Age of Onset   • Thyroid disease Mother    • Diabetes type II Father    • Diabetes type II Maternal Grandmother    • Lung cancer Maternal Grandfather    • Diabetes type II Maternal Grandfather    • Breast cancer Neg Hx    • Ovarian cancer Neg Hx    • Colon cancer Neg Hx    • Down syndrome Neg Hx    • Fragile X syndrome Neg Hx    • Cystic fibrosis Neg Hx    • Uterine cancer Neg Hx          Medications have been verified          Objective   /72 (BP Location: Left arm, Patient "Position: Sitting)   Pulse (!) 131   Temp (!) 101 9 °F (38 8 °C)   Resp 16   Ht 5' 3\" (1 6 m)   Wt 89 8 kg (198 lb)   SpO2 97%   Breastfeeding No   BMI 35 07 kg/m²   No LMP recorded  Physical Exam     Physical Exam  Vitals and nursing note reviewed  Constitutional:       General: She is not in acute distress  Appearance: Normal appearance  HENT:      Head: Normocephalic and atraumatic  Right Ear: Tympanic membrane and ear canal normal       Left Ear: Tympanic membrane and ear canal normal       Nose: Nose normal       Mouth/Throat:      Mouth: Mucous membranes are moist       Pharynx: Posterior oropharyngeal erythema present  Eyes:      Conjunctiva/sclera: Conjunctivae normal    Cardiovascular:      Rate and Rhythm: Normal rate and regular rhythm  Pulses: Normal pulses  Heart sounds: Normal heart sounds  Pulmonary:      Effort: Pulmonary effort is normal       Breath sounds: Normal breath sounds  Lymphadenopathy:      Cervical: No cervical adenopathy  Skin:     General: Skin is warm and dry  Neurological:      Mental Status: She is alert and oriented to person, place, and time     Psychiatric:         Mood and Affect: Mood normal          Behavior: Behavior normal      Rapid strep positive               "

## 2023-05-16 ENCOUNTER — OFFICE VISIT (OUTPATIENT)
Dept: FAMILY MEDICINE CLINIC | Facility: CLINIC | Age: 33
End: 2023-05-16

## 2023-05-16 VITALS
RESPIRATION RATE: 17 BRPM | BODY MASS INDEX: 36.29 KG/M2 | HEIGHT: 63 IN | OXYGEN SATURATION: 99 % | WEIGHT: 204.8 LBS | TEMPERATURE: 97.8 F | HEART RATE: 79 BPM | DIASTOLIC BLOOD PRESSURE: 82 MMHG | SYSTOLIC BLOOD PRESSURE: 126 MMHG

## 2023-05-16 DIAGNOSIS — L02.219 CELLULITIS AND ABSCESS OF TRUNK: Primary | ICD-10-CM

## 2023-05-16 DIAGNOSIS — L03.319 CELLULITIS AND ABSCESS OF TRUNK: Primary | ICD-10-CM

## 2023-05-16 RX ORDER — CEPHALEXIN 500 MG/1
500 CAPSULE ORAL 2 TIMES DAILY
Qty: 14 CAPSULE | Refills: 0 | Status: SHIPPED | OUTPATIENT
Start: 2023-05-16 | End: 2023-05-23

## 2023-05-16 NOTE — PROGRESS NOTES
Dionne Kent 1990 female MRN: [id number]    Family Medicine Acute Visit    ASSESSMENT/PLAN  Problem List Items Addressed This Visit    None  Visit Diagnoses     Cellulitis and abscess of trunk    -  Primary    Relevant Medications    cephalexin (KEFLEX) 500 mg capsule    Other Relevant Orders    Ambulatory Referral to Wound Care                Future Appointments   Date Time Provider Lorenzo Cunha   2023  7:30 AM Glenn Eisenmenger, DO [facility]          SUBJECTIVE  CC: Skin Problem (Pt reports wound to pelvic area, believes it started as ingrown hair, abscessed and opened  /On amox for strep throat - one day left )      HPI:  Dionne Kent is a 28 y o  female who presents for skin lesion  A few weeks ago thought she had a ingrown hair in her groin area  It got bigger and one day it opened and drained pus  She was using neosporin and band aid  Review of Systems   Constitutional: Negative for chills, fatigue and fever  HENT: Negative for congestion, postnasal drip, rhinorrhea and sinus pressure  Eyes: Negative for photophobia and visual disturbance  Respiratory: Negative for cough and shortness of breath  Cardiovascular: Negative for chest pain, palpitations and leg swelling  Gastrointestinal: Negative for abdominal pain, constipation, diarrhea, nausea and vomiting  Genitourinary: Negative for difficulty urinating and dysuria  Musculoskeletal: Negative for arthralgias and myalgias  Skin: Positive for wound  Negative for color change and rash  Neurological: Negative for dizziness, weakness, light-headedness and headaches  Historical Information   The patient history was reviewed as follows:  Past Medical History:   Diagnosis Date   • Chronic fatigue    • Depression     currently on Zoloft 50 mg managed by PCP            Past Surgical History:   Procedure Laterality Date   • [place]  DELIVERY ONLY N/A 10/25/2022    Procedure:  SECTION "();   Surgeon: Vipul Heredia MD;  Location: [facility];  Service: Obstetrics   • TONSILECTOMY AND ADNOIDECTOMY     • WISDOM TOOTH EXTRACTION       Family History   Problem Relation Age of Onset   • Thyroid disease Mother    • Diabetes type II Father    • Diabetes type II Maternal Grandmother    • Lung cancer Maternal Grandfather    • Diabetes type II Maternal Grandfather    • Breast cancer Neg Hx    • Ovarian cancer Neg Hx    • Colon cancer Neg Hx    • Down syndrome Neg Hx    • Fragile X syndrome Neg Hx    • Cystic fibrosis Neg Hx    • Uterine cancer Neg Hx       Social History   Social History     Substance and Sexual Activity   Alcohol Use Yes   • Alcohol/week: 2 0 standard drinks   • Types: 2 Standard drinks or equivalent per week    Comment: socially     Social History     Substance and Sexual Activity   Drug Use Never     Social History     Tobacco Use   Smoking Status Former   • Packs/day: 0 50   • Years: 10 00   • Pack years: 5 00   • Types: Cigarettes   • Quit date: 2019   • Years since quittin 0   Smokeless Tobacco Never       Medications:     Current Outpatient Medications:   •  cephalexin (KEFLEX) 500 mg capsule, Take 1 capsule (500 mg total) by mouth 2 (two) times a day for 7 days, Disp: 14 capsule, Rfl: 0  •  intrauterine copper (PARAGARD) IUD, 1 each by Intrauterine route once, Disp: , Rfl:   •  Prenatal MV-Min-Fe Fum-FA-DHA (PRENATAL 1 PO), Take 1 tablet by mouth daily (Patient not taking: Reported on 2023), Disp: , Rfl:   •  sertraline (ZOLOFT) 25 mg tablet, Take 3 tablets (75 mg total) by mouth daily (Patient not taking: Reported on 2023), Disp: 270 tablet, Rfl: 4    No Known Allergies    OBJECTIVE  Vitals:   Vitals:    23 1505   BP: 126/82   BP Location: Left arm   Patient Position: Sitting   Cuff Size: Large   Pulse: 79   Resp: 17   Temp: 97 8 °F (36 6 °C)   TempSrc: Tympanic   SpO2: 99%   Weight: 92 9 kg (204 lb 12 8 oz)   Height: 5' 3\" (1 6 m)         Physical " Exam  Constitutional:       General: She is not in acute distress  Appearance: Normal appearance  She is not ill-appearing, toxic-appearing or diaphoretic  HENT:      Head: Normocephalic and atraumatic  Eyes:      Extraocular Movements: Extraocular movements intact  Conjunctiva/sclera: Conjunctivae normal    Pulmonary:      Effort: Pulmonary effort is normal  No respiratory distress  Skin:            Comments: Small erythematous lesion, no fluctuance noted at this time   Neurological:      General: No focal deficit present  Mental Status: She is alert and oriented to person, place, and time     Psychiatric:         Mood and Affect: Mood normal          Behavior: Behavior normal                     Perry Rodriguez,     5/16/2023

## 2023-07-31 DIAGNOSIS — F41.1 GAD (GENERALIZED ANXIETY DISORDER): ICD-10-CM

## 2023-07-31 DIAGNOSIS — R53.82 CHRONIC FATIGUE: ICD-10-CM

## 2023-07-31 DIAGNOSIS — R73.01 IFG (IMPAIRED FASTING GLUCOSE): ICD-10-CM

## 2023-07-31 DIAGNOSIS — F32.0 CURRENT MILD EPISODE OF MAJOR DEPRESSIVE DISORDER WITHOUT PRIOR EPISODE (HCC): Primary | ICD-10-CM

## 2023-07-31 DIAGNOSIS — Z00.00 ANNUAL PHYSICAL EXAM: ICD-10-CM

## 2023-08-18 LAB
ALBUMIN SERPL-MCNC: 4.4 G/DL (ref 3.9–4.9)
ALBUMIN/GLOB SERPL: 2 {RATIO} (ref 1.2–2.2)
ALP SERPL-CCNC: 142 IU/L (ref 44–121)
ALT SERPL-CCNC: 12 IU/L (ref 0–32)
APPEARANCE UR: CLEAR
AST SERPL-CCNC: 13 IU/L (ref 0–40)
BACTERIA URNS QL MICRO: NORMAL
BASOPHILS # BLD AUTO: 0 X10E3/UL (ref 0–0.2)
BASOPHILS NFR BLD AUTO: 0 %
BILIRUB SERPL-MCNC: 0.2 MG/DL (ref 0–1.2)
BILIRUB UR QL STRIP: NEGATIVE
BUN SERPL-MCNC: 9 MG/DL (ref 6–20)
BUN/CREAT SERPL: 13 (ref 9–23)
CALCIUM SERPL-MCNC: 9.4 MG/DL (ref 8.7–10.2)
CASTS URNS QL MICRO: NORMAL /LPF
CHLORIDE SERPL-SCNC: 102 MMOL/L (ref 96–106)
CHOLEST SERPL-MCNC: 152 MG/DL (ref 100–199)
CHOLEST/HDLC SERPL: 2.6 RATIO (ref 0–4.4)
CO2 SERPL-SCNC: 22 MMOL/L (ref 20–29)
COLOR UR: YELLOW
CREAT SERPL-MCNC: 0.68 MG/DL (ref 0.57–1)
EGFR: 117 ML/MIN/1.73
EOSINOPHIL # BLD AUTO: 0 X10E3/UL (ref 0–0.4)
EOSINOPHIL NFR BLD AUTO: 0 %
EPI CELLS #/AREA URNS HPF: NORMAL /HPF (ref 0–10)
ERYTHROCYTE [DISTWIDTH] IN BLOOD BY AUTOMATED COUNT: 13.3 % (ref 11.7–15.4)
EST. AVERAGE GLUCOSE BLD GHB EST-MCNC: 151 MG/DL
GLOBULIN SER-MCNC: 2.2 G/DL (ref 1.5–4.5)
GLUCOSE SERPL-MCNC: 150 MG/DL (ref 70–99)
GLUCOSE UR QL: NEGATIVE
HBA1C MFR BLD: 6.9 % (ref 4.8–5.6)
HCT VFR BLD AUTO: 37.2 % (ref 34–46.6)
HDLC SERPL-MCNC: 59 MG/DL
HGB BLD-MCNC: 12.6 G/DL (ref 11.1–15.9)
HGB UR QL STRIP: NEGATIVE
IMM GRANULOCYTES # BLD: 0 X10E3/UL (ref 0–0.1)
IMM GRANULOCYTES NFR BLD: 0 %
KETONES UR QL STRIP: NEGATIVE
LDLC SERPL CALC-MCNC: 78 MG/DL (ref 0–99)
LEUKOCYTE ESTERASE UR QL STRIP: ABNORMAL
LYMPHOCYTES # BLD AUTO: 2.2 X10E3/UL (ref 0.7–3.1)
LYMPHOCYTES NFR BLD AUTO: 26 %
MCH RBC QN AUTO: 32.1 PG (ref 26.6–33)
MCHC RBC AUTO-ENTMCNC: 33.9 G/DL (ref 31.5–35.7)
MCV RBC AUTO: 95 FL (ref 79–97)
MICRO URNS: ABNORMAL
MONOCYTES # BLD AUTO: 0.9 X10E3/UL (ref 0.1–0.9)
MONOCYTES NFR BLD AUTO: 10 %
NEUTROPHILS # BLD AUTO: 5.4 X10E3/UL (ref 1.4–7)
NEUTROPHILS NFR BLD AUTO: 64 %
NITRITE UR QL STRIP: NEGATIVE
PH UR STRIP: 6.5 [PH] (ref 5–7.5)
PLATELET # BLD AUTO: 245 X10E3/UL (ref 150–450)
POTASSIUM SERPL-SCNC: 4.2 MMOL/L (ref 3.5–5.2)
PROT SERPL-MCNC: 6.6 G/DL (ref 6–8.5)
PROT UR QL STRIP: NEGATIVE
RBC # BLD AUTO: 3.92 X10E6/UL (ref 3.77–5.28)
RBC #/AREA URNS HPF: NORMAL /HPF (ref 0–2)
SL AMB VLDL CHOLESTEROL CALC: 15 MG/DL (ref 5–40)
SODIUM SERPL-SCNC: 138 MMOL/L (ref 134–144)
SP GR UR: 1.01 (ref 1–1.03)
TRIGL SERPL-MCNC: 81 MG/DL (ref 0–149)
TSH SERPL DL<=0.005 MIU/L-ACNC: 0.65 UIU/ML (ref 0.45–4.5)
UROBILINOGEN UR STRIP-ACNC: 0.2 MG/DL (ref 0.2–1)
WBC # BLD AUTO: 8.5 X10E3/UL (ref 3.4–10.8)
WBC #/AREA URNS HPF: NORMAL /HPF (ref 0–5)

## 2023-08-18 NOTE — PROGRESS NOTES
[facility]  coding opportunities       Chart reviewed, no opportunity found: CHART REVIEWED, NO OPPORTUNITY FOUND        Patients Insurance        Commercial Insurance: [employer] Physical Therapy Visit    Visit Type: Daily Treatment Note  Visit: 4  Referring Provider: Sonia Waterman PA-C  Medical Diagnosis (from order): Diagnosis Information    Diagnosis  716.95 (ICD-9-CM) - M16.11 (ICD-10-CM) - Arthritis of right hip  726.5 (ICD-9-CM) - M70.61 (ICD-10-CM) - Trochanteric bursitis of right hip         SUBJECTIVE                                                                                                               She slept on her right side last night and she had pain with that. She tends to feel good for the rest of the day after her appointments.     Pain / Symptoms  - Pain rating (out of 10): Current: 5       OBJECTIVE                                                                                                                                       Treatment    Ultrasound (76427)  - Location: Right hip  - Position: sidelying Hydrocortisone  - Duty Cycle: 50%  - Frequency: 1 Mhz  - Intensity (w/cm2): 1.0  - Duration: 8 minutes    Results: decreased pain  Reaction: no adverse reaction to treatment      Therapeutic Exercise  Bridging 2 x10    Manual Therapy   Soft/deep tissue and cross friction mobilization to left hip rotators in side lying    Skilled input: verbal instruction/cues and tactile instruction/cues    Writer verbally educated and received verbal consent for hand placement, positioning of patient, and techniques to be performed today from patient for clothing adjustments for techniques, hand placement and palpation for techniques and therapist position for techniques as described above and how they are pertinent to the patient's plan of care.  Home Exercise Program  - Chilango  - 1 x daily - 10 reps  - Supine Piriformis Stretch with Leg Straight  - 2 x daily - 2 reps - 30 seconds hold  - Bridging 2 x10      ASSESSMENT                                                                                                            She has weakness through her glutes which  puts stress on her small rotators and causes pain.        Therapy procedure time and total treatment time can be found documented on the Time Entry flowsheet

## 2023-08-29 ENCOUNTER — OFFICE VISIT (OUTPATIENT)
Dept: FAMILY MEDICINE CLINIC | Facility: CLINIC | Age: 33
End: 2023-08-29
Payer: COMMERCIAL

## 2023-08-29 VITALS
DIASTOLIC BLOOD PRESSURE: 86 MMHG | SYSTOLIC BLOOD PRESSURE: 128 MMHG | WEIGHT: 213.4 LBS | HEIGHT: 63 IN | RESPIRATION RATE: 16 BRPM | TEMPERATURE: 97.7 F | HEART RATE: 101 BPM | BODY MASS INDEX: 37.81 KG/M2 | OXYGEN SATURATION: 99 %

## 2023-08-29 DIAGNOSIS — F41.1 GAD (GENERALIZED ANXIETY DISORDER): ICD-10-CM

## 2023-08-29 DIAGNOSIS — Z00.00 ANNUAL PHYSICAL EXAM: Primary | ICD-10-CM

## 2023-08-29 DIAGNOSIS — F32.0 CURRENT MILD EPISODE OF MAJOR DEPRESSIVE DISORDER WITHOUT PRIOR EPISODE (HCC): ICD-10-CM

## 2023-08-29 DIAGNOSIS — E11.65 TYPE 2 DIABETES MELLITUS WITH HYPERGLYCEMIA, WITHOUT LONG-TERM CURRENT USE OF INSULIN (HCC): ICD-10-CM

## 2023-08-29 PROCEDURE — 99395 PREV VISIT EST AGE 18-39: CPT | Performed by: FAMILY MEDICINE

## 2023-08-29 PROCEDURE — 99214 OFFICE O/P EST MOD 30 MIN: CPT | Performed by: FAMILY MEDICINE

## 2023-08-29 RX ORDER — ESCITALOPRAM OXALATE 5 MG/1
5 TABLET ORAL DAILY
Qty: 30 TABLET | Refills: 5 | Status: SHIPPED | OUTPATIENT
Start: 2023-08-29

## 2023-08-29 NOTE — ASSESSMENT & PLAN NOTE
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 Will call me sooner if any issues.

## 2023-08-29 NOTE — PROGRESS NOTES
[address] [facility]    NAME: Geo Evans  AGE: 28 y.o. SEX: female  : 1990     DATE: 2023     Assessment and Plan:     Problem List Items Addressed This Visit        Endocrine    Type 2 diabetes mellitus with hyperglycemia, without long-term current use of insulin ([address])       Lab Results   Component Value Date    HGBA1C 6.9 (H) 2023       Patient does not want to use medication at this time she knows her diet has been poor lately and she wants to work on this first. Referral for diabetic education. Labs in 3 months. If not improved at that time will start medication. Relevant Orders    Ambulatory referral to Diabetic Education - use to refer for diabetes group classes, individual diabetes education, medical nutrition therapy, device training    Lipid Panel with Direct LDL reflex    Albumin / creatinine urine ratio    Comprehensive metabolic panel    Hemoglobin A1C       Other    [place] (generalized anxiety disorder)    Relevant Medications    escitalopram (LEXAPRO) 5 mg tablet    BMI 37.0-37.9, adult     BMI Counseling: Body mass index is 37.8 kg/m². The BMI is above normal. Nutrition recommendations include reducing portion sizes, decreasing overall calorie intake and 3-5 servings of fruits/vegetables daily. Exercise recommendations include vigorous aerobic physical activity for 75 minutes/week. 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 Will call me sooner if any issues.          Relevant Medications    escitalopram (LEXAPRO) 5 mg tablet   Other Visit Diagnoses     Annual physical exam    -  Primary          Immunizations and preventive care screenings were discussed with patient today. Appropriate education was printed on patient's after visit summary. Counseling:  Alcohol/drug use: discussed moderation in alcohol intake, the recommendations for healthy alcohol use, and avoidance of illicit drug use. Dental Health: discussed importance of regular tooth brushing, flossing, and dental visits. Injury prevention: discussed safety/seat belts, safety helmets, smoke detectors, carbon dioxide detectors, and smoking near bedding or upholstery. Sexual health: discussed sexually transmitted diseases, partner selection, use of condoms, avoidance of unintended pregnancy, and contraceptive alternatives. · Exercise: the importance of regular exercise/physical activity was discussed. Recommend exercise 3-5 times per week for at least 30 minutes. Return in about 3 months (around 11/29/2023) for diabetes check up. Chief Complaint:     Chief Complaint   Patient presents with   • Annual Exam      History of Present Illness:     Adult Annual Physical   Patient here for a comprehensive physical exam.     Diet and Physical Activity  · Diet/Nutrition: well balanced diet. · Exercise: moderate cardiovascular exercise. Depression Screening  PHQ-2/9 Depression Screening         /GYN Health  · Follows with gyn, iud in place     Review of Systems:     Review of Systems   Constitutional: Negative for chills, fatigue and fever. HENT: Negative for congestion, postnasal drip, rhinorrhea and sinus pressure. Eyes: Negative for photophobia and visual disturbance. Respiratory: Negative for cough and shortness of breath. Cardiovascular: Negative for chest pain, palpitations and leg swelling. Gastrointestinal: Negative for abdominal pain, constipation, diarrhea, nausea and vomiting. Genitourinary: Negative for difficulty urinating and dysuria. Musculoskeletal: Negative for arthralgias and myalgias.    Skin: Negative for color change and rash. Neurological: Negative for dizziness, weakness, light-headedness and headaches. Past Medical History:     Past Medical History:   Diagnosis Date   • Chronic fatigue    • Depression     currently on Zoloft 50 mg managed by PCP. Past Surgical History:     Past Surgical History:   Procedure Laterality Date   • [place]  DELIVERY ONLY N/A 10/25/2022    Procedure:  SECTION (); Surgeon: Azul Hennessy MD;  Location: [facility];  Service: Obstetrics   • TONSILECTOMY AND ADNOIDECTOMY     • WISDOM TOOTH EXTRACTION        Social History:     Social History     Socioeconomic History   • Marital status: /Civil Union     Spouse name: None   • Number of children: None   • Years of education: None   • Highest education level: None   Occupational History   • Occupation: LPN    Tobacco Use   • Smoking status: Former     Packs/day: 0.50     Years: 10.00     Total pack years: 5.00     Types: Cigarettes     Quit date: 2019     Years since quittin.3   • Smokeless tobacco: Never   Vaping Use   • Vaping Use: Never used   Substance and Sexual Activity   • Alcohol use: Yes     Alcohol/week: 2.0 standard drinks of alcohol     Types: 2 Standard drinks or equivalent per week     Comment: socially   • Drug use: Never   • Sexual activity: Yes     Partners: Male     Birth control/protection: I.U.D.      Comment: paragard IUD   Other Topics Concern   • None   Social History Narrative   • None     Social Determinants of Health     Financial Resource Strain: Not on file   Food Insecurity: Not on file   Transportation Needs: Not on file   Physical Activity: Inactive (2021)    Exercise Vital Sign    • Days of Exercise per Week: 0 days    • Minutes of Exercise per Session: 0 min   Stress: Stress Concern Present (2021)    [address] [facility]    • Feeling of Stress : Very much   Social Connections: Not on file   Intimate Partner Violence: Not At Risk (12/28/2021)    Humiliation, Afraid, Rape, and Kick questionnaire    • Fear of Current or Ex-Partner: No    • Emotionally Abused: No    • Physically Abused: No    • Sexually Abused: No   Housing Stability: Not on file      Family History:     Family History   Problem Relation Age of Onset   • Thyroid disease Mother    • Diabetes type II Father    • Diabetes type II Maternal Grandmother    • Lung cancer Maternal Grandfather    • Diabetes type II Maternal Grandfather    • Breast cancer Neg Hx    • Ovarian cancer Neg Hx    • Colon cancer Neg Hx    • Down syndrome Neg Hx    • Fragile X syndrome Neg Hx    • Cystic fibrosis Neg Hx    • Uterine cancer Neg Hx       Current Medications:     Current Outpatient Medications   Medication Sig Dispense Refill   • escitalopram (LEXAPRO) 5 mg tablet Take 1 tablet (5 mg total) by mouth daily 30 tablet 5   • intrauterine copper (PARAGARD) IUD 1 each by Intrauterine route once (Patient not taking: Reported on 8/29/2023)     • Prenatal MV-Min-Fe Fum-FA-DHA (PRENATAL 1 PO) Take 1 tablet by mouth daily (Patient not taking: Reported on 5/8/2023)       No current facility-administered medications for this visit. Allergies:     No Known Allergies   Physical Exam:     /86 (BP Location: Left arm, Patient Position: Sitting, Cuff Size: Large)   Pulse 101   Temp 97.7 °F (36.5 °C) (Tympanic)   Resp 16   Ht 5' 3" (1.6 m)   Wt 96.8 kg (213 lb 6.4 oz)   SpO2 99%   BMI 37.80 kg/m²     Physical Exam  Constitutional:       General: She is not in acute distress. Appearance: Normal appearance. She is not ill-appearing, toxic-appearing or diaphoretic. HENT:      Head: Normocephalic and atraumatic. Right Ear: Tympanic membrane and ear canal normal.      Left Ear: Tympanic membrane and ear canal normal.      Nose: Nose normal. No congestion. Mouth/Throat:      Mouth: Mucous membranes are moist.      Pharynx: Oropharynx is clear.  No oropharyngeal exudate. Eyes:      Extraocular Movements: Extraocular movements intact. Conjunctiva/sclera: Conjunctivae normal.      Pupils: Pupils are equal, round, and reactive to light. Cardiovascular:      Rate and Rhythm: Normal rate and regular rhythm. Pulses: Normal pulses. no weak pulses     Heart sounds: No murmur heard. Pulmonary:      Effort: Pulmonary effort is normal.      Breath sounds: Normal breath sounds. No wheezing, rhonchi or rales. Abdominal:      General: Bowel sounds are normal. There is no distension. Palpations: Abdomen is soft. Tenderness: There is no abdominal tenderness. Musculoskeletal:         General: No swelling or tenderness. Normal range of motion. Cervical back: Normal range of motion and neck supple. Feet:      Right foot:      Skin integrity: No ulcer, skin breakdown, erythema, warmth, callus or dry skin. Left foot:      Skin integrity: No ulcer, skin breakdown, erythema, warmth, callus or dry skin. Skin:     General: Skin is warm and dry. Capillary Refill: Capillary refill takes less than 2 seconds. Neurological:      General: No focal deficit present. Mental Status: She is alert and oriented to person, place, and time. Cranial Nerves: No cranial nerve deficit. Psychiatric:         Mood and Affect: Mood normal.         Behavior: Behavior normal.         Thought Content: Thought content normal.        Diabetic Foot Exam    Patient's shoes and socks removed. Right Foot/Ankle   Right Foot Inspection  Skin Exam: skin normal and skin intact. No dry skin, no warmth, no callus, no erythema, no maceration, no abnormal color, no pre-ulcer, no ulcer and no callus. Toe Exam: ROM and strength within normal limits. Sensory   Monofilament testing: intact    Vascular  Capillary refills: < 3 seconds          Left Foot/Ankle  Left Foot Inspection  Skin Exam: skin normal and skin intact.  No dry skin, no warmth, no erythema, no maceration, normal color, no pre-ulcer, no ulcer and no callus. Toe Exam: ROM and strength within normal limits.      Sensory   Monofilament testing: intact    Vascular  Capillary refills: < 3 seconds          Assign Risk Category  No deformity present  No loss of protective sensation  No weak pulses  Risk: 0    Michael Smalls DO   [facility]

## 2023-08-29 NOTE — PATIENT INSTRUCTIONS
Wellness Visit for Adults   AMBULATORY CARE:   A wellness visit  is when you see your healthcare provider to get screened for health problems. Your healthcare provider will also give you advice on how to stay healthy. Write down your questions so you remember to ask them. Ask your healthcare provider how often you should have a wellness visit. What happens at a wellness visit:  Your healthcare provider will ask about your health, and your family history of health problems. This includes high blood pressure, heart disease, and cancer. He or she will ask if you have symptoms that concern you, if you smoke, and about your mood. You may also be asked about your intake of medicines, supplements, food, and alcohol. Any of the following may be done:  • Your weight  will be checked. Your height may also be checked so your body mass index (BMI) can be calculated. Your BMI shows if you are at a healthy weight. • Your blood pressure  and heart rate will be checked. Your temperature may also be checked. • Blood and urine tests  may be done. Blood tests may be done to check your cholesterol levels. Abnormal cholesterol levels increase your risk for heart disease and stroke. You may also need a blood or urine test to check for diabetes if you are at increased risk. Urine tests may be done to look for signs of an infection or kidney disease. • A physical exam  includes checking your heartbeat and lungs with a stethoscope. Your healthcare provider may also check your skin to look for sun damage. • Screening tests  may be recommended. A screening test is done to check for diseases that may not cause symptoms. The screening tests you may need depend on your age, gender, family history, and lifestyle habits. For example, colorectal screening may be recommended if you are 48years old or older. Screening tests you need if you are a woman:   • A Pap smear  is used to screen for cervical cancer.  Pap smears are usually done every 3 to 5 years depending on your age. You may need them more often if you have had abnormal Pap smear test results in the past. Ask your healthcare provider how often you should have a Pap smear. • A mammogram  is an x-ray of your breasts to screen for breast cancer. Experts recommend mammograms every 2 years starting at age 48 years. You may need a mammogram at age 52 years or younger if you have an increased risk for breast cancer. Talk to your healthcare provider about when you should start having mammograms and how often you need them. Vaccines you may need:   • Get an influenza vaccine  every year. The influenza vaccine protects you from the flu. Several types of viruses cause the flu. The viruses change over time, so new vaccines are made each year. • Get a tetanus-diphtheria (Td) booster vaccine  every 10 years. This vaccine protects you against tetanus and diphtheria. Tetanus is a severe infection that may cause painful muscle spasms and lockjaw. Diphtheria is a severe bacterial infection that causes a thick covering in the back of your mouth and throat. • Get a human papillomavirus (HPV) vaccine  if you are female and aged 23 to 32 or male 23 to 24 and never received it. This vaccine protects you from HPV infection. HPV is the most common infection spread by sexual contact. HPV may also cause vaginal, penile, and anal cancers. • Get a pneumococcal vaccine  if you are aged 72 years or older. The pneumococcal vaccine is an injection given to protect you from pneumococcal disease. Pneumococcal disease is an infection caused by pneumococcal bacteria. The infection may cause pneumonia, meningitis, or an ear infection. • Get a shingles vaccine  if you are 60 or older, even if you have had shingles before. The shingles vaccine is an injection to protect you from the varicella-zoster virus. This is the same virus that causes chickenpox.  Shingles is a painful rash that develops in people who had chickenpox or have been exposed to the virus. How to eat healthy:  My Plate is a model for planning healthy meals. It shows the types and amounts of foods that should go on your plate. Fruits and vegetables make up about half of your plate, and grains and protein make up the other half. A serving of dairy is included on the side of your plate. The amount of calories and serving sizes you need depends on your age, gender, weight, and height. Examples of healthy foods are listed below:  • Eat a variety of vegetables  such as dark green, red, and orange vegetables. You can also include canned vegetables low in sodium (salt) and frozen vegetables without added butter or sauces. • Eat a variety of fresh fruits , canned fruit in 100% juice, frozen fruit, and dried fruit. • Include whole grains. At least half of the grains you eat should be whole grains. Examples include whole-wheat bread, wheat pasta, brown rice, and whole-grain cereals such as oatmeal.    • Eat a variety of protein foods such as seafood (fish and shellfish), lean meat, and poultry without skin (turkey and chicken). Examples of lean meats include pork leg, shoulder, or tenderloin, and beef round, sirloin, tenderloin, and extra lean ground beef. Other protein foods include eggs and egg substitutes, beans, peas, soy products, nuts, and seeds. • Choose low-fat dairy products such as skim or 1% milk or low-fat yogurt, cheese, and cottage cheese. • Limit unhealthy fats  such as butter, hard margarine, and shortening. Exercise:  Exercise at least 30 minutes per day on most days of the week. Some examples of exercise include walking, biking, dancing, and swimming. You can also fit in more physical activity by taking the stairs instead of the elevator or parking farther away from stores. Include muscle strengthening activities 2 days each week. Regular exercise provides many health benefits.  It helps you manage your weight, and decreases your risk for type 2 diabetes, heart disease, stroke, and high blood pressure. Exercise can also help improve your mood. Ask your healthcare provider about the best exercise plan for you. General health and safety guidelines:   • Do not smoke. Nicotine and other chemicals in cigarettes and cigars can cause lung damage. Ask your healthcare provider for information if you currently smoke and need help to quit. E-cigarettes or smokeless tobacco still contain nicotine. Talk to your healthcare provider before you use these products. • Limit alcohol. A drink of alcohol is 12 ounces of beer, 5 ounces of wine, or 1½ ounces of liquor. • Lose weight, if needed. Being overweight increases your risk of certain health conditions. These include heart disease, high blood pressure, type 2 diabetes, and certain types of cancer. • Protect your skin. Do not sunbathe or use tanning beds. Use sunscreen with a SPF 15 or higher. Apply sunscreen at least 15 minutes before you go outside. Reapply sunscreen every 2 hours. Wear protective clothing, hats, and sunglasses when you are outside. • Drive safely. Always wear your seatbelt. Make sure everyone in your car wears a seatbelt. A seatbelt can save your life if you are in an accident. Do not use your cell phone when you are driving. This could distract you and cause an accident. Pull over if you need to make a call or send a text message. • Practice safe sex. Use latex condoms if are sexually active and have more than one partner. Your healthcare provider may recommend screening tests for sexually transmitted infections (STIs). • Wear helmets, lifejackets, and protective gear. Always wear a helmet when you ride a bike or motorcycle, go skiing, or play sports that could cause a head injury. Wear protective equipment when you play sports. Wear a lifejacket when you are on a boat or doing water sports.     © Copyright [employer] 2022 Information is for End User's use only and may not be sold, redistributed or otherwise used for commercial purposes. The above information is an  only. It is not intended as medical advice for individual conditions or treatments. Talk to your doctor, nurse or pharmacist before following any medical regimen to see if it is safe and effective for you. Type 2 Diabetes Management for Adults   AMBULATORY CARE:   Type 2 diabetes  is a disease that affects how your body uses glucose (sugar). Either your body cannot make enough insulin, or it cannot use the insulin correctly. It is important to keep diabetes controlled to prevent damage to your heart, blood vessels, and other organs. Management will help you feel well and enjoy your daily activities. Your diabetes care team providers can help you make a plan to fit diabetes care into your schedule. Your plan can change over time to fit your needs and your family's needs. Have someone call your local emergency number (911 in the [address]) if:   • You cannot be woken. • You have signs of diabetic ketoacidosis:     ? confusion, fatigue    ? vomiting    ? rapid heartbeat    ? fruity smelling breath    ? extreme thirst    ? dry mouth and skin    • You have any of the following signs of a heart attack:      ? Squeezing, pressure, or pain in your chest    ? You may  also have any of the following:     - Discomfort or pain in your back, neck, jaw, stomach, or arm    - Shortness of breath    - Nausea or vomiting    - Lightheadedness or a sudden cold sweat    • You have any of the following signs of a stroke:      ? Numbness or drooping on one side of your face     ? Weakness in an arm or leg    ? Confusion or difficulty speaking    ? Dizziness, a severe headache, or vision loss    Call your doctor or diabetes care team provider if:   • You have a sore or wound that will not heal.    • You have a change in the amount you urinate.     • Your blood sugar levels are higher than your target goals. • You often have lower blood sugar levels than your target goals. • Your skin is red, dry, warm, or swollen. • You have trouble coping with diabetes, or you feel anxious or depressed. • You have questions or concerns about your condition or care. What you need to know about high blood sugar levels:  High blood sugar levels may not cause any symptoms. You may feel more thirsty or urinate more often than usual. Over time, high blood sugar levels can damage your nerves, blood vessels, tissues, and organs. The following can increase your blood sugar levels:  • Large meals or large amounts of carbohydrates at one time    • Less physical activity    • Stress    • Illness    • A lower dose of diabetes medicine or insulin, or a late dose    What you need to know about low blood sugar levels:  Symptoms include feeling shaky, dizzy, irritable, or confused. You can prevent symptoms by keeping your blood sugar levels from going too low. • Treat a low blood sugar level right away:      ? Drink 4 ounces of juice or have 1 tube of glucose gel. ? Check your blood sugar level again 10 to 15 minutes later. ? When the level goes back to normal, eat a meal or snack to prevent another decrease. • Keep glucose gel, raisins, or hard candy with you at all times to treat a low blood sugar level. • Your blood sugar level can get too low if you take diabetes medicine or insulin and do not eat enough food. • If you use insulin, check your blood sugar level before you exercise. ? If your blood sugar level is below 100 mg/dL, eat 4 crackers or 2 ounces of raisins, or drink 4 ounces of juice. ? Check your level every 30 minutes if you exercise longer than 1 hour. ? You may need a snack during or after exercise. What you can do to manage your blood sugar levels:   • Check your blood sugar levels as directed and as needed. Several items are available to use to check your levels.  You may need to check by testing a drop of blood in a glucose monitor. You may instead be given a continuous glucose monitoring (CGM) device. The device is worn at all times. The CGM checks your blood sugar level every 5 minutes. It sends results to an electronic device such as a smart phone. A CGM can be used with or without an insulin pump. You and your diabetes care team providers will decide on the best method for you. The goal for blood sugar levels before meals  is between 80 and 130 mg/dL and 2 hours after eating  is lower than 180 mg/dL. • Make healthy food choices. Work with a dietitian to develop a meal plan that works for you and your schedule. A dietitian can help you learn how to eat the right amount of carbohydrates during your meals and snacks. Carbohydrates can raise your blood sugar level if you eat too many at one time. Examples of foods that contain carbohydrates are breads, cereals, rice, pasta, and sweets. • Eat high-fiber foods as directed. Fiber helps improve blood sugar levels. Fiber also lowers your risk for heart disease and other problems diabetes can cause. Examples of high-fiber foods include vegetables, whole-grain bread, and beans such as villarreal beans. Your dietitian can tell you how much fiber to have each day. • Get regular physical activity. Physical activity can help you get to your target blood sugar level goal and manage your weight. Get at least 150 minutes of moderate to vigorous aerobic physical activity each week. Do not miss more than 2 days in a row. Do not sit longer than 30 minutes at a time. Your healthcare provider can help you create an activity plan. The plan can include the best activities for you and can help you build your strength and endurance. • Maintain a healthy weight. Ask your team what a healthy weight is for you. A healthy weight can help you control diabetes and prevent heart disease.  Ask your team to help you create a weight loss plan, if needed. Weight loss can help make a difference in managing diabetes. Your team will help you set a weight-loss goal, such as 10 to 15 pounds, or 5% of your extra weight. Together you and your team can set manageable weight loss goals. • Take your diabetes medicine or insulin as directed. You may need diabetes medicine, insulin, or both to help control your blood sugar levels. Your healthcare provider will teach you how and when to take your diabetes medicine or insulin. You will also be taught about side effects oral diabetes medicine can cause. Insulin may be injected or given through a pump or pen. You and your providers will decide on the best method for you:    ? An insulin pump  is an implanted device that gives your insulin 24 hours a day. An insulin pump prevents the need for multiple insulin injections in a day. ? An insulin pen  is a device prefilled with the right amount of insulin. ? You and your family members will be taught how to draw up and give insulin  if this is the best method for you. Your providers will also teach you how to dispose of needles and syringes. ? You will learn how much insulin you need  and when to give it. You will be taught when not to give insulin. You will also be taught what to do if your blood sugar level drops too low. This may happen if you take insulin and do not eat the right amount of carbohydrates. More ways to manage type 2 diabetes:   • Wear medical alert identification. Wear medical alert jewelry or carry a card that says you have diabetes. Ask your provider where to get these items. • Do not smoke. Nicotine and other chemicals in cigarettes and cigars can cause lung and blood vessel damage. It also makes it more difficult to manage your diabetes. Ask your provider for information if you currently smoke and need help to quit.  Do not use e-cigarettes or smokeless tobacco in place of cigarettes or to help you quit. They still contain nicotine. • Check your feet each day for cuts, scratches, calluses, or other wounds. Look for redness and swelling, and feel for warmth. Wear shoes that fit well. Check your shoes for rocks or other objects that can hurt your feet. Do not walk barefoot or wear shoes without socks. Wear cotton socks to help keep your feet dry. • Ask about vaccines you may need. You have a higher risk for serious illness if you get the flu, pneumonia, COVID-19, or hepatitis. Ask your provider if you should get vaccines to prevent these or other diseases, and when to get the vaccines. • Talk to your provider if you become stressed about diabetes care. Sometimes being able to fit diabetes care into your life can cause increased stress. The stress can cause you not to take care of yourself properly. Your care team providers can help by offering tips about self-care. Your providers may suggest you talk to a mental health provider who can listen and offer help with self-care issues. • Have your A1c checked as directed. Your provider may check your A1c every 3 months, or 2 times each year if your diabetes is controlled. An A1c test shows the average amount of sugar in your blood over the past 2 to 3 months. Your provider will tell you what your A1c level should be. • Have screening tests as directed. Your provider may recommend screening for complications of diabetes and other conditions that may develop. Some screenings may begin right away and some may happen within the first 5 years of diagnosis:    ? Examples of diabetes complications  include kidney problems, high cholesterol, high blood pressure, blood vessel problems, eye problems, and sleep apnea. ? You may be screened for a low vitamin B level  if you take oral diabetes medicine for a long time. ? Women of childbearing years may be screened  for polycystic ovarian syndrome (PCOS).     Follow up with your doctor or diabetes care team providers as directed: You may need to have blood tests done before your follow-up visit. The test results will show if changes need to be made in your treatment or self-care. Talk to your provider if you cannot afford your medicine. Write down your questions so you remember to ask them during your visits. © Copyright Kyrie [employer]iones 2022 Information is for End User's use only and may not be sold, redistributed or otherwise used for commercial purposes. The above information is an  only. It is not intended as medical advice for individual conditions or treatments. Talk to your doctor, nurse or pharmacist before following any medical regimen to see if it is safe and effective for you.

## 2023-08-29 NOTE — ASSESSMENT & PLAN NOTE
BMI Counseling: Body mass index is 37.8 kg/m². The BMI is above normal. Nutrition recommendations include reducing portion sizes, decreasing overall calorie intake and 3-5 servings of fruits/vegetables daily. Exercise recommendations include vigorous aerobic physical activity for 75 minutes/week.

## 2023-08-29 NOTE — ASSESSMENT & PLAN NOTE
Lab Results   Component Value Date    HGBA1C 6.9 (H) 08/17/2023       Patient does not want to use medication at this time she knows her diet has been poor lately and she wants to work on this first. Referral for diabetic education. Labs in 3 months. If not improved at that time will start medication.

## 2023-09-06 ENCOUNTER — OFFICE VISIT (OUTPATIENT)
Dept: DIABETES SERVICES | Facility: HOSPITAL | Age: 33
End: 2023-09-06
Payer: COMMERCIAL

## 2023-09-06 VITALS — BODY MASS INDEX: 37.74 KG/M2 | HEIGHT: 63 IN | WEIGHT: 213 LBS

## 2023-09-06 DIAGNOSIS — E11.65 TYPE 2 DIABETES MELLITUS WITH HYPERGLYCEMIA, WITHOUT LONG-TERM CURRENT USE OF INSULIN (HCC): Primary | ICD-10-CM

## 2023-09-06 PROCEDURE — 97802 MEDICAL NUTRITION INDIV IN: CPT | Performed by: DIETITIAN, REGISTERED

## 2023-09-06 NOTE — PROGRESS NOTES
Medical Nutrition Therapy     Assessment    Visit Type: Initial visit  Chief complaint:  Type 2 Dibaetes     HPI:  Met with Etta Abramspranay for initial MNT. New diagnosis of type 2 diabetes, A1c 6.9%. She has been running in the prediabetic realm prior to that, and had gestational diabetes a year ago with her baby, likely just never recouped from there. No diabetes medication started, will work on lifestyle changes first.  She is not testing at home, but she does still have her supplies from gestational.  I did encourage her to do some testing to be able to use this as a guide for making lifestyle changes. I encouraged paired testing, before meal and 2 hours after meal, rotating between meals, to assess how food affects her blood sugars and aid in lifestyle changes. Food recall shows primary issues: A lot of meals are out to eat, a mixture between fast food, takeout, and quick grabbing go items. Set a primary change of changing this, working on more preplanning of all meals. Discussed stopping the sweet tea from [employer]. Discussed decreasing fast food as well as making better selections while at [employer] for breakfast.  Discussed having lower carb snacks versus high carb snacks and sweets in the break room. She states she is not ready or interested in a detailed eating plan, he eats counting and tracking things, therefore looked at it from more of a visual and big picture standpoint and goalsetting rather than detailed carb counting. Discussed the benefit of exercise and working to apply this wherever she can. States she knows what good things to be able to do but struggles with consistency, will do well for about a week and then be tired of it and done. Discussed strategies to help with that. Together we discussed what foods contain CHO, reading a food label, serving sizes, and set a carb goal of 30-45g CHO/meal to promote weight loss with 15g snacks.  Put together sample meals for Naya's reference and mak Person's current eating plan. Good understanding, Marina Gracia will call with questions or for more education. Follow up as needed if not improving. Ht Readings from Last 1 Encounters:   09/06/23 5' 3" (1.6 m)     Wt Readings from Last 3 Encounters:   09/06/23 96.6 kg (213 lb)   08/29/23 96.8 kg (213 lb 6.4 oz)   05/16/23 92.9 kg (204 lb 12.8 oz)        Body mass index is 37.73 kg/m². Lab Results   Component Value Date    HGBA1C 6.9 (H) 08/17/2023    HGBA1C 6.4 (H) 08/15/2022    HGBA1C 6.5 (H) 05/24/2022       No results found for: "CHOL"  Lab Results   Component Value Date    HDL 59 08/17/2023    HDL 53 09/13/2022    HDL 63 07/19/2021     Lab Results   Component Value Date    LDLCALC 78 08/17/2023    LDLCALC 89 09/13/2022    LDLCALC 62 07/19/2021     Lab Results   Component Value Date    TRIG 81 08/17/2023    TRIG 339 (H) 09/13/2022    TRIG 69 07/19/2021     Lab Results   Component Value Date    CHOLHDL 2.6 08/17/2023    CHOLHDL 3.7 09/13/2022       Weight Change: No    Medical Diagnosis/ICD 10 Code:  E11.65    Barriers to Learning: no barriers    Do you follow any special diet presently?: No  Who shops: patient and spouse  Who cooks: patient and spouse    Food Log: Completed via the method of food recall- 10hrs 4 days a week 7am-5pm.     Breakfast: up around 5:30am, breakfast not til 7:30am: m[employer] sausage egg and cheese mcgriddle and sweet tea and hash brown- 3 days a week: will make egg muffins, overnight oats doesn't love those. Banana or fruit. At home- doesn't always have something- eggs, pancakes french toast.   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 Dinner: 5:30-8pm: meat starch veggie hot meal not much, quick things.  Stuff from the freezer, orders out a lot. Omelet, grilled cheese, sometimes just picks at things. Evening Snack: salty stuff goldfish, big on ice cream, bars of ice cream. Bed 9pm.   Beverages: water a lot, no coffee, iced tea sweet tea from [employer], no soda, energy drinks, milk not much,   Eating out/Take out:often  Exercise : ADL, nothing structured. Nutrition Diagnosis:  Food and nutrition related knowledge deficit  related to Lack of prior exposure to accurate nutrition related information as evidenced by Verbalizes inaccurate or incomplete information    Intervention: plate method, label reading, behavior modification strategies, carbohydrate counting, meal planning, individualized meal plan and monitoring portion control     Treatment Goals: Patient understands education and recommendations    Education Material Given  Jamil Hilda was provided the Portion Booklet and Planning Healthy Meals     Monitoring and evaluation:    Term code indicator   1.6.3 Carbohydrate Intake Criteria: 30-45g CHO per meal, 15g CHO snacks    Patient’s Response to Instruction:  Owen Levi  Expected Compliancefair    Thank you for coming to the [address] [place] [address]  for education today. Please feel free to call with any questions or concerns.     Romelia Haskins, [id number] [address]  [address] [facility] [id number]

## 2023-09-06 NOTE — Clinical Note
DM MNT completed- for your records, no action needed.  Thanks! -Tania Chua RD CDE clears up til 0800/yes

## 2023-10-27 DIAGNOSIS — F41.1 GAD (GENERALIZED ANXIETY DISORDER): ICD-10-CM

## 2023-10-27 DIAGNOSIS — F32.0 CURRENT MILD EPISODE OF MAJOR DEPRESSIVE DISORDER WITHOUT PRIOR EPISODE (HCC): ICD-10-CM

## 2023-10-27 RX ORDER — ESCITALOPRAM OXALATE 10 MG/1
10 TABLET ORAL DAILY
Qty: 30 TABLET | Refills: 2 | Status: SHIPPED | OUTPATIENT
Start: 2023-10-27

## 2023-11-24 LAB
ALBUMIN SERPL-MCNC: 4.3 G/DL (ref 3.9–4.9)
ALBUMIN/CREAT UR: <6 MG/G CREAT (ref 0–29)
ALBUMIN/GLOB SERPL: 1.6 {RATIO} (ref 1.2–2.2)
ALP SERPL-CCNC: 131 IU/L (ref 44–121)
ALT SERPL-CCNC: 9 IU/L (ref 0–32)
AST SERPL-CCNC: 14 IU/L (ref 0–40)
BILIRUB SERPL-MCNC: 0.3 MG/DL (ref 0–1.2)
BUN SERPL-MCNC: 12 MG/DL (ref 6–20)
BUN/CREAT SERPL: 16 (ref 9–23)
CALCIUM SERPL-MCNC: 9.7 MG/DL (ref 8.7–10.2)
CHLORIDE SERPL-SCNC: 101 MMOL/L (ref 96–106)
CHOLEST SERPL-MCNC: 170 MG/DL (ref 100–199)
CO2 SERPL-SCNC: 23 MMOL/L (ref 20–29)
CREAT SERPL-MCNC: 0.76 MG/DL (ref 0.57–1)
CREAT UR-MCNC: 54.3 MG/DL
EGFR: 105 ML/MIN/1.73
EST. AVERAGE GLUCOSE BLD GHB EST-MCNC: 157 MG/DL
GLOBULIN SER-MCNC: 2.7 G/DL (ref 1.5–4.5)
GLUCOSE SERPL-MCNC: 125 MG/DL (ref 70–99)
HBA1C MFR BLD: 7.1 % (ref 4.8–5.6)
HDLC SERPL-MCNC: 53 MG/DL
LDLC SERPL CALC-MCNC: 98 MG/DL (ref 0–99)
LDLC/HDLC SERPL: 1.8 RATIO (ref 0–3.2)
MICROALBUMIN UR-MCNC: <3 UG/ML
POTASSIUM SERPL-SCNC: 4.6 MMOL/L (ref 3.5–5.2)
PROT SERPL-MCNC: 7 G/DL (ref 6–8.5)
SL AMB VLDL CHOLESTEROL CALC: 19 MG/DL (ref 5–40)
SODIUM SERPL-SCNC: 137 MMOL/L (ref 134–144)
TRIGL SERPL-MCNC: 107 MG/DL (ref 0–149)

## 2023-11-30 ENCOUNTER — OFFICE VISIT (OUTPATIENT)
Dept: FAMILY MEDICINE CLINIC | Facility: CLINIC | Age: 33
End: 2023-11-30
Payer: COMMERCIAL

## 2023-11-30 VITALS
HEIGHT: 63 IN | TEMPERATURE: 96.9 F | WEIGHT: 208 LBS | DIASTOLIC BLOOD PRESSURE: 82 MMHG | RESPIRATION RATE: 16 BRPM | SYSTOLIC BLOOD PRESSURE: 118 MMHG | OXYGEN SATURATION: 98 % | HEART RATE: 70 BPM | BODY MASS INDEX: 36.86 KG/M2

## 2023-11-30 DIAGNOSIS — F41.1 GAD (GENERALIZED ANXIETY DISORDER): ICD-10-CM

## 2023-11-30 DIAGNOSIS — E11.65 TYPE 2 DIABETES MELLITUS WITH HYPERGLYCEMIA, WITHOUT LONG-TERM CURRENT USE OF INSULIN (HCC): ICD-10-CM

## 2023-11-30 DIAGNOSIS — F32.0 CURRENT MILD EPISODE OF MAJOR DEPRESSIVE DISORDER WITHOUT PRIOR EPISODE (HCC): Primary | ICD-10-CM

## 2023-11-30 PROCEDURE — 99214 OFFICE O/P EST MOD 30 MIN: CPT | Performed by: FAMILY MEDICINE

## 2023-11-30 NOTE — ASSESSMENT & PLAN NOTE
Did not tolerate zoloft  Taking lexapro 10 mg daily  Reports feeling ok on this dose and so we will continue for now.
Lab Results   Component Value Date    HGBA1C 7.1 (H) 11/22/2023     Her HA1C did increase slightly since last visit  She had apt with diabetes education and has been working on this.    We discussed at length options for treatment  She wishes to try harder on her diet and agrees that if her labs do not improve in 3 months we will start medication
Advanced care planning/counseling discussion

## 2023-11-30 NOTE — PROGRESS NOTES
Ifeoma Cullen 1990 female MRN: [id number]    Family Medicine Follow-up Visit    ASSESSMENT/PLAN  Problem List Items Addressed This Visit          Endocrine    Type 2 diabetes mellitus with hyperglycemia, without long-term current use of insulin ([address])       Lab Results   Component Value Date    HGBA1C 7.1 (H) 11/22/2023   Her HA1C did increase slightly since last visit  She had apt with diabetes education and has been working on this. We discussed at length options for treatment  She wishes to try harder on her diet and agrees that if her labs do not improve in 3 months we will start medication          Relevant Orders    Comprehensive metabolic panel    Hemoglobin A1C       Other    [place] (generalized anxiety disorder)    Current mild episode of major depressive disorder without prior episode ([address]) - Primary     Did not tolerate zoloft  Taking lexapro 10 mg daily  Reports feeling ok on this dose and so we will continue for now. Follow up in 3 months with labs done prior for diabetes check up         No future appointments. SUBJECTIVE  CC: Follow-up (3 month check )      HPI:  Ifeoma Cullen is a 28 y.o. female who presents for check up. HPI    Review of Systems   Constitutional:  Negative for chills, fatigue and fever. HENT:  Negative for congestion, postnasal drip, rhinorrhea and sinus pressure. Eyes:  Negative for photophobia and visual disturbance. Respiratory:  Negative for cough and shortness of breath. Cardiovascular:  Negative for chest pain, palpitations and leg swelling. Gastrointestinal:  Negative for abdominal pain, constipation, diarrhea, nausea and vomiting. Genitourinary:  Negative for difficulty urinating and dysuria. Musculoskeletal:  Negative for arthralgias and myalgias. Skin:  Negative for color change and rash. Neurological:  Negative for dizziness, weakness, light-headedness and headaches.        Historical Information   The patient history was reviewed as follows:    Past Medical History:   Diagnosis Date    Chronic fatigue     Depression     currently on Zoloft 50 mg managed by PCP. Past Surgical History:   Procedure Laterality Date    [place]  DELIVERY ONLY N/A 10/25/2022    Procedure:  SECTION ();   Surgeon: Perlita Collins MD;  Location: [facility];  Service: Obstetrics    TONSILECTOMY AND ADNOIDECTOMY      WISDOM TOOTH EXTRACTION       Family History   Problem Relation Age of Onset    Thyroid disease Mother     Diabetes type II Father     Diabetes type II Maternal Grandmother     Lung cancer Maternal Grandfather     Diabetes type II Maternal Grandfather     Breast cancer Neg Hx     Ovarian cancer Neg Hx     Colon cancer Neg Hx     Down syndrome Neg Hx     Fragile X syndrome Neg Hx     Cystic fibrosis Neg Hx     Uterine cancer Neg Hx       Social History   Social History     Substance and Sexual Activity   Alcohol Use Yes    Alcohol/week: 2.0 standard drinks of alcohol    Types: 2 Standard drinks or equivalent per week    Comment: socially 2 drinks a week     Social History     Substance and Sexual Activity   Drug Use Never     Social History     Tobacco Use   Smoking Status Former    Packs/day: 0.50    Years: 10.00    Total pack years: 5.00    Types: Cigarettes    Quit date: 2019    Years since quittin.5   Smokeless Tobacco Never       Medications:     Current Outpatient Medications:     escitalopram (LEXAPRO) 10 mg tablet, Take 1 tablet (10 mg total) by mouth daily, Disp: 30 tablet, Rfl: 2    intrauterine copper (PARAGARD) IUD, 1 each by Intrauterine route once, Disp: , Rfl:     Prenatal MV-Min-Fe Fum-FA-DHA (PRENATAL 1 PO), Take 1 tablet by mouth daily (Patient not taking: Reported on 2023), Disp: , Rfl:   No Known Allergies    OBJECTIVE    Vitals:   Vitals:    23 0727   BP: 118/82   BP Location: Left arm   Patient Position: Sitting   Cuff Size: Large   Pulse: 70   Resp: 16   Temp: (!) 96.9 °F (36.1 °C)   TempSrc: Tympanic   SpO2: 98%   Weight: 94.3 kg (208 lb)   Height: 5' 3" (1.6 m)           Physical Exam  Constitutional:       Appearance: She is well-developed. HENT:      Head: Normocephalic and atraumatic. Cardiovascular:      Rate and Rhythm: Normal rate and regular rhythm. Heart sounds: Normal heart sounds. Pulmonary:      Effort: Pulmonary effort is normal. No respiratory distress. Breath sounds: Normal breath sounds. No wheezing. Abdominal:      General: Bowel sounds are normal. There is no distension. Palpations: Abdomen is soft. Tenderness: There is no abdominal tenderness. Musculoskeletal:         General: No tenderness. Normal range of motion. Cervical back: Normal range of motion and neck supple. Skin:     General: Skin is warm and dry. Neurological:      Mental Status: She is alert and oriented to person, place, and time.    Psychiatric:         Behavior: Behavior normal.            Labs:        DO Ricardo    11/30/2023

## 2023-12-20 LAB
LEFT EYE DIABETIC RETINOPATHY: NORMAL
RIGHT EYE DIABETIC RETINOPATHY: NORMAL

## 2024-02-20 DIAGNOSIS — F41.1 GAD (GENERALIZED ANXIETY DISORDER): Primary | ICD-10-CM

## 2024-02-20 DIAGNOSIS — F32.0 CURRENT MILD EPISODE OF MAJOR DEPRESSIVE DISORDER WITHOUT PRIOR EPISODE (HCC): ICD-10-CM

## 2024-02-22 ENCOUNTER — PATIENT OUTREACH (OUTPATIENT)
Dept: FAMILY MEDICINE CLINIC | Facility: CLINIC | Age: 34
End: 2024-02-22

## 2024-02-22 NOTE — PROGRESS NOTES
INO BERGER received a referral from patient's PCP due to patient wishing to establish with an OP MH provider.INO BERGER reviewed patients chart and called patient ([phone]). Patient did not answer. INO BERGER left a message including INO BERGER contact information and requested a call back. INO BERGER will attempt to outreach again at a later date.

## 2024-02-27 ENCOUNTER — PATIENT OUTREACH (OUTPATIENT)
Dept: FAMILY MEDICINE CLINIC | Facility: CLINIC | Age: 34
End: 2024-02-27

## 2024-02-27 NOTE — PROGRESS NOTES
INO BERGER received a call back from patient ([phone]) as patient would like to establish with an OP MH provider. INO BERGER introduced role and completed SOC Psychosocial assessment with patient.     Patient is  and lives with her  in a home. Patient is employed full time and is not experiencing any financial issues or facing any barriers to obtaining health care. Patient is independent with all ADLs/ IADLS. Patient has prior hx of anxiety and depression. However, patient denies any current or recent SI, HI plan to harm herself or others. Patient gave verbal understanding that if she is experiencing a mental health emergency she jeferson call 911, go to the ER or call the crisis hotline. Patient agreeable to INO BERGER sending list of in network outpatient mental health to her [email or web address]. INO BERGER sent e-mail of providers and will follow up regarding in about two weeks.

## 2024-02-29 LAB
ALBUMIN SERPL-MCNC: 4.2 G/DL (ref 3.9–4.9)
ALBUMIN/GLOB SERPL: 1.6 {RATIO} (ref 1.2–2.2)
ALP SERPL-CCNC: 131 IU/L (ref 44–121)
ALT SERPL-CCNC: 13 IU/L (ref 0–32)
AST SERPL-CCNC: 16 IU/L (ref 0–40)
BILIRUB SERPL-MCNC: 0.2 MG/DL (ref 0–1.2)
BUN SERPL-MCNC: 12 MG/DL (ref 6–20)
BUN/CREAT SERPL: 17 (ref 9–23)
CALCIUM SERPL-MCNC: 9.4 MG/DL (ref 8.7–10.2)
CHLORIDE SERPL-SCNC: 102 MMOL/L (ref 96–106)
CO2 SERPL-SCNC: 23 MMOL/L (ref 20–29)
CREAT SERPL-MCNC: 0.72 MG/DL (ref 0.57–1)
EGFR: 112 ML/MIN/1.73
EST. AVERAGE GLUCOSE BLD GHB EST-MCNC: 154 MG/DL
GLOBULIN SER-MCNC: 2.6 G/DL (ref 1.5–4.5)
GLUCOSE SERPL-MCNC: 131 MG/DL (ref 70–99)
HBA1C MFR BLD: 7 % (ref 4.8–5.6)
POTASSIUM SERPL-SCNC: 4.2 MMOL/L (ref 3.5–5.2)
PROT SERPL-MCNC: 6.8 G/DL (ref 6–8.5)
SODIUM SERPL-SCNC: 140 MMOL/L (ref 134–144)

## 2024-03-01 NOTE — PROGRESS NOTES
[id number] [facility]: Ms Catherine Dallas was seen today for NST (found under the pregnancy episode) which I reviewed the RN assessment and agree  NST is reactive without decelerations  Reviewed [address]  Baby is very active  Her evening insulin was increased from 72 to 82 units on 9/20 due to high fastings  She states they are better  Continues with metformin and Novolog with meals (20/24/20  Good fetal movement  Please don't hesitate to contact our office with any concerns or questions   -Valeen Mcardle, CRNP      I spent 10 minutes devoted to patient care (3 min chart preparation, 4 minutes face to face and 3 minutes documenting)  Safety planning/Family/Other social support involvement/Long acting injectable medication

## 2024-03-05 ENCOUNTER — OFFICE VISIT (OUTPATIENT)
Dept: FAMILY MEDICINE CLINIC | Facility: CLINIC | Age: 34
End: 2024-03-05
Payer: COMMERCIAL

## 2024-03-05 VITALS
WEIGHT: 212.2 LBS | SYSTOLIC BLOOD PRESSURE: 126 MMHG | OXYGEN SATURATION: 94 % | RESPIRATION RATE: 16 BRPM | TEMPERATURE: 97.5 F | HEIGHT: 63 IN | BODY MASS INDEX: 37.6 KG/M2 | HEART RATE: 94 BPM | DIASTOLIC BLOOD PRESSURE: 86 MMHG

## 2024-03-05 DIAGNOSIS — E11.65 TYPE 2 DIABETES MELLITUS WITH HYPERGLYCEMIA, WITHOUT LONG-TERM CURRENT USE OF INSULIN (HCC): ICD-10-CM

## 2024-03-05 DIAGNOSIS — F32.0 CURRENT MILD EPISODE OF MAJOR DEPRESSIVE DISORDER WITHOUT PRIOR EPISODE (HCC): Primary | ICD-10-CM

## 2024-03-05 PROCEDURE — 99214 OFFICE O/P EST MOD 30 MIN: CPT | Performed by: FAMILY MEDICINE

## 2024-03-05 NOTE — ASSESSMENT & PLAN NOTE
Failed treatments: zoloft and lexapro  She wishes to see psychiatry for further treatment. Referral to social work was done last visit. She is working on scheduling this.     Depression Screening Follow-up Plan: Patient's depression screening was positive with a PHQ-2 score of . Their PHQ-9 score was 7. Patient assessed for underlying major depression. They have no active suicidal ideations. Brief counseling provided and recommend additional follow-up/re-evaluation next office visit.

## 2024-03-05 NOTE — ASSESSMENT & PLAN NOTE
Lab Results   Component Value Date    HGBA1C 7.0 (H) 02/28/2024     Naya did improve her HA1C from last visit but she is aware it is still elevated  She would like to try another 3 months of lifestyle modifications  If not significantly improved next time will start medications.

## 2024-03-05 NOTE — PROGRESS NOTES
Naya Baxter 1990 female MRN: [id number]    Family Medicine Follow-up Visit    ASSESSMENT/PLAN  Problem List Items Addressed This Visit          Endocrine    Type 2 diabetes mellitus with hyperglycemia, without long-term current use of insulin (HCC)       Lab Results   Component Value Date    HGBA1C 7.0 (H) 02/28/2024     Naya did improve her HA1C from last visit but she is aware it is still elevated  She would like to try another 3 months of lifestyle modifications  If not significantly improved next time will start medications.         Relevant Orders    Hemoglobin A1C    Comprehensive metabolic panel       Other    Current mild episode of major depressive disorder without prior episode (HCC) - Primary     Failed treatments: zoloft and lexapro  She wishes to see psychiatry for further treatment. Referral to social work was done last visit. She is working on scheduling this.     Depression Screening Follow-up Plan: Patient's depression screening was positive with a PHQ-2 score of . Their PHQ-9 score was 7. Patient assessed for underlying major depression. They have no active suicidal ideations. Brief counseling provided and recommend additional follow-up/re-evaluation next office visit.             Follow up in 3-4 months with lab done prior          Future Appointments   Date Time Provider Department Center   3/27/2024  2:45 PM Karthik Arrieta MD [facility]          SUBJECTIVE  CC: Follow-up (3 month check )      HPI:  Naya Baxter is a 33 y.o. female who presents for check up,      HPI    Review of Systems   Constitutional:  Negative for chills, fatigue and fever.   HENT:  Negative for congestion, postnasal drip, rhinorrhea and sinus pressure.    Eyes:  Negative for photophobia and visual disturbance.   Respiratory:  Negative for cough and shortness of breath.    Cardiovascular:  Negative for chest pain, palpitations and leg swelling.   Gastrointestinal:  Negative for  abdominal pain, constipation, diarrhea, nausea and vomiting.   Genitourinary:  Negative for difficulty urinating and dysuria.   Musculoskeletal:  Negative for arthralgias and myalgias.   Skin:  Negative for color change and rash.   Neurological:  Negative for dizziness, weakness, light-headedness and headaches.       Historical Information   The patient history was reviewed as follows:    Past Medical History:   Diagnosis Date    Chronic fatigue     Depression     currently on Zoloft 50 mg managed by PCP.      Past Surgical History:   Procedure Laterality Date    [place]  DELIVERY ONLY N/A 10/25/2022    Procedure:  SECTION ();  Surgeon: Karthik Arrieta MD;  Location: [facility];  Service: Obstetrics    TONSILECTOMY AND ADNOIDECTOMY      WISDOM TOOTH EXTRACTION       Family History   Problem Relation Age of Onset    Thyroid disease Mother     Diabetes type II Father     Diabetes type II Maternal Grandmother     Lung cancer Maternal Grandfather     Diabetes type II Maternal Grandfather     Breast cancer Neg Hx     Ovarian cancer Neg Hx     Colon cancer Neg Hx     Down syndrome Neg Hx     Fragile X syndrome Neg Hx     Cystic fibrosis Neg Hx     Uterine cancer Neg Hx       Social History   Social History     Substance and Sexual Activity   Alcohol Use Yes    Alcohol/week: 2.0 standard drinks of alcohol    Types: 2 Standard drinks or equivalent per week    Comment: socially 2 drinks a week     Social History     Substance and Sexual Activity   Drug Use Never     Social History     Tobacco Use   Smoking Status Former    Current packs/day: 0.00    Average packs/day: 0.5 packs/day for 10.0 years (5.0 ttl pk-yrs)    Types: Cigarettes    Start date: 2009    Quit date: 2019    Years since quittin.8   Smokeless Tobacco Never       Medications:     Current Outpatient Medications:     intrauterine copper (PARAGARD) IUD, 1 each by Intrauterine route once, Disp: , Rfl:   No Known  "Allergies    OBJECTIVE    Vitals:   Vitals:    03/05/24 0748   BP: 126/86   BP Location: Right arm   Patient Position: Sitting   Cuff Size: Large   Pulse: 94   Resp: 16   Temp: 97.5 °F (36.4 °C)   TempSrc: Tympanic   SpO2: 94%   Weight: 96.3 kg (212 lb 3.2 oz)   Height: 5' 3\" (1.6 m)           Physical Exam  Constitutional:       Appearance: She is well-developed.   HENT:      Head: Normocephalic and atraumatic.   Eyes:      Extraocular Movements: Extraocular movements intact.      Conjunctiva/sclera: Conjunctivae normal.   Cardiovascular:      Rate and Rhythm: Normal rate and regular rhythm.      Heart sounds: Normal heart sounds.   Pulmonary:      Effort: Pulmonary effort is normal. No respiratory distress.      Breath sounds: Normal breath sounds. No wheezing.   Musculoskeletal:         General: No tenderness. Normal range of motion.      Cervical back: Normal range of motion and neck supple.   Skin:     General: Skin is warm and dry.   Neurological:      Mental Status: She is alert and oriented to person, place, and time.   Psychiatric:         Mood and Affect: Mood normal.         Behavior: Behavior normal.            Labs:        Alis Smalls DO    3/5/2024      "

## 2024-03-12 ENCOUNTER — PATIENT OUTREACH (OUTPATIENT)
Dept: FAMILY MEDICINE CLINIC | Facility: CLINIC | Age: 34
End: 2024-03-12

## 2024-03-12 NOTE — PROGRESS NOTES
INO BERGER called patient ([phone] ) to follow up and discuss if she had established with an outpatient mental health provider. Patient did not answer, INO BERGER left a message including INO BERGER contact information and requested a call back. INO BERGER will attempt to outreach again at a later date.

## 2024-03-26 ENCOUNTER — PATIENT OUTREACH (OUTPATIENT)
Dept: FAMILY MEDICINE CLINIC | Facility: CLINIC | Age: 34
End: 2024-03-26

## 2024-03-26 NOTE — PROGRESS NOTES
INO BERGER called patient ([phone]) a second time to discuss if she had been able to establish with an OP MH provider. Patient did not answer, INO BERGER left a message including INO BERGER contact information and requested a call back. INO BERGER will send unable to reach letter via [email or web address] and wait two weeks for a response prior to closing. INO BERGER will continue to follow.

## 2024-03-26 NOTE — LETTER
[address]  [address] [address]  [phone]    Re: Unable to Reach   3/26/2024       Dear Naya,    I am a [facility]  and wanted to be certain you had information to contact me should you desire assistance with or have questions about non-medical aspects of your care such as [but not limited to] medical insurance, housing, transportation, material needs, or emergency needs.  If I do not have an answer I will assist you in finding the appropriate agency or individual who can help.      Please feel free to contact me at [phone]. Thank You.    Sincerely,         Dee Tucker MSW , LSW

## 2024-04-09 ENCOUNTER — PATIENT OUTREACH (OUTPATIENT)
Dept: FAMILY MEDICINE CLINIC | Facility: CLINIC | Age: 34
End: 2024-04-09

## 2024-04-09 NOTE — PROGRESS NOTES
INO BERGER received an reminder in basket message as an unable to reach letter was sent two weeks ago to patient. No response was received. INO BERGER will close, please re consult INO BERGER as needed.

## 2024-06-06 LAB
ALBUMIN SERPL-MCNC: 4.3 G/DL (ref 3.9–4.9)
ALBUMIN/GLOB SERPL: 1.7 {RATIO} (ref 1.2–2.2)
ALP SERPL-CCNC: 128 IU/L (ref 44–121)
ALT SERPL-CCNC: 12 IU/L (ref 0–32)
AST SERPL-CCNC: 13 IU/L (ref 0–40)
BILIRUB SERPL-MCNC: 0.2 MG/DL (ref 0–1.2)
BUN SERPL-MCNC: 15 MG/DL (ref 6–20)
BUN/CREAT SERPL: 20 (ref 9–23)
CALCIUM SERPL-MCNC: 9.2 MG/DL (ref 8.7–10.2)
CHLORIDE SERPL-SCNC: 103 MMOL/L (ref 96–106)
CO2 SERPL-SCNC: 22 MMOL/L (ref 20–29)
CREAT SERPL-MCNC: 0.75 MG/DL (ref 0.57–1)
EGFR: 106 ML/MIN/1.73
EST. AVERAGE GLUCOSE BLD GHB EST-MCNC: 151 MG/DL
GLOBULIN SER-MCNC: 2.5 G/DL (ref 1.5–4.5)
GLUCOSE SERPL-MCNC: 141 MG/DL (ref 70–99)
HBA1C MFR BLD: 6.9 % (ref 4.8–5.6)
POTASSIUM SERPL-SCNC: 4.8 MMOL/L (ref 3.5–5.2)
PROT SERPL-MCNC: 6.8 G/DL (ref 6–8.5)
SODIUM SERPL-SCNC: 139 MMOL/L (ref 134–144)

## 2024-07-30 ENCOUNTER — OFFICE VISIT (OUTPATIENT)
Dept: FAMILY MEDICINE CLINIC | Facility: CLINIC | Age: 34
End: 2024-07-30
Payer: COMMERCIAL

## 2024-07-30 VITALS
HEART RATE: 87 BPM | DIASTOLIC BLOOD PRESSURE: 84 MMHG | OXYGEN SATURATION: 100 % | WEIGHT: 212.6 LBS | TEMPERATURE: 97.5 F | HEIGHT: 63 IN | BODY MASS INDEX: 37.67 KG/M2 | SYSTOLIC BLOOD PRESSURE: 118 MMHG | RESPIRATION RATE: 16 BRPM

## 2024-07-30 DIAGNOSIS — R74.8 ELEVATED ALKALINE PHOSPHATASE LEVEL: ICD-10-CM

## 2024-07-30 DIAGNOSIS — E11.65 TYPE 2 DIABETES MELLITUS WITH HYPERGLYCEMIA, WITHOUT LONG-TERM CURRENT USE OF INSULIN (HCC): Primary | ICD-10-CM

## 2024-07-30 DIAGNOSIS — F32.0 CURRENT MILD EPISODE OF MAJOR DEPRESSIVE DISORDER WITHOUT PRIOR EPISODE (HCC): ICD-10-CM

## 2024-07-30 PROCEDURE — 99214 OFFICE O/P EST MOD 30 MIN: CPT | Performed by: FAMILY MEDICINE

## 2024-07-30 NOTE — ASSESSMENT & PLAN NOTE
Lab Results   Component Value Date    HGBA1C 6.9 (H) 06/06/2024     HA1C continues to improve slowly  She would like to avoid medication  We discussed as long as it is improving with lifestyle changes we will continue to monitor  If her levels worsen we will further discuss medication

## 2024-07-30 NOTE — PROGRESS NOTES
Naya Baxter 1990 female MRN: [id number]    Family Medicine Follow-up Visit    ASSESSMENT/PLAN  Problem List Items Addressed This Visit          Endocrine    Type 2 diabetes mellitus with hyperglycemia, without long-term current use of insulin (HCC) - Primary       Lab Results   Component Value Date    HGBA1C 6.9 (H) 06/06/2024     HA1C continues to improve slowly  She would like to avoid medication  We discussed as long as it is improving with lifestyle changes we will continue to monitor  If her levels worsen we will further discuss medication          Relevant Orders    Hemoglobin A1C    Comprehensive metabolic panel    CBC and differential    Lipid Panel with Direct LDL reflex    TSH, 3rd generation with Free T4 reflex    Albumin / creatinine urine ratio       Behavioral Health    Current mild episode of major depressive disorder without prior episode (HCC)     She had failed lexapro and zoloft in the past  Reports she is doing ok off medication at this time  No desire for further treatment at this time           Other Visit Diagnoses       Elevated alkaline phosphatase level        Relevant Orders    US abdomen complete            Follow up in 3 months for CP or sooner if needed          No future appointments.       SUBJECTIVE  CC: Follow-up (3 month check and lab review )      HPI:  Naya Baxter is a 34 y.o. female who presents for check up    HPI    Review of Systems   Constitutional:  Negative for chills, fatigue and fever.   HENT:  Negative for congestion, postnasal drip, rhinorrhea and sinus pressure.    Eyes:  Negative for photophobia and visual disturbance.   Respiratory:  Negative for cough and shortness of breath.    Cardiovascular:  Negative for chest pain, palpitations and leg swelling.   Gastrointestinal:  Negative for abdominal pain, constipation, diarrhea, nausea and vomiting.   Genitourinary:  Negative for difficulty urinating and dysuria.   Musculoskeletal:  Negative for  arthralgias and myalgias.   Skin:  Negative for color change and rash.   Neurological:  Negative for dizziness, weakness, light-headedness and headaches.       Historical Information   The patient history was reviewed as follows:    Past Medical History:   Diagnosis Date    Chronic fatigue     Depression     currently on Zoloft 50 mg managed by PCP.      Past Surgical History:   Procedure Laterality Date    [place]  DELIVERY ONLY N/A 10/25/2022    Procedure:  SECTION ();  Surgeon: Karthik Arrieta MD;  Location: [facility];  Service: Obstetrics    TONSILECTOMY AND ADNOIDECTOMY      WISDOM TOOTH EXTRACTION       Family History   Problem Relation Age of Onset    Thyroid disease Mother     Diabetes type II Father     Diabetes type II Maternal Grandmother     Lung cancer Maternal Grandfather     Diabetes type II Maternal Grandfather     Breast cancer Neg Hx     Ovarian cancer Neg Hx     Colon cancer Neg Hx     Down syndrome Neg Hx     Fragile X syndrome Neg Hx     Cystic fibrosis Neg Hx     Uterine cancer Neg Hx       Social History   Social History     Substance and Sexual Activity   Alcohol Use Yes    Alcohol/week: 2.0 standard drinks of alcohol    Types: 2 Standard drinks or equivalent per week    Comment: socially 2 drinks a week     Social History     Substance and Sexual Activity   Drug Use Never     Social History     Tobacco Use   Smoking Status Former    Current packs/day: 0.00    Average packs/day: 0.5 packs/day for 10.0 years (5.0 ttl pk-yrs)    Types: Cigarettes    Start date: 2009    Quit date: 2019    Years since quittin.2   Smokeless Tobacco Never       Medications:     Current Outpatient Medications:     intrauterine copper (PARAGARD) IUD, 1 each by Intrauterine route once, Disp: , Rfl:   No Known Allergies    OBJECTIVE    Vitals:   Vitals:    24 0730   BP: 118/84   BP Location: Left arm   Patient Position: Sitting   Cuff Size: Large   Pulse: 87   Resp: 16   Temp: 97.5  "°F (36.4 °C)   TempSrc: Tympanic   SpO2: 100%   Weight: 96.4 kg (212 lb 9.6 oz)   Height: 5' 3\" (1.6 m)           Physical Exam  Constitutional:       Appearance: She is well-developed.   HENT:      Head: Normocephalic and atraumatic.   Eyes:      Pupils: Pupils are equal, round, and reactive to light.   Cardiovascular:      Rate and Rhythm: Normal rate and regular rhythm.      Heart sounds: Normal heart sounds.   Pulmonary:      Effort: Pulmonary effort is normal. No respiratory distress.      Breath sounds: Normal breath sounds. No wheezing.   Musculoskeletal:         General: No tenderness. Normal range of motion.      Cervical back: Normal range of motion and neck supple.   Skin:     General: Skin is warm and dry.   Neurological:      Mental Status: She is alert and oriented to person, place, and time.   Psychiatric:         Behavior: Behavior normal.            Labs:        Alis Smalls,     7/30/2024      "

## 2024-07-30 NOTE — ASSESSMENT & PLAN NOTE
She had failed lexapro and zoloft in the past  Reports she is doing ok off medication at this time  No desire for further treatment at this time

## 2024-08-30 ENCOUNTER — HOSPITAL ENCOUNTER (OUTPATIENT)
Dept: ULTRASOUND IMAGING | Facility: HOSPITAL | Age: 34
End: 2024-08-30
Payer: COMMERCIAL

## 2024-08-30 DIAGNOSIS — R74.8 ELEVATED ALKALINE PHOSPHATASE LEVEL: ICD-10-CM

## 2024-08-30 PROCEDURE — 76700 US EXAM ABDOM COMPLETE: CPT

## 2024-09-24 ENCOUNTER — OFFICE VISIT (OUTPATIENT)
Dept: URGENT CARE | Facility: CLINIC | Age: 34
End: 2024-09-24
Payer: COMMERCIAL

## 2024-09-24 VITALS
OXYGEN SATURATION: 98 % | RESPIRATION RATE: 16 BRPM | TEMPERATURE: 100.8 F | DIASTOLIC BLOOD PRESSURE: 100 MMHG | HEART RATE: 122 BPM | SYSTOLIC BLOOD PRESSURE: 142 MMHG

## 2024-09-24 DIAGNOSIS — J02.9 ACUTE PHARYNGITIS, UNSPECIFIED ETIOLOGY: Primary | ICD-10-CM

## 2024-09-24 DIAGNOSIS — J03.90 ACUTE TONSILLITIS, UNSPECIFIED ETIOLOGY: ICD-10-CM

## 2024-09-24 LAB — S PYO AG THROAT QL: NEGATIVE

## 2024-09-24 PROCEDURE — 87880 STREP A ASSAY W/OPTIC: CPT | Performed by: PHYSICIAN ASSISTANT

## 2024-09-24 PROCEDURE — G0382 LEV 3 HOSP TYPE B ED VISIT: HCPCS | Performed by: PHYSICIAN ASSISTANT

## 2024-09-24 RX ORDER — AMOXICILLIN 500 MG/1
500 CAPSULE ORAL EVERY 12 HOURS SCHEDULED
Qty: 20 CAPSULE | Refills: 0 | Status: SHIPPED | OUTPATIENT
Start: 2024-09-24 | End: 2024-10-04

## 2024-09-24 NOTE — PROGRESS NOTES
[facility]        NAME: Naya Baxter is a 34 y.o. female  : 1990    MRN: [id number]  DATE: 2024  TIME: 5:54 PM    Assessment and Plan   Acute pharyngitis, unspecified etiology [J02.9]  1. Acute pharyngitis, unspecified etiology  POCT rapid ANTIGEN strepA    Throat culture    Throat culture      2. Acute tonsillitis, unspecified etiology  amoxicillin (AMOXIL) 500 mg capsule            Patient Instructions       Follow up with PCP in 3-5 days.  Proceed to  ER if symptoms worsen.    If tests have been performed at [facility], our office will contact you with results if changes need to be made to the care plan discussed with you at the visit.  [facility]    Chief Complaint     Chief Complaint   Patient presents with   • Sore Throat     Patient with sore throat x2 days. Patient with chills x1 week. Patient also with intermittent headache.          History of Present Illness       Patient presents with chills for the last week.  Last night developed sore throat, headache, and bodyaches.  Denies congestion, runny nose, cough, chest pain, shortness of breath, difficulty breathing.    Sore Throat   This is a new problem. The current episode started yesterday. The problem has been gradually worsening. The pain is worse on the right side. There has been no fever. The pain is at a severity of 5/10. The pain is mild. Associated symptoms include headaches, swollen glands and trouble swallowing. Pertinent negatives include no abdominal pain, congestion, coughing, diarrhea, drooling, ear discharge, ear pain, hoarse voice, plugged ear sensation, neck pain, shortness of breath, stridor or vomiting.       Review of Systems   Review of Systems   Constitutional:  Positive for chills.   HENT:  Positive for sore throat and trouble swallowing. Negative for congestion, drooling, ear discharge, ear pain and hoarse voice.    Respiratory:  Negative for cough,  October 11, 2023    LAUREN Cain-CNP  [address]   [facility], [address] [address]    Patient: Cehly Martinez   YOB: 1959   Date of Visit: 10/11/2023       Dear No referring provider defined for this encounter.    The attached plan of care is being sent to you because your patient’s medical reimbursement requires that you certify the plan of care. Your signature is required to allow uninterrupted insurance coverage.      You may indicate your approval by signing below and faxing this form back to us at Dept Fax: [phone].    Please call Dept: [phone] with any questions or concerns.    Thank you for this referral,        Gume Elizondo OT  [facility]  [address] [facility] [address]    Payer: Payor: [employer] [place] / Plan: [employer] / Product Type: *No Product type* /                                                                         Date:     Dear Gume Elizondo OT,     Re: Ms. Chely Martinez, MRN:[id number]    I certify that I have reviewed the attached plan of care and it is medically necessary for Ms. Chely Martinez (1959) who is under my care.          ______________________________________                    _________________  Provider name and credentials                                           Date and time                                                                                           Plan of Care 10/11/23   Effective from: 10/11/2023  Effective to: 11/10/2023    Plan ID: [id number]            Participants as of Finalize on 10/11/2023    Name Type Comments Contact Info    FAIZA Cain Referring Provider  [phone]    Gume Elizondo OT Occupational Therapist         Last Plan Note     Author: Gume Elizondo OT Status: Signed Last edited: 10/11/2023 10:00 AM       Occupational Therapy    Occupational Therapy Evaluation    Name: Chely Martinez  MRN:  [id number]  : 1959  Date: 10/11/23  Time Calculation  Start Time: 1010  Stop Time: 1048  Time Calculation (min): 38 min    Assessment:  OT Assessment  OT Assessment Results: Decreased upper extremity range of motion, Decreased upper extremity strength, Decreased sensation, Decreased fine motor control  Strengths: Ability to acquire knowledge, Attitude of self, Capable of completing ADLs semi/independent, Coping skills, Rehab experience  Plan:  Outpatient Plan  Treatment Interventions: ADL retraining, UE strengthening/ROM, Endurance training, Patient/family training, Neuromuscular reeducation, Fine motor coordination activities  Frequency: 2x  Duration: 4 weeks  Number of Treatments Authorized: 20  Rehab Potential: Good  Plan of Care Agreement: Patient    Subjective   Current Problem:  1. Closed fracture of distal end of left radius with routine healing, unspecified fracture morphology, subsequent encounter  Follow Up In Occupational Therapy      2. Unspecified fracture of the lower end of left radius, subsequent encounter for closed fracture with routine healing  Referral to Occupational Therapy      3. Unspecified fracture of lower end of left ulna, subsequent encounter for closed fracture with routine healing  Referral to Occupational Therapy      4. Closed fracture of distal end of left ulna with routine healing, unspecified fracture morphology, subsequent encounter  Follow Up In Occupational Therapy      5. Wrist stiffness, left  Follow Up In Occupational Therapy        General:   OT Received On: 10/11/23  General  Reason for Referral: L UE ROM and strengthening dt fractures  Referred By: Marcela Jean  Past Medical History Relevant to Rehab: Pt fell down steps and fractured L wrist and L knee  Family/Caregiver Present: No  General Comment: Had home health when discharged from the hospital, now referred to outpatient  Precautions:  Precautions  STEADI Fall Risk Score (The score of 4 or more indicates an  shortness of breath and stridor.    Gastrointestinal:  Negative for abdominal pain, diarrhea and vomiting.   Musculoskeletal:  Negative for neck pain.   Neurological:  Positive for headaches.         Current Medications       Current Outpatient Medications:   •  amoxicillin (AMOXIL) 500 mg capsule, Take 1 capsule (500 mg total) by mouth every 12 (twelve) hours for 10 days, Disp: 20 capsule, Rfl: 0  •  intrauterine copper (PARAGARD) IUD, 1 each by Intrauterine route once, Disp: , Rfl:     Current Allergies     Allergies as of 2024   • (No Known Allergies)            The following portions of the patient's history were reviewed and updated as appropriate: allergies, current medications, past family history, past medical history, past social history, past surgical history and problem list.     Past Medical History:   Diagnosis Date   • Chronic fatigue    • Depression     currently on Zoloft 50 mg managed by PCP.        Past Surgical History:   Procedure Laterality Date   • MA  DELIVERY ONLY N/A 10/25/2022    Procedure:  SECTION ();  Surgeon: Karthik Arrieta MD;  Location: [facility];  Service: Obstetrics   • TONSILECTOMY AND ADNOIDECTOMY     • WISDOM TOOTH EXTRACTION  2019       Family History   Problem Relation Age of Onset   • Thyroid disease Mother    • Diabetes type II Father    • Diabetes type II Maternal Grandmother    • Lung cancer Maternal Grandfather    • Diabetes type II Maternal Grandfather    • Breast cancer Neg Hx    • Ovarian cancer Neg Hx    • Colon cancer Neg Hx    • Down syndrome Neg Hx    • Fragile X syndrome Neg Hx    • Cystic fibrosis Neg Hx    • Uterine cancer Neg Hx          Medications have been verified.        Objective   /100   Pulse (!) 122   Temp (!) 100.8 °F (38.2 °C)   Resp 16   SpO2 98%   No LMP recorded. Patient has had an implant.       Physical Exam     Physical Exam  Constitutional:       General: She is not in acute distress.     Appearance: Normal  appearance. She is not ill-appearing or diaphoretic.   HENT:      Right Ear: Tympanic membrane, ear canal and external ear normal.      Left Ear: Tympanic membrane, ear canal and external ear normal.      Nose: Nose normal.      Mouth/Throat:      Mouth: Mucous membranes are moist.      Pharynx: Oropharynx is clear. Posterior oropharyngeal erythema present.      Tonsils: Tonsillar exudate present. 2+ on the right. 2+ on the left.   Eyes:      Conjunctiva/sclera: Conjunctivae normal.   Cardiovascular:      Rate and Rhythm: Normal rate and regular rhythm.      Heart sounds: Normal heart sounds.   Pulmonary:      Effort: Pulmonary effort is normal.      Breath sounds: Normal breath sounds.   Skin:     General: Skin is warm and dry.   Neurological:      Mental Status: She is alert.   Psychiatric:         Mood and Affect: Mood normal.         Behavior: Behavior normal.                increased risk of falling): 7  UE Weight Bearing Status: Weight Bearing as Tolerated  Braces Applied: history of bracing     Pain Assessment:  Pain Assessment  Pain Assessment: 0-10  Pain Score: 2  Pain Type: Chronic pain    Objective   Cognition:  Cognition  Overall Cognitive Status: Within Functional Limits  Cognition Test Scores:     Home Living:  Home Living  Type of Home: House  Lives With: Alone  Home Layout: One level  Home Access: Stairs to enter with rails  Entrance Stairs-Number of Steps: 3  Bathroom Shower/Tub: Tub/shower unit  Prior Function Per Pt/Caregiver Report:  Prior Function Per Pt/Caregiver Report  Level of [place]: Independent with ADLs and functional transfers, Independent with homemaking with ambulation, Independent with homemaking with wheelchair  Receives Help From: Family  Vocational: Full time employment (works at [employer], currently [employer])  Hand Dominance: Right  IADL History:  Current License: Yes  Mode of Transportation: Car  ADL:  Eating Assistance: Modified independent (Device)  Bathing Assistance: Modified independent (Device)  UE Dressing Assistance: Modified independent (Device)  LE Dressing Assistance: Modified independent (Device)  Toileting Assistance with Device: Modified independent     Vision:Vision - Basic Assessment  Current Vision: Wears glasses only for reading  Sensation:  Light Touch: Partial deficits in the LUE  Sensation Comment: appears to be limited along median nerve distribution in L hand     Wrist Functional Rating Scale  Quick Dash: 27.27    Wrist AROM WFL unless documented below  R wrist flexion: (70°): 71  L wrist flexion: (70°): 50  R wrist extension: (70°): 58  L wrist extension: (70°): 38  R wrist radial deviation: (20°): 22  L wrist radial deviation: (20°): 27  R ulnar deviation: (30°): 35  L ulnar deviation: (30°): 24      Strength WFL unless documented below  R  strength: 46  L  strength: 23    Left Hand AROM:  L Thumb MCP 0-60 (degrees):  40 Degrees  L Thumb IP 0-80 (degrees): 50 Degrees  L Thumb Radial ADduction/ABduction 0-55 (degrees): 40  L Thumb Palmar ADduction/ABduction 0-45 (degrees): 40  L Thumb Opposition to Index: oppose to middle phalanx of 5th digit  L Index  MCP 0-90 (degrees): 70 Degrees  L Index PIP 0-100 (degrees): 94 Degrees  L Index DIP 0-70 (degrees): 43 Degrees  L Long  MCP 0-90 (degrees): 86 Degrees  L Long PIP 0-100 (degrees): 100 Degrees  L Long DIP 0-70 (degrees): 46 Degrees  L Ring  MCP 0-90 (degrees): 90 Degrees  L Ring PIP 0-100 (degrees): 111 Degrees  L Ring DIP 0-70 (degrees): 40 Degrees  L Little  MCP 0-90 (degrees): 96 Degrees  L Little PIP 0-100 (degrees): 89 Degrees  L Little DIP 0-70 (degrees): 66 Degrees    Goals:  Active       OT Goals       Pt. will improve WRIST ROM BY 30 DEGREES in order to perform self-care, household, work and/or leisure tasks.          Start:  10/11/23    Expected End:  11/08/23            Pt. will demonstrate I with stating and demonstrating home exercise program in order to improve patient's ability to perform self-care, household, and/or leisure tasks.        Start:  10/11/23    Expected End:  11/08/23            Pt. will demo increased L hand fine motor ability as evidenced by performance on 9 hole peg test by 10 seconds       Start:  10/11/23    Expected End:  11/08/23            Pt. will increase hand strength by 15# to increase participation in self-care, household, and work/leisure tasks       Start:  10/11/23    Expected End:  11/08/23                         Current Participants as of 10/11/2023    Name Type Comments Contact Info    Marcela Jean, LAUREN-CNP Referring Provider  [phone]    Signature pending    Gume Elizondo OT Occupational Therapist      Signature pending

## 2024-09-24 NOTE — PATIENT INSTRUCTIONS
Follow-up with your primary care provider in the next 3-5 days.  Any new or worsening symptoms develop get re-evaluated sooner or proceed to the ER.  Throat culture results will be available in a few days.

## 2024-09-28 LAB — B-HEM STREP SPEC QL CULT: NEGATIVE

## 2024-10-23 ENCOUNTER — ANNUAL EXAM (OUTPATIENT)
Dept: OBGYN CLINIC | Facility: CLINIC | Age: 34
End: 2024-10-23
Payer: COMMERCIAL

## 2024-10-23 VITALS
SYSTOLIC BLOOD PRESSURE: 124 MMHG | WEIGHT: 210 LBS | HEIGHT: 63 IN | BODY MASS INDEX: 37.21 KG/M2 | DIASTOLIC BLOOD PRESSURE: 80 MMHG

## 2024-10-23 DIAGNOSIS — Z01.419 ENCOUNTER FOR ANNUAL ROUTINE GYNECOLOGICAL EXAMINATION: Primary | ICD-10-CM

## 2024-10-23 PROCEDURE — 99395 PREV VISIT EST AGE 18-39: CPT | Performed by: STUDENT IN AN ORGANIZED HEALTH CARE EDUCATION/TRAINING PROGRAM

## 2024-10-23 RX ORDER — OFLOXACIN 3 MG/ML
SOLUTION AURICULAR (OTIC)
COMMUNITY
Start: 2024-09-26 | End: 2024-10-23

## 2024-10-23 NOTE — PROGRESS NOTES
Ambulatory Visit  Name: Naya Baxter      : 1990      MRN: [id number]  Encounter Provider: Karthik Arrieta MD  Encounter Date: 10/23/2024   Encounter department: [facility] [place]    Assessment & Plan  Encounter for annual routine gynecological examination  - Routine well gynecologic exam completed today.  - Cervical Cancer Screening: Current ASCCP Guidelines reviewed. Last Pap: 2022. History of abnormal: None.  - HPV Vaccination status: Immunization series complete  - STI screening offered including HIV testing: offered, pt declined  - Contraceptive counseling discussed.  Current contraception: Paragard IUD, satisfied. Due for removal/replacement by 2033.   - The following were reviewed in today's visit: breast self exam, exercise, and healthy diet         History of Present Illness     Naya Baxter is a 34 y.o. female who presents for routine gynecologic exam. She is without complaint today.     History obtained from : patient  Review of Systems   Constitutional:  Negative for chills and fever.   HENT:  Negative for ear pain and sore throat.    Eyes:  Negative for pain and visual disturbance.   Respiratory:  Negative for cough and shortness of breath.    Cardiovascular:  Negative for chest pain and palpitations.   Gastrointestinal:  Negative for abdominal pain and vomiting.   Genitourinary:  Negative for dysuria and hematuria.   Musculoskeletal:  Negative for arthralgias and back pain.   Skin:  Negative for color change and rash.   Neurological:  Negative for seizures and syncope.   All other systems reviewed and are negative.    Past Medical History   Past Medical History:   Diagnosis Date    Chronic fatigue     Depression     currently on Zoloft 50 mg managed by PCP.      Past Surgical History:   Procedure Laterality Date    [place]  DELIVERY ONLY N/A 10/25/2022    Procedure:  SECTION ();  Surgeon: Karthik Arrieta MD;  Location:  [facility];  Service: Obstetrics    TONSILECTOMY AND ADNOIDECTOMY      WISDOM TOOTH EXTRACTION       Family History   Problem Relation Age of Onset    Thyroid disease Mother     Diabetes type II Father     Diabetes type II Maternal Grandmother     Lung cancer Maternal Grandfather     Diabetes type II Maternal Grandfather     Breast cancer Neg Hx     Ovarian cancer Neg Hx     Colon cancer Neg Hx     Down syndrome Neg Hx     Fragile X syndrome Neg Hx     Cystic fibrosis Neg Hx     Uterine cancer Neg Hx      Current Outpatient Medications on File Prior to Visit   Medication Sig Dispense Refill    intrauterine copper (PARAGARD) IUD 1 each by Intrauterine route once      [DISCONTINUED] ofloxacin (FLOXIN) 0.3 % otic solution Every 6 hours (Patient not taking: Reported on 10/23/2024)       No current facility-administered medications on file prior to visit.   No Known Allergies   Current Outpatient Medications on File Prior to Visit   Medication Sig Dispense Refill    intrauterine copper (PARAGARD) IUD 1 each by Intrauterine route once      [DISCONTINUED] ofloxacin (FLOXIN) 0.3 % otic solution Every 6 hours (Patient not taking: Reported on 10/23/2024)       No current facility-administered medications on file prior to visit.      Social History     Tobacco Use    Smoking status: Former     Current packs/day: 0.00     Average packs/day: 0.5 packs/day for 10.0 years (5.0 ttl pk-yrs)     Types: Cigarettes     Start date: 2009     Quit date: 2019     Years since quittin.4     Passive exposure: Never    Smokeless tobacco: Never   Vaping Use    Vaping status: Never Used   Substance and Sexual Activity    Alcohol use: Yes     Alcohol/week: 2.0 standard drinks of alcohol     Types: 2 Standard drinks or equivalent per week     Comment: socially 2 drinks a week    Drug use: Never    Sexual activity: Yes     Partners: Male     Birth control/protection: I.U.D.     Comment: paragard IUD         Objective     /80 (BP  "Location: Left arm, Patient Position: Sitting, Cuff Size: Standard)   Ht 5' 3\" (1.6 m)   Wt 95.3 kg (210 lb)   LMP 08/27/2024   BMI 37.20 kg/m²     Physical Exam  Vitals reviewed. Exam conducted with a chaperone present (Blaise Yanez MA).   Constitutional:       General: She is not in acute distress.     Appearance: Normal appearance. She is well-developed.   HENT:      Head: Normocephalic.   Eyes:      Conjunctiva/sclera: Conjunctivae normal.   Cardiovascular:      Rate and Rhythm: Normal rate.   Pulmonary:      Effort: Pulmonary effort is normal. No respiratory distress.   Chest:      Chest wall: No mass or deformity.   Breasts:     Right: Normal. No mass, nipple discharge, skin change or tenderness.      Left: Normal. No mass, nipple discharge, skin change or tenderness.   Abdominal:      General: Abdomen is flat.      Palpations: Abdomen is soft.      Tenderness: There is no abdominal tenderness.   Genitourinary:     General: Normal vulva.      Exam position: Lithotomy position.      Labia:         Right: No tenderness, lesion or injury.         Left: No tenderness, lesion or injury.       Urethra: No urethral pain, urethral swelling or urethral lesion.      Vagina: Normal. No vaginal discharge, bleeding, lesions or prolapsed vaginal walls.      Cervix: Normal. No cervical motion tenderness, discharge, friability, lesion, erythema or cervical bleeding.      Uterus: Normal. Not deviated and not tender.       Adnexa: Right adnexa normal and left adnexa normal.        Right: No mass, tenderness or fullness.          Left: No mass, tenderness or fullness.        Comments: IUD strings present at os.  Musculoskeletal:         General: No swelling.   Lymphadenopathy:      Upper Body:      Right upper body: No supraclavicular, axillary or pectoral adenopathy.      Left upper body: No supraclavicular, axillary or pectoral adenopathy.   Skin:     General: Skin is warm and dry.   Neurological:      Mental Status: She " is alert.   Psychiatric:         Mood and Affect: Mood normal.         Behavior: Behavior normal.         Thought Content: Thought content normal.         Judgment: Judgment normal.       Karthik Arrieta MD  10/23/2024 1:27 PM

## 2024-12-24 LAB
LEFT EYE DIABETIC RETINOPATHY: NORMAL
RIGHT EYE DIABETIC RETINOPATHY: NORMAL

## 2025-01-17 LAB
ALBUMIN SERPL-MCNC: 4.5 G/DL (ref 3.9–4.9)
ALBUMIN/CREAT UR: <7 MG/G CREAT (ref 0–29)
ALP SERPL-CCNC: 130 IU/L (ref 44–121)
ALT SERPL-CCNC: 13 IU/L (ref 0–32)
AST SERPL-CCNC: 16 IU/L (ref 0–40)
BASOPHILS # BLD AUTO: 0 X10E3/UL (ref 0–0.2)
BASOPHILS NFR BLD AUTO: 0 %
BILIRUB SERPL-MCNC: 0.3 MG/DL (ref 0–1.2)
BUN SERPL-MCNC: 8 MG/DL (ref 6–20)
BUN/CREAT SERPL: 11 (ref 9–23)
CALCIUM SERPL-MCNC: 9.8 MG/DL (ref 8.7–10.2)
CHLORIDE SERPL-SCNC: 98 MMOL/L (ref 96–106)
CHOLEST SERPL-MCNC: 168 MG/DL (ref 100–199)
CO2 SERPL-SCNC: 22 MMOL/L (ref 20–29)
CREAT SERPL-MCNC: 0.74 MG/DL (ref 0.57–1)
CREAT UR-MCNC: 44.2 MG/DL
EGFR: 108 ML/MIN/1.73
EOSINOPHIL # BLD AUTO: 0 X10E3/UL (ref 0–0.4)
EOSINOPHIL NFR BLD AUTO: 0 %
ERYTHROCYTE [DISTWIDTH] IN BLOOD BY AUTOMATED COUNT: 12.8 % (ref 11.7–15.4)
EST. AVERAGE GLUCOSE BLD GHB EST-MCNC: 157 MG/DL
GLOBULIN SER-MCNC: 2.8 G/DL (ref 1.5–4.5)
GLUCOSE SERPL-MCNC: 154 MG/DL (ref 70–99)
HBA1C MFR BLD: 7.1 % (ref 4.8–5.6)
HCT VFR BLD AUTO: 40.2 % (ref 34–46.6)
HDLC SERPL-MCNC: 49 MG/DL
HGB BLD-MCNC: 13.4 G/DL (ref 11.1–15.9)
IMM GRANULOCYTES # BLD: 0 X10E3/UL (ref 0–0.1)
IMM GRANULOCYTES NFR BLD: 0 %
LDLC SERPL CALC-MCNC: 93 MG/DL (ref 0–99)
LDLC/HDLC SERPL: 1.9 RATIO (ref 0–3.2)
LYMPHOCYTES # BLD AUTO: 3.4 X10E3/UL (ref 0.7–3.1)
LYMPHOCYTES NFR BLD AUTO: 34 %
MCH RBC QN AUTO: 31.9 PG (ref 26.6–33)
MCHC RBC AUTO-ENTMCNC: 33.3 G/DL (ref 31.5–35.7)
MCV RBC AUTO: 96 FL (ref 79–97)
MICROALBUMIN UR-MCNC: <3 UG/ML
MONOCYTES # BLD AUTO: 0.7 X10E3/UL (ref 0.1–0.9)
MONOCYTES NFR BLD AUTO: 7 %
NEUTROPHILS # BLD AUTO: 5.7 X10E3/UL (ref 1.4–7)
NEUTROPHILS NFR BLD AUTO: 59 %
PLATELET # BLD AUTO: 296 X10E3/UL (ref 150–450)
POTASSIUM SERPL-SCNC: 4.2 MMOL/L (ref 3.5–5.2)
PROT SERPL-MCNC: 7.3 G/DL (ref 6–8.5)
RBC # BLD AUTO: 4.2 X10E6/UL (ref 3.77–5.28)
SL AMB VLDL CHOLESTEROL CALC: 26 MG/DL (ref 5–40)
SODIUM SERPL-SCNC: 134 MMOL/L (ref 134–144)
TRIGL SERPL-MCNC: 146 MG/DL (ref 0–149)
TSH SERPL DL<=0.005 MIU/L-ACNC: 0.82 UIU/ML (ref 0.45–4.5)
WBC # BLD AUTO: 9.9 X10E3/UL (ref 3.4–10.8)

## 2025-01-19 ENCOUNTER — RESULTS FOLLOW-UP (OUTPATIENT)
Dept: FAMILY MEDICINE CLINIC | Facility: CLINIC | Age: 35
End: 2025-01-19

## 2025-01-23 DIAGNOSIS — Z00.6 ENCOUNTER FOR EXAMINATION FOR NORMAL COMPARISON OR CONTROL IN CLINICAL RESEARCH PROGRAM: ICD-10-CM

## 2025-01-24 ENCOUNTER — APPOINTMENT (OUTPATIENT)
Dept: RADIOLOGY | Facility: CLINIC | Age: 35
End: 2025-01-24
Payer: COMMERCIAL

## 2025-01-24 ENCOUNTER — OFFICE VISIT (OUTPATIENT)
Dept: FAMILY MEDICINE CLINIC | Facility: CLINIC | Age: 35
End: 2025-01-24
Payer: COMMERCIAL

## 2025-01-24 VITALS
WEIGHT: 209.8 LBS | RESPIRATION RATE: 16 BRPM | OXYGEN SATURATION: 100 % | SYSTOLIC BLOOD PRESSURE: 124 MMHG | TEMPERATURE: 98.4 F | DIASTOLIC BLOOD PRESSURE: 80 MMHG | BODY MASS INDEX: 37.17 KG/M2 | HEART RATE: 85 BPM | HEIGHT: 63 IN

## 2025-01-24 DIAGNOSIS — Z00.00 ANNUAL PHYSICAL EXAM: Primary | ICD-10-CM

## 2025-01-24 DIAGNOSIS — R06.83 SNORING: ICD-10-CM

## 2025-01-24 DIAGNOSIS — M25.551 RIGHT HIP PAIN: ICD-10-CM

## 2025-01-24 DIAGNOSIS — F32.0 CURRENT MILD EPISODE OF MAJOR DEPRESSIVE DISORDER WITHOUT PRIOR EPISODE (HCC): ICD-10-CM

## 2025-01-24 DIAGNOSIS — E11.65 TYPE 2 DIABETES MELLITUS WITH HYPERGLYCEMIA, WITHOUT LONG-TERM CURRENT USE OF INSULIN (HCC): ICD-10-CM

## 2025-01-24 PROBLEM — F32.2 SEVERE MAJOR DEPRESSIVE DISORDER (HCC): Status: ACTIVE | Noted: 2025-01-24

## 2025-01-24 PROCEDURE — 99395 PREV VISIT EST AGE 18-39: CPT | Performed by: FAMILY MEDICINE

## 2025-01-24 PROCEDURE — 73503 X-RAY EXAM HIP UNI 4/> VIEWS: CPT

## 2025-01-24 RX ORDER — MULTIVITAMIN
1 TABLET ORAL DAILY
COMMUNITY

## 2025-01-24 RX ORDER — ONDANSETRON 4 MG/1
TABLET, ORALLY DISINTEGRATING ORAL
COMMUNITY
Start: 2024-12-08

## 2025-01-24 RX ORDER — MAGNESIUM L-LACTATE 84 MG
84 TABLET, EXTENDED RELEASE ORAL DAILY
COMMUNITY

## 2025-01-24 NOTE — ASSESSMENT & PLAN NOTE
Failed lexapro and zoloft in the past  Doing fine off medication at this time  Will continue to monitor

## 2025-01-24 NOTE — PROGRESS NOTES
Adult Annual Physical  Name: Naya Baxter      : 1990      MRN: [id number]  Encounter Provider: Alis Smalls DO  Encounter Date: 2025   Encounter department: [facility]    Assessment & Plan  Type 2 diabetes mellitus with hyperglycemia, without long-term current use of insulin (HCC)    Lab Results   Component Value Date    HGBA1C 7.1 (H) 2025     Patient wishes to try nutrition therapy before starting medication  We dicussed needing medication if numbers can not improve with diet and nutrition changes     Orders:    Ambulatory Referral to Diabetic Education; Future    Lipid panel; Future    Comprehensive metabolic panel; Future    Hemoglobin A1C; Future    Annual physical exam         Current mild episode of major depressive disorder without prior episode (HCC)  Failed lexapro and zoloft in the past  Doing fine off medication at this time  Will continue to monitor          Snoring    Orders:    Ambulatory Referral to Sleep Medicine; Future    Right hip pain    Orders:    XR hip/pelvis 4+ vw right if performed; Future    Immunizations and preventive care screenings were discussed with patient today. Appropriate education was printed on patient's after visit summary.    Counseling:  Alcohol/drug use: discussed moderation in alcohol intake, the recommendations for healthy alcohol use, and avoidance of illicit drug use.  Dental Health: discussed importance of regular tooth brushing, flossing, and dental visits.  Injury prevention: discussed safety/seat belts, safety helmets, smoke detectors, carbon monoxide detectors, and smoking near bedding or upholstery.  Sexual health: discussed sexually transmitted diseases, partner selection, use of condoms, avoidance of unintended pregnancy, and contraceptive alternatives.  Exercise: the importance of regular exercise/physical activity was discussed. Recommend exercise 3-5 times per week for at least 30 minutes.        Follow up in 3-4 months for diabetes check up       History of Present Illness     Adult Annual Physical:  Patient presents for annual physical.     Diet and Physical Activity:  - Diet/Nutrition: well balanced diet.  - Exercise: moderate cardiovascular exercise.    Depression Screening:    - PHQ-9 Score: 0    /GYN Health:  - Follows with GYN: yes.     Review of Systems   Constitutional:  Negative for chills, fatigue and fever.   HENT:  Negative for congestion, postnasal drip, rhinorrhea and sinus pressure.    Eyes:  Negative for photophobia and visual disturbance.   Respiratory:  Negative for cough and shortness of breath.    Cardiovascular:  Negative for chest pain, palpitations and leg swelling.   Gastrointestinal:  Negative for abdominal pain, constipation, diarrhea, nausea and vomiting.   Genitourinary:  Negative for difficulty urinating and dysuria.   Musculoskeletal:  Negative for arthralgias and myalgias.   Skin:  Negative for color change and rash.   Neurological:  Negative for dizziness, weakness, light-headedness and headaches.     Pertinent Medical History     Medical History Reviewed by provider this encounter:  Tobacco  Allergies  Meds  Problems  Med Hx  Surg Hx  Fam Hx     .  Past Medical History   Past Medical History:   Diagnosis Date    Chronic fatigue     Depression     currently on Zoloft 50 mg managed by PCP.      Past Surgical History:   Procedure Laterality Date    [place]  DELIVERY ONLY N/A 10/25/2022    Procedure:  SECTION ();  Surgeon: Karthik Arrieta MD;  Location: [facility];  Service: Obstetrics    TONSILECTOMY AND ADNOIDECTOMY      WISDOM TOOTH EXTRACTION  2019     Family History   Problem Relation Age of Onset    Thyroid disease Mother     Diabetes type II Father     Diabetes type II Maternal Grandmother     Lung cancer Maternal Grandfather     Diabetes type II Maternal Grandfather     Breast cancer Neg Hx     Ovarian cancer Neg Hx     Colon cancer Neg Hx      Down syndrome Neg Hx     Fragile X syndrome Neg Hx     Cystic fibrosis Neg Hx     Uterine cancer Neg Hx       reports that she quit smoking about 5 years ago. Her smoking use included cigarettes. She started smoking about 15 years ago. She has a 5 pack-year smoking history. She has never been exposed to tobacco smoke. She has never used smokeless tobacco. She reports that she does not currently use alcohol after a past usage of about 2.0 standard drinks of alcohol per week. She reports that she does not use drugs.  Current Outpatient Medications on File Prior to Visit   Medication Sig Dispense Refill    intrauterine copper (PARAGARD) IUD 1 each by Intrauterine route once      magnesium (MAGTAB) 84 MG (7MEQ) TBCR Take 84 mg by mouth daily 250 mg      Multiple Vitamin (multivitamin) tablet Take 1 tablet by mouth daily With omega 3      ondansetron (ZOFRAN-ODT) 4 mg disintegrating tablet DISSOLVE 1 TABLET (4 MG TOTAL) IN CHEEK EVERY 12 HOURS AS NEEDED FOR NAUSEA OR VOMITING. (Patient not taking: Reported on 1/24/2025)       No current facility-administered medications on file prior to visit.   No Known Allergies   Current Outpatient Medications on File Prior to Visit   Medication Sig Dispense Refill    intrauterine copper (PARAGARD) IUD 1 each by Intrauterine route once      magnesium (MAGTAB) 84 MG (7MEQ) TBCR Take 84 mg by mouth daily 250 mg      Multiple Vitamin (multivitamin) tablet Take 1 tablet by mouth daily With omega 3      ondansetron (ZOFRAN-ODT) 4 mg disintegrating tablet DISSOLVE 1 TABLET (4 MG TOTAL) IN CHEEK EVERY 12 HOURS AS NEEDED FOR NAUSEA OR VOMITING. (Patient not taking: Reported on 1/24/2025)       No current facility-administered medications on file prior to visit.      Social History     Tobacco Use    Smoking status: Former     Current packs/day: 0.00     Average packs/day: 0.5 packs/day for 10.0 years (5.0 ttl pk-yrs)     Types: Cigarettes     Start date: 5/6/2009     Quit date: 5/6/2019     " Years since quittin.7     Passive exposure: Never    Smokeless tobacco: Never   Vaping Use    Vaping status: Never Used   Substance and Sexual Activity    Alcohol use: Not Currently     Alcohol/week: 2.0 standard drinks of alcohol     Types: 2 Standard drinks or equivalent per week     Comment: socially 2 drinks a week    Drug use: Never    Sexual activity: Yes     Partners: Male     Birth control/protection: I.U.D.     Comment: paragard IUD       Objective   /80 (BP Location: Left arm, Patient Position: Sitting, Cuff Size: Large)   Pulse 85   Temp 98.4 °F (36.9 °C) (Tympanic)   Resp 16   Ht 5' 3\" (1.6 m)   Wt 95.2 kg (209 lb 12.8 oz)   SpO2 100%   BMI 37.16 kg/m²     Physical Exam  Constitutional:       General: She is not in acute distress.     Appearance: Normal appearance. She is not ill-appearing, toxic-appearing or diaphoretic.   HENT:      Head: Normocephalic and atraumatic.      Right Ear: Tympanic membrane and ear canal normal.      Left Ear: Tympanic membrane and ear canal normal.      Nose: Nose normal. No congestion.      Mouth/Throat:      Mouth: Mucous membranes are moist.      Pharynx: Oropharynx is clear. No oropharyngeal exudate.   Eyes:      Extraocular Movements: Extraocular movements intact.      Conjunctiva/sclera: Conjunctivae normal.      Pupils: Pupils are equal, round, and reactive to light.   Cardiovascular:      Rate and Rhythm: Normal rate and regular rhythm.      Pulses: no weak pulses.           Dorsalis pedis pulses are 1+ on the right side and 1+ on the left side.      Heart sounds: No murmur heard.  Pulmonary:      Effort: Pulmonary effort is normal.      Breath sounds: Normal breath sounds. No wheezing, rhonchi or rales.   Abdominal:      General: Bowel sounds are normal. There is no distension.      Palpations: Abdomen is soft.      Tenderness: There is no abdominal tenderness.   Musculoskeletal:         General: No swelling or tenderness. Normal range of motion. "      Cervical back: Normal range of motion and neck supple.   Feet:      Right foot:      Skin integrity: No ulcer, skin breakdown, erythema, warmth, callus or dry skin.      Left foot:      Skin integrity: No ulcer, skin breakdown, erythema, warmth, callus or dry skin.   Skin:     General: Skin is warm and dry.      Capillary Refill: Capillary refill takes less than 2 seconds.   Neurological:      General: No focal deficit present.      Mental Status: She is alert and oriented to person, place, and time.      Cranial Nerves: No cranial nerve deficit.   Psychiatric:         Mood and Affect: Mood normal.         Behavior: Behavior normal.         Thought Content: Thought content normal.     Patient's shoes and socks removed.    Right Foot/Ankle   Right Foot Inspection  Skin Exam: skin normal and skin intact. No dry skin, no warmth, no callus, no erythema, no maceration, no abnormal color, no pre-ulcer, no ulcer and no callus.     Toe Exam: ROM and strength within normal limits.     Sensory   Monofilament testing: intact    Vascular  Capillary refills: < 3 seconds  The right DP pulse is 1+.     Left Foot/Ankle  Left Foot Inspection  Skin Exam: skin normal and skin intact. No dry skin, no warmth, no erythema, no maceration, normal color, no pre-ulcer, no ulcer and no callus.     Toe Exam: ROM and strength within normal limits.     Sensory   Monofilament testing: intact    Vascular  Capillary refills: < 3 seconds  The left DP pulse is 1+.     Assign Risk Category  No deformity present  No loss of protective sensation  No weak pulses  Risk: 0

## 2025-01-24 NOTE — ASSESSMENT & PLAN NOTE
Lab Results   Component Value Date    HGBA1C 7.1 (H) 01/16/2025     Patient wishes to try nutrition therapy before starting medication  We dicussed needing medication if numbers can not improve with diet and nutrition changes     Orders:    Ambulatory Referral to Diabetic Education; Future    Lipid panel; Future    Comprehensive metabolic panel; Future    Hemoglobin A1C; Future

## 2025-01-24 NOTE — PATIENT INSTRUCTIONS
"Patient Education     Routine physical for adults   The Basics   Written by the doctors and editors at [facility]   What is a physical? -- A physical is a routine visit, or \"check-up,\" with your doctor. You might also hear it called a \"wellness visit\" or \"preventive visit.\"  During each visit, the doctor will:   Ask about your physical and mental health   Ask about your habits, behaviors, and lifestyle   Do an exam   Give you vaccines if needed   Talk to you about any medicines you take   Give advice about your health   Answer your questions  Getting regular check-ups is an important part of taking care of your health. It can help your doctor find and treat any problems you have. But it's also important for preventing health problems.  A routine physical is different from a \"sick visit.\" A sick visit is when you see a doctor because of a health concern or problem. Since physicals are scheduled ahead of time, you can think about what you want to ask the doctor.  How often should I get a physical? -- It depends on your age and health. In general, for people age 21 years and older:   If you are younger than 50 years, you might be able to get a physical every 3 years.   If you are 50 years or older, your doctor might recommend a physical every year.  If you have an ongoing health condition, like diabetes or high blood pressure, your doctor will probably want to see you more often.  What happens during a physical? -- In general, each visit will include:   Physical exam - The doctor or nurse will check your height, weight, heart rate, and blood pressure. They will also look at your eyes and ears. They will ask about how you are feeling and whether you have any symptoms that bother you.   Medicines - It's a good idea to bring a list of all the medicines you take to each doctor visit. Your doctor will talk to you about your medicines and answer any questions. Tell them if you are having any side effects that bother you. You " "should also tell them if you are having trouble paying for any of your medicines.   Habits and behaviors - This includes:   Your diet   Your exercise habits   Whether you smoke, drink alcohol, or use drugs   Whether you are sexually active   Whether you feel safe at home  Your doctor will talk to you about things you can do to improve your health and lower your risk of health problems. They will also offer help and support. For example, if you want to quit smoking, they can give you advice and might prescribe medicines. If you want to improve your diet or get more physical activity, they can help you with this, too.   Lab tests, if needed - The tests you get will depend on your age and situation. For example, your doctor might want to check your:   Cholesterol   Blood sugar   Iron level   Vaccines - The recommended vaccines will depend on your age, health, and what vaccines you already had. Vaccines are very important because they can prevent certain serious or deadly infections.   Discussion of screening - \"Screening\" means checking for diseases or other health problems before they cause symptoms. Your doctor can recommend screening based on your age, risk, and preferences. This might include tests to check for:   Cancer, such as breast, prostate, cervical, ovarian, colorectal, prostate, lung, or skin cancer   Sexually transmitted infections, such as chlamydia and gonorrhea   Mental health conditions like depression and anxiety  Your doctor will talk to you about the different types of screening tests. They can help you decide which screenings to have. They can also explain what the results might mean.   Answering questions - The physical is a good time to ask the doctor or nurse questions about your health. If needed, they can refer you to other doctors or specialists, too.  Adults older than 65 years often need other care, too. As you get older, your doctor will talk to you about:   How to prevent falling at " home   Hearing or vision tests   Memory testing   How to take your medicines safely   Making sure that you have the help and support you need at home  All topics are updated as new evidence becomes available and our peer review process is complete.  This topic retrieved from [employer] on: May 02, 2024.  Topic 202798 Version 1.0  Release: 32.4.3 - C32.122  © 2024 UpToDate, Inc. and/or its affiliates. All rights reserved.  Consumer Information Use and Disclaimer   Disclaimer: This generalized information is a limited summary of diagnosis, treatment, and/or medication information. It is not meant to be comprehensive and should be used as a tool to help the user understand and/or assess potential diagnostic and treatment options. It does NOT include all information about conditions, treatments, medications, side effects, or risks that may apply to a specific patient. It is not intended to be medical advice or a substitute for the medical advice, diagnosis, or treatment of a health care provider based on the health care provider's examination and assessment of a patient's specific and unique circumstances. Patients must speak with a health care provider for complete information about their health, medical questions, and treatment options, including any risks or benefits regarding use of medications. This information does not endorse any treatments or medications as safe, effective, or approved for treating a specific patient. UpToDate, Inc. and its affiliates disclaim any warranty or liability relating to this information or the use thereof.The use of this information is governed by the Terms of Use, available at https://www.woltersPopUpstersuwer.com/en/know/clinical-effectiveness-terms. 2024© UpToDate, Inc. and its affiliates and/or licensors. All rights reserved.  Copyright   © 2024 UpToDate, Inc. and/or its affiliates. All rights reserved.

## 2025-01-26 ENCOUNTER — RESULTS FOLLOW-UP (OUTPATIENT)
Dept: FAMILY MEDICINE CLINIC | Facility: CLINIC | Age: 35
End: 2025-01-26

## 2025-02-19 ENCOUNTER — APPOINTMENT (OUTPATIENT)
Dept: LAB | Facility: CLINIC | Age: 35
End: 2025-02-19

## 2025-02-19 DIAGNOSIS — Z00.6 ENCOUNTER FOR EXAMINATION FOR NORMAL COMPARISON OR CONTROL IN CLINICAL RESEARCH PROGRAM: ICD-10-CM

## 2025-02-19 PROCEDURE — 36415 COLL VENOUS BLD VENIPUNCTURE: CPT

## 2025-03-02 LAB
APOB+LDLR+PCSK9 GENE MUT ANL BLD/T: NOT DETECTED
BRCA1+BRCA2 DEL+DUP + FULL MUT ANL BLD/T: NOT DETECTED
MLH1+MSH2+MSH6+PMS2 GN DEL+DUP+FUL M: NOT DETECTED

## 2025-03-26 ENCOUNTER — OFFICE VISIT (OUTPATIENT)
Dept: DIABETES SERVICES | Facility: CLINIC | Age: 35
End: 2025-03-26
Payer: COMMERCIAL

## 2025-03-26 VITALS — WEIGHT: 206.2 LBS | BODY MASS INDEX: 36.53 KG/M2

## 2025-03-26 DIAGNOSIS — E11.65 TYPE 2 DIABETES MELLITUS WITH HYPERGLYCEMIA, WITHOUT LONG-TERM CURRENT USE OF INSULIN (HCC): Primary | ICD-10-CM

## 2025-03-26 PROCEDURE — 97802 MEDICAL NUTRITION INDIV IN: CPT | Performed by: DIETITIAN, REGISTERED

## 2025-03-26 NOTE — PROGRESS NOTES
"Medical Nutrition Therapy        Assessment    Visit Type: Initial visit  Chief complaint/Medical Diagnosis/reason for visit Type 2 diabetes mellitus with hyperglycemia, without long-term current use of insulin (HCC)     REJI Person was seen today for an initial MNT visit. Most recent A1C was 7.1. Naya reports being diagnosed with T2DM about 2 years ago. She has been managing her BG through diet. Patient reports h/o gestational diabetes. Problems identified in food recall include inconsistent carbohydrate intake, meal skipping, excess calories coming from high fat food choices, low intake of plant based foods and general lack of balance.  Provided patient with a 1400 calorie meal plan to assist with consistency, balance and portion control.  Encouraged the consumption of regular meals at regular times 4-5 hours apart--no skipping.  Advised patient to keep carbohydrate intake to 30-45 grams per meal and 15 grams per HS snack to assist with glycemic control.  Suggested keeping protein intake to 6 ounces a day and fat to 4 servings daily to assist with lipid management and calorie control. Naya would benefit from initiating regular aerobic exercise. Portion booklet and food labels were used to teach basic carbohydrate counting. Patient agreed to keep daily food logs. Follow-up in 6 weeks. RD will remain available for further dietary questions/concerns.     Ht Readings from Last 1 Encounters:   01/24/25 5' 3\" (1.6 m)     Wt Readings from Last 3 Encounters:   03/26/25 93.5 kg (206 lb 3.2 oz)   01/24/25 95.2 kg (209 lb 12.8 oz)   10/23/24 95.3 kg (210 lb)     Weight Change: Yes planned 10 pound weight loss in the past 2 months    Barriers to Learning: no barriers    Do you follow any special diet presently?: No  Who shops: patient and spouse  Who cooks: patient and spouse    Food Log: Completed via the method of food recall    Breakfast:7:00-7:30AM SKIPS 1-2 times a week; 1-2 protein waffles with 1 Tbsp PB OR 2 HB " egg with Greek yogurt or as a sandwich on 2 slices of bread with cheese, water (prior to the past month was getting [employer] McGriddle and hash browns and large sweet tea)  Morning Snack:10:00AM twice a week: Greek yogurt sometimes with granola or 2 HC eggs; water  Lunch:12:00PM at work: 1/4 hoagie with chips or pasta and bread OR from home: meatballs and asparagus or sausage and zucchini,water  Afternoon Snack: 2:00-3:00PM twice a week: 28 almonds  Dinner:5:30-6:00PM past month a protein and a veggie OR 1.5 cups pasta with butter OR pizza OR burgers or hotdogs on buns, chips, water  Evening Snack:7:30-8:00PM 2-3 times a week: non-portioned potato chips or was having ice cream sandwich  Beverages: water, occasional diet Coke, hot tea with Splenda  Eating out/Take out:1-2 times a week  Exercise nothing beyond daily activities     Calorie needs 1400kcals/day Carbs: 30-45g/meal, 15g/snack     Fat: 4 servings/day    Protein:6 ounces/day    Nutrition Diagnosis:  Food and nutrition related knowledge deficit  related to Lack of prior exposure to accurate nutrition related information as evidenced by No prior knowledge of need for food and nutrition related recommendations    Intervention: plate method, reduced fat intake, label reading, behavior modification strategies, carbohydrate counting, increased plant based foods, meal timing, meal planning, individualized meal plan, exercise guidelines, and food diary     Treatment Goals: Patient will monitor fat intake, Patient will consume 3 meals a day, Patient will increase their intake of plant based foods, Patient will count carbohydrates, Patient will exercise, and Protein intake.    Monitoring and evaluation:    Term code indicator  FH 1.3.2 Food Intake Criteria: Consume 3 meals a day 4-5 hours apart--NO SKIPPING.  Term code indicator  FH 1.6.3 Carbohydrate Intake Criteria: 45 grams of carbohydrate per meal.  Term code indicator  FH 1.6.2 Protein Intake Criteria: 6 ounces  lean protein a day.  Term code indicator  FH 1.6.1 Fat and Cholesterol Intake Criteria: 4 added fats a day.  Term code indicator  FH 1.6.4 Fiber Intake Criteria: Increase intake of plant based foods.   Term code indicator  CH 2.2 Treatments/Therapy/Alternative Medicine Criteria: Initiate regular aerobic exercise.      Materials Provided: Portion booklet and food logs    Patient’s Response to Instruction:  Comprehensiongood  Motivationgood  Expected Compliancegood    Start- Stop: 9:15-10:00  Total Minutes: 45 Minutes  Group or Individual Instruction: MNT-I  Other: Dr. Smalls    Thank you for coming to the [facility] for education today.  Please feel free to call with any questions or concerns.    Familia Mcgrath  [address] [phone]

## 2025-03-26 NOTE — PATIENT INSTRUCTIONS
Consume 3 meals a day 4-5 hours apart--NO SKIPPING.  45 grams of carbohydrate per meal.  6 ounces lean protein a day.  4 added fats a day.  Increase intake of plant based foods.   Initiate regular aerobic exercise.

## 2025-05-16 LAB
ALBUMIN SERPL-MCNC: 4.2 G/DL (ref 3.9–4.9)
ALP SERPL-CCNC: 121 IU/L (ref 44–121)
ALT SERPL-CCNC: 11 IU/L (ref 0–32)
AST SERPL-CCNC: 14 IU/L (ref 0–40)
BILIRUB SERPL-MCNC: 0.3 MG/DL (ref 0–1.2)
BUN SERPL-MCNC: 7 MG/DL (ref 6–20)
BUN/CREAT SERPL: 10 (ref 9–23)
CALCIUM SERPL-MCNC: 9.4 MG/DL (ref 8.7–10.2)
CHLORIDE SERPL-SCNC: 103 MMOL/L (ref 96–106)
CHOLEST SERPL-MCNC: 151 MG/DL (ref 100–199)
CHOLEST/HDLC SERPL: 3.7 RATIO (ref 0–4.4)
CO2 SERPL-SCNC: 20 MMOL/L (ref 20–29)
CREAT SERPL-MCNC: 0.67 MG/DL (ref 0.57–1)
EGFR: 116 ML/MIN/1.73
EST. AVERAGE GLUCOSE BLD GHB EST-MCNC: 154 MG/DL
GLOBULIN SER-MCNC: 2.6 G/DL (ref 1.5–4.5)
GLUCOSE SERPL-MCNC: 140 MG/DL (ref 70–99)
HBA1C MFR BLD: 7 % (ref 4.8–5.6)
HDLC SERPL-MCNC: 41 MG/DL
LDLC SERPL CALC-MCNC: 87 MG/DL (ref 0–99)
POTASSIUM SERPL-SCNC: 4.2 MMOL/L (ref 3.5–5.2)
PROT SERPL-MCNC: 6.8 G/DL (ref 6–8.5)
SL AMB VLDL CHOLESTEROL CALC: 23 MG/DL (ref 5–40)
SODIUM SERPL-SCNC: 138 MMOL/L (ref 134–144)
TRIGL SERPL-MCNC: 130 MG/DL (ref 0–149)

## 2025-05-29 ENCOUNTER — OFFICE VISIT (OUTPATIENT)
Dept: FAMILY MEDICINE CLINIC | Facility: CLINIC | Age: 35
End: 2025-05-29
Payer: COMMERCIAL

## 2025-05-29 VITALS
DIASTOLIC BLOOD PRESSURE: 84 MMHG | SYSTOLIC BLOOD PRESSURE: 116 MMHG | HEART RATE: 88 BPM | BODY MASS INDEX: 37.14 KG/M2 | WEIGHT: 209.6 LBS | OXYGEN SATURATION: 99 % | TEMPERATURE: 97.7 F | RESPIRATION RATE: 16 BRPM | HEIGHT: 63 IN

## 2025-05-29 DIAGNOSIS — E11.65 TYPE 2 DIABETES MELLITUS WITH HYPERGLYCEMIA, WITHOUT LONG-TERM CURRENT USE OF INSULIN (HCC): Primary | ICD-10-CM

## 2025-05-29 PROCEDURE — 99214 OFFICE O/P EST MOD 30 MIN: CPT | Performed by: FAMILY MEDICINE

## 2025-05-29 NOTE — PROGRESS NOTES
Naya Baxter 1990 female MRN: [id number]    Family Medicine Follow-up Visit    ASSESSMENT/PLAN  Problem List Items Addressed This Visit          Endocrine    Type 2 diabetes mellitus with hyperglycemia, without long-term current use of insulin (HCC) - Primary      Lab Results   Component Value Date    HGBA1C 7.0 (H) 05/15/2025       Ha1c remains stable but still not at goal  She has worked with nutrition and is still having trouble getting sugars down  She is ready for medication at this time  We discussed pharmacy consult if needed  Will start metformin  Get labs done in 3 months prior to next visit          Relevant Medications    metFORMIN (GLUCOPHAGE) 500 mg tablet    Other Relevant Orders    Comprehensive metabolic panel    Hemoglobin A1C       Follow up in 3-4 months with labs prior for diabetes check up           Future Appointments   Date Time Provider Department Center   8/20/2025 11:20 AM Nagi Salguero MD [facility]   10/29/2025  9:30 AM Karthik Arrieta MD [facility]          SUBJECTIVE  CC: Follow-up (4 month check )      HPI:  Naya Baxter is a 35 y.o. female who presents for check up    HPI    Review of Systems   Constitutional:  Negative for chills, fatigue and fever.   HENT:  Negative for congestion, postnasal drip, rhinorrhea and sinus pressure.    Eyes:  Negative for photophobia and visual disturbance.   Respiratory:  Negative for cough and shortness of breath.    Cardiovascular:  Negative for chest pain, palpitations and leg swelling.   Gastrointestinal:  Negative for abdominal pain, constipation, diarrhea, nausea and vomiting.   Genitourinary:  Negative for difficulty urinating and dysuria.   Musculoskeletal:  Negative for arthralgias and myalgias.   Skin:  Negative for color change and rash.   Neurological:  Negative for dizziness, weakness, light-headedness and headaches.       Historical Information   The patient history was reviewed as  "follows:    Past Medical History[1]  Past Surgical History[2]  Family History[3]   Social History   Social History     Substance and Sexual Activity   Alcohol Use Not Currently    Alcohol/week: 2.0 standard drinks of alcohol    Types: 2 Standard drinks or equivalent per week    Comment: socially 2 drinks a week     Social History     Substance and Sexual Activity   Drug Use Never     Tobacco Use History[4]    Medications:   Current Medications[5]  Allergies[6]    OBJECTIVE    Vitals:   Vitals:    25 0731   BP: 116/84   BP Location: Left arm   Patient Position: Sitting   Cuff Size: Large   Pulse: 88   Resp: 16   Temp: 97.7 °F (36.5 °C)   TempSrc: Tympanic   SpO2: 99%   Weight: 95.1 kg (209 lb 9.6 oz)   Height: 5' 3\" (1.6 m)           Physical Exam  Constitutional:       Appearance: She is well-developed.   HENT:      Head: Normocephalic and atraumatic.     Cardiovascular:      Rate and Rhythm: Normal rate and regular rhythm.      Heart sounds: Normal heart sounds.   Pulmonary:      Effort: Pulmonary effort is normal. No respiratory distress.      Breath sounds: Normal breath sounds. No wheezing.   Abdominal:      General: Bowel sounds are normal.     Musculoskeletal:         General: No tenderness. Normal range of motion.      Cervical back: Normal range of motion and neck supple.     Skin:     General: Skin is warm and dry.     Neurological:      Mental Status: She is alert and oriented to person, place, and time.     Psychiatric:         Behavior: Behavior normal.            Labs:        Alis Smalls DO    2025           [1]   Past Medical History:  Diagnosis Date    Chronic fatigue     Depression     currently on Zoloft 50 mg managed by PCP.    [2]   Past Surgical History:  Procedure Laterality Date    SC  DELIVERY ONLY N/A 10/25/2022    Procedure:  SECTION ();  Surgeon: Karthik Arrieta MD;  Location: [facility];  Service: Obstetrics    TONSILECTOMY AND ADNOIDECTOMY      " WISDOM TOOTH EXTRACTION     [3]   Family History  Problem Relation Name Age of Onset    Thyroid disease Mother      Diabetes type II Father      Diabetes type II Maternal Grandmother      Lung cancer Maternal Grandfather      Diabetes type II Maternal Grandfather      Breast cancer Neg Hx      Ovarian cancer Neg Hx      Colon cancer Neg Hx      Down syndrome Neg Hx      Fragile X syndrome Neg Hx      Cystic fibrosis Neg Hx      Uterine cancer Neg Hx     [4]   Social History  Tobacco Use   Smoking Status Former    Current packs/day: 0.00    Average packs/day: 0.5 packs/day for 10.0 years (5.0 ttl pk-yrs)    Types: Cigarettes    Start date: 2009    Quit date: 2019    Years since quittin.0    Passive exposure: Never   Smokeless Tobacco Never   [5]   Current Outpatient Medications:     intrauterine copper (PARAGARD) IUD, 1 each by Intrauterine route once, Disp: , Rfl:     metFORMIN (GLUCOPHAGE) 500 mg tablet, Take 1 tablet (500 mg total) by mouth 2 (two) times a day with meals, Disp: 200 tablet, Rfl: 3    Multiple Vitamin (multivitamin) tablet, Take 1 tablet by mouth in the morning. With omega 3., Disp: , Rfl:     magnesium (MAGTAB) 84 MG (7MEQ) TBCR, Take 84 mg by mouth in the morning. 250 mg. (Patient not taking: Reported on 2025), Disp: , Rfl:     ondansetron (ZOFRAN-ODT) 4 mg disintegrating tablet, DISSOLVE 1 TABLET (4 MG TOTAL) IN CHEEK EVERY 12 HOURS AS NEEDED FOR NAUSEA OR VOMITING. (Patient not taking: Reported on 2025), Disp: , Rfl:   [6] No Known Allergies

## 2025-05-29 NOTE — ASSESSMENT & PLAN NOTE
Lab Results   Component Value Date    HGBA1C 7.0 (H) 05/15/2025       Ha1c remains stable but still not at goal  She has worked with nutrition and is still having trouble getting sugars down  She is ready for medication at this time  We discussed pharmacy consult if needed  Will start metformin  Get labs done in 3 months prior to next visit

## (undated) DEVICE — PACK C-SECTION PBDS

## (undated) DEVICE — TELFA NON-ADHERENT ABSORBENT DRESSING: Brand: TELFA

## (undated) DEVICE — 3M™ STERI-STRIP™ COMPOUND BENZOIN TINCTURE 40 BAGS/CARTON 4 CARTONS/CASE C1544: Brand: 3M™ STERI-STRIP™

## (undated) DEVICE — GLOVE PI ULTRA TOUCH SZ.7.0

## (undated) DEVICE — SUT VICRYL 0 CTX 36 IN J978H

## (undated) DEVICE — CHLORAPREP HI-LITE 26ML ORANGE

## (undated) DEVICE — SKIN MARKER DUAL TIP WITH RULER CAP, FLEXIBLE RULER AND LABELS: Brand: DEVON

## (undated) DEVICE — Device

## (undated) DEVICE — SUT VICRYL 4-0 PS-2 18 IN J496G

## (undated) DEVICE — 3M™ STERI-STRIP™ REINFORCED ADHESIVE SKIN CLOSURES, R1547, 1/2 IN X 4 IN (12 MM X 100 MM), 6 STRIPS/ENVELOPE: Brand: 3M™ STERI-STRIP™

## (undated) DEVICE — GLOVE INDICATOR PI UNDERGLOVE SZ 7.5 BLUE

## (undated) DEVICE — ABDOMINAL PAD: Brand: DERMACEA

## (undated) DEVICE — BAG DECANTER